# Patient Record
Sex: MALE | Race: WHITE | Employment: OTHER | URBAN - NONMETROPOLITAN AREA
[De-identification: names, ages, dates, MRNs, and addresses within clinical notes are randomized per-mention and may not be internally consistent; named-entity substitution may affect disease eponyms.]

---

## 2017-03-13 ENCOUNTER — TELEPHONE (OUTPATIENT)
Dept: FAMILY MEDICINE CLINIC | Age: 77
End: 2017-03-13

## 2017-03-20 ENCOUNTER — OFFICE VISIT (OUTPATIENT)
Dept: FAMILY MEDICINE CLINIC | Age: 77
End: 2017-03-20

## 2017-03-20 VITALS
WEIGHT: 198 LBS | HEART RATE: 80 BPM | DIASTOLIC BLOOD PRESSURE: 72 MMHG | HEIGHT: 73 IN | SYSTOLIC BLOOD PRESSURE: 126 MMHG | BODY MASS INDEX: 26.24 KG/M2

## 2017-03-20 DIAGNOSIS — I50.9 CONGESTIVE HEART FAILURE, UNSPECIFIED CONGESTIVE HEART FAILURE CHRONICITY, UNSPECIFIED CONGESTIVE HEART FAILURE TYPE: Primary | ICD-10-CM

## 2017-03-20 DIAGNOSIS — I25.10 ASHD (ARTERIOSCLEROTIC HEART DISEASE): ICD-10-CM

## 2017-03-20 DIAGNOSIS — K90.41 GLUTEN ENTEROPATHY: ICD-10-CM

## 2017-03-20 PROCEDURE — 99213 OFFICE O/P EST LOW 20 MIN: CPT | Performed by: FAMILY MEDICINE

## 2017-03-20 PROCEDURE — 4040F PNEUMOC VAC/ADMIN/RCVD: CPT | Performed by: FAMILY MEDICINE

## 2017-03-20 PROCEDURE — G8598 ASA/ANTIPLAT THER USED: HCPCS | Performed by: FAMILY MEDICINE

## 2017-03-20 PROCEDURE — 1123F ACP DISCUSS/DSCN MKR DOCD: CPT | Performed by: FAMILY MEDICINE

## 2017-03-20 PROCEDURE — G8427 DOCREV CUR MEDS BY ELIG CLIN: HCPCS | Performed by: FAMILY MEDICINE

## 2017-03-20 PROCEDURE — G8420 CALC BMI NORM PARAMETERS: HCPCS | Performed by: FAMILY MEDICINE

## 2017-03-20 PROCEDURE — 1036F TOBACCO NON-USER: CPT | Performed by: FAMILY MEDICINE

## 2017-03-20 PROCEDURE — G8484 FLU IMMUNIZE NO ADMIN: HCPCS | Performed by: FAMILY MEDICINE

## 2017-03-20 RX ORDER — ASPIRIN 81 MG/1
81 TABLET ORAL DAILY PRN
COMMUNITY
End: 2019-09-16

## 2017-03-20 RX ORDER — TERAZOSIN 5 MG/1
5 CAPSULE ORAL NIGHTLY
Qty: 90 CAPSULE | Refills: 1 | Status: SHIPPED | OUTPATIENT
Start: 2017-03-20 | End: 2017-09-18 | Stop reason: SDUPTHER

## 2017-03-20 RX ORDER — HYDROCHLOROTHIAZIDE 25 MG/1
25 TABLET ORAL DAILY
Qty: 90 TABLET | Refills: 1 | Status: SHIPPED | OUTPATIENT
Start: 2017-03-20 | End: 2017-09-18 | Stop reason: SDUPTHER

## 2017-03-20 RX ORDER — FLUTICASONE PROPIONATE 50 MCG
SPRAY, SUSPENSION (ML) NASAL
Qty: 3 BOTTLE | Refills: 1 | Status: SHIPPED | OUTPATIENT
Start: 2017-03-20 | End: 2017-09-18

## 2017-03-20 RX ORDER — BACLOFEN 10 MG/1
10 TABLET ORAL 2 TIMES DAILY
Qty: 180 TABLET | Refills: 1 | Status: SHIPPED | OUTPATIENT
Start: 2017-03-20 | End: 2017-09-18 | Stop reason: SDUPTHER

## 2017-03-20 RX ORDER — NIZATIDINE 150 MG/1
150 CAPSULE ORAL 2 TIMES DAILY
Qty: 180 CAPSULE | Refills: 1 | Status: SHIPPED | OUTPATIENT
Start: 2017-03-20 | End: 2017-09-18 | Stop reason: SDUPTHER

## 2017-04-26 ENCOUNTER — OFFICE VISIT (OUTPATIENT)
Dept: FAMILY MEDICINE CLINIC | Age: 77
End: 2017-04-26

## 2017-04-26 VITALS
WEIGHT: 194.8 LBS | SYSTOLIC BLOOD PRESSURE: 128 MMHG | DIASTOLIC BLOOD PRESSURE: 62 MMHG | HEART RATE: 76 BPM | BODY MASS INDEX: 25.82 KG/M2 | HEIGHT: 73 IN

## 2017-04-26 DIAGNOSIS — R52 PAIN: Primary | ICD-10-CM

## 2017-04-26 PROCEDURE — 1036F TOBACCO NON-USER: CPT | Performed by: FAMILY MEDICINE

## 2017-04-26 PROCEDURE — G8427 DOCREV CUR MEDS BY ELIG CLIN: HCPCS | Performed by: FAMILY MEDICINE

## 2017-04-26 PROCEDURE — 99212 OFFICE O/P EST SF 10 MIN: CPT | Performed by: FAMILY MEDICINE

## 2017-04-26 PROCEDURE — 4040F PNEUMOC VAC/ADMIN/RCVD: CPT | Performed by: FAMILY MEDICINE

## 2017-04-26 PROCEDURE — G8420 CALC BMI NORM PARAMETERS: HCPCS | Performed by: FAMILY MEDICINE

## 2017-04-26 PROCEDURE — G8598 ASA/ANTIPLAT THER USED: HCPCS | Performed by: FAMILY MEDICINE

## 2017-04-26 PROCEDURE — 1123F ACP DISCUSS/DSCN MKR DOCD: CPT | Performed by: FAMILY MEDICINE

## 2017-04-26 RX ORDER — TRAMADOL HYDROCHLORIDE 50 MG/1
50 TABLET ORAL 4 TIMES DAILY PRN
Qty: 20 TABLET | Refills: 0 | Status: SHIPPED | OUTPATIENT
Start: 2017-04-26 | End: 2017-06-05

## 2017-05-01 DIAGNOSIS — I25.10 ASHD (ARTERIOSCLEROTIC HEART DISEASE): ICD-10-CM

## 2017-05-01 RX ORDER — ROSUVASTATIN CALCIUM 10 MG/1
10 TABLET, COATED ORAL WEEKLY
Qty: 1 TABLET | Refills: 0 | Status: SHIPPED | OUTPATIENT
Start: 2017-05-01 | End: 2017-09-18 | Stop reason: CLARIF

## 2017-05-19 ENCOUNTER — OFFICE VISIT (OUTPATIENT)
Dept: FAMILY MEDICINE CLINIC | Age: 77
End: 2017-05-19

## 2017-05-19 VITALS
TEMPERATURE: 97.8 F | HEIGHT: 73 IN | SYSTOLIC BLOOD PRESSURE: 116 MMHG | BODY MASS INDEX: 25.39 KG/M2 | DIASTOLIC BLOOD PRESSURE: 78 MMHG | WEIGHT: 191.6 LBS | HEART RATE: 80 BPM

## 2017-05-19 DIAGNOSIS — J01.00 SUBACUTE MAXILLARY SINUSITIS: Primary | ICD-10-CM

## 2017-05-19 PROCEDURE — G8420 CALC BMI NORM PARAMETERS: HCPCS | Performed by: FAMILY MEDICINE

## 2017-05-19 PROCEDURE — 1123F ACP DISCUSS/DSCN MKR DOCD: CPT | Performed by: FAMILY MEDICINE

## 2017-05-19 PROCEDURE — 4040F PNEUMOC VAC/ADMIN/RCVD: CPT | Performed by: FAMILY MEDICINE

## 2017-05-19 PROCEDURE — 99212 OFFICE O/P EST SF 10 MIN: CPT | Performed by: FAMILY MEDICINE

## 2017-05-19 PROCEDURE — G8427 DOCREV CUR MEDS BY ELIG CLIN: HCPCS | Performed by: FAMILY MEDICINE

## 2017-05-19 PROCEDURE — 1036F TOBACCO NON-USER: CPT | Performed by: FAMILY MEDICINE

## 2017-05-19 PROCEDURE — G8598 ASA/ANTIPLAT THER USED: HCPCS | Performed by: FAMILY MEDICINE

## 2017-05-19 RX ORDER — DOXYCYCLINE HYCLATE 100 MG/1
100 CAPSULE ORAL 2 TIMES DAILY
Qty: 20 CAPSULE | Refills: 0 | Status: SHIPPED | OUTPATIENT
Start: 2017-05-19 | End: 2017-07-31 | Stop reason: SDUPTHER

## 2017-06-09 PROBLEM — I44.2 COMPLETE HEART BLOCK (HCC): Status: ACTIVE | Noted: 2017-06-09

## 2017-06-09 PROBLEM — Z45.010 PACEMAKER BATTERY DEPLETION: Status: ACTIVE | Noted: 2017-06-09

## 2017-06-26 DIAGNOSIS — I25.10 ASHD (ARTERIOSCLEROTIC HEART DISEASE): ICD-10-CM

## 2017-06-26 RX ORDER — ROSUVASTATIN CALCIUM 5 MG/1
5 TABLET, COATED ORAL NIGHTLY
Qty: 90 TABLET | Refills: 0 | Status: SHIPPED | OUTPATIENT
Start: 2017-06-26 | End: 2017-09-18 | Stop reason: SDUPTHER

## 2017-07-25 ENCOUNTER — OFFICE VISIT (OUTPATIENT)
Dept: FAMILY MEDICINE CLINIC | Age: 77
End: 2017-07-25
Payer: COMMERCIAL

## 2017-07-25 VITALS
SYSTOLIC BLOOD PRESSURE: 138 MMHG | DIASTOLIC BLOOD PRESSURE: 80 MMHG | HEART RATE: 60 BPM | HEIGHT: 73 IN | BODY MASS INDEX: 25.69 KG/M2 | WEIGHT: 193.8 LBS | TEMPERATURE: 98.3 F

## 2017-07-25 DIAGNOSIS — J02.8 ACUTE PHARYNGITIS DUE TO OTHER SPECIFIED ORGANISMS: Primary | ICD-10-CM

## 2017-07-25 PROCEDURE — 1036F TOBACCO NON-USER: CPT | Performed by: FAMILY MEDICINE

## 2017-07-25 PROCEDURE — G8598 ASA/ANTIPLAT THER USED: HCPCS | Performed by: FAMILY MEDICINE

## 2017-07-25 PROCEDURE — G8427 DOCREV CUR MEDS BY ELIG CLIN: HCPCS | Performed by: FAMILY MEDICINE

## 2017-07-25 PROCEDURE — 1123F ACP DISCUSS/DSCN MKR DOCD: CPT | Performed by: FAMILY MEDICINE

## 2017-07-25 PROCEDURE — G8419 CALC BMI OUT NRM PARAM NOF/U: HCPCS | Performed by: FAMILY MEDICINE

## 2017-07-25 PROCEDURE — 4040F PNEUMOC VAC/ADMIN/RCVD: CPT | Performed by: FAMILY MEDICINE

## 2017-07-25 PROCEDURE — 99213 OFFICE O/P EST LOW 20 MIN: CPT | Performed by: FAMILY MEDICINE

## 2017-07-25 RX ORDER — AMOXICILLIN 500 MG/1
1 TABLET, FILM COATED ORAL 2 TIMES DAILY
Qty: 20 TABLET | Refills: 0 | Status: SHIPPED | OUTPATIENT
Start: 2017-07-25 | End: 2017-07-31 | Stop reason: ALTCHOICE

## 2017-07-25 ASSESSMENT — ENCOUNTER SYMPTOMS
SORE THROAT: 1
COUGH: 0
NAUSEA: 0

## 2017-07-31 ENCOUNTER — OFFICE VISIT (OUTPATIENT)
Dept: FAMILY MEDICINE CLINIC | Age: 77
End: 2017-07-31
Payer: COMMERCIAL

## 2017-07-31 VITALS
TEMPERATURE: 98.2 F | DIASTOLIC BLOOD PRESSURE: 70 MMHG | BODY MASS INDEX: 25.73 KG/M2 | HEART RATE: 74 BPM | SYSTOLIC BLOOD PRESSURE: 128 MMHG | WEIGHT: 195 LBS

## 2017-07-31 DIAGNOSIS — J01.10 ACUTE NON-RECURRENT FRONTAL SINUSITIS: Primary | ICD-10-CM

## 2017-07-31 PROCEDURE — 1123F ACP DISCUSS/DSCN MKR DOCD: CPT | Performed by: FAMILY MEDICINE

## 2017-07-31 PROCEDURE — G8419 CALC BMI OUT NRM PARAM NOF/U: HCPCS | Performed by: FAMILY MEDICINE

## 2017-07-31 PROCEDURE — G8427 DOCREV CUR MEDS BY ELIG CLIN: HCPCS | Performed by: FAMILY MEDICINE

## 2017-07-31 PROCEDURE — 1036F TOBACCO NON-USER: CPT | Performed by: FAMILY MEDICINE

## 2017-07-31 PROCEDURE — 99213 OFFICE O/P EST LOW 20 MIN: CPT | Performed by: FAMILY MEDICINE

## 2017-07-31 PROCEDURE — G8598 ASA/ANTIPLAT THER USED: HCPCS | Performed by: FAMILY MEDICINE

## 2017-07-31 PROCEDURE — 4040F PNEUMOC VAC/ADMIN/RCVD: CPT | Performed by: FAMILY MEDICINE

## 2017-07-31 RX ORDER — DOXYCYCLINE HYCLATE 100 MG/1
100 CAPSULE ORAL 2 TIMES DAILY
Qty: 20 CAPSULE | Refills: 0 | Status: SHIPPED | OUTPATIENT
Start: 2017-07-31 | End: 2017-08-10

## 2017-07-31 ASSESSMENT — ENCOUNTER SYMPTOMS
SINUS PRESSURE: 1
HOARSE VOICE: 0
COUGH: 0
SORE THROAT: 0
SHORTNESS OF BREATH: 0

## 2017-08-24 ENCOUNTER — OFFICE VISIT (OUTPATIENT)
Dept: FAMILY MEDICINE CLINIC | Age: 77
End: 2017-08-24
Payer: COMMERCIAL

## 2017-08-24 VITALS
HEIGHT: 73 IN | BODY MASS INDEX: 25.58 KG/M2 | WEIGHT: 193 LBS | DIASTOLIC BLOOD PRESSURE: 80 MMHG | SYSTOLIC BLOOD PRESSURE: 132 MMHG | HEART RATE: 74 BPM

## 2017-08-24 DIAGNOSIS — M25.511 ACUTE PAIN OF BOTH SHOULDERS: Primary | ICD-10-CM

## 2017-08-24 DIAGNOSIS — M77.8 TENDONITIS OF SHOULDER, RIGHT: ICD-10-CM

## 2017-08-24 DIAGNOSIS — M25.512 ACUTE PAIN OF BOTH SHOULDERS: Primary | ICD-10-CM

## 2017-08-24 PROCEDURE — 1123F ACP DISCUSS/DSCN MKR DOCD: CPT | Performed by: FAMILY MEDICINE

## 2017-08-24 PROCEDURE — G8419 CALC BMI OUT NRM PARAM NOF/U: HCPCS | Performed by: FAMILY MEDICINE

## 2017-08-24 PROCEDURE — G8598 ASA/ANTIPLAT THER USED: HCPCS | Performed by: FAMILY MEDICINE

## 2017-08-24 PROCEDURE — 1036F TOBACCO NON-USER: CPT | Performed by: FAMILY MEDICINE

## 2017-08-24 PROCEDURE — G8427 DOCREV CUR MEDS BY ELIG CLIN: HCPCS | Performed by: FAMILY MEDICINE

## 2017-08-24 PROCEDURE — 4040F PNEUMOC VAC/ADMIN/RCVD: CPT | Performed by: FAMILY MEDICINE

## 2017-08-24 PROCEDURE — 99213 OFFICE O/P EST LOW 20 MIN: CPT | Performed by: FAMILY MEDICINE

## 2017-09-11 ENCOUNTER — TELEPHONE (OUTPATIENT)
Dept: FAMILY MEDICINE CLINIC | Age: 77
End: 2017-09-11

## 2017-09-11 DIAGNOSIS — I25.10 ASHD (ARTERIOSCLEROTIC HEART DISEASE): Primary | ICD-10-CM

## 2017-09-18 ENCOUNTER — OFFICE VISIT (OUTPATIENT)
Dept: FAMILY MEDICINE CLINIC | Age: 77
End: 2017-09-18
Payer: COMMERCIAL

## 2017-09-18 VITALS
HEIGHT: 73 IN | DIASTOLIC BLOOD PRESSURE: 82 MMHG | WEIGHT: 192.6 LBS | BODY MASS INDEX: 25.53 KG/M2 | SYSTOLIC BLOOD PRESSURE: 130 MMHG | HEART RATE: 62 BPM

## 2017-09-18 DIAGNOSIS — I10 ESSENTIAL HYPERTENSION: ICD-10-CM

## 2017-09-18 DIAGNOSIS — N40.0 BENIGN NON-NODULAR PROSTATIC HYPERPLASIA WITHOUT LOWER URINARY TRACT SYMPTOMS: ICD-10-CM

## 2017-09-18 DIAGNOSIS — I25.10 ASHD (ARTERIOSCLEROTIC HEART DISEASE): Primary | ICD-10-CM

## 2017-09-18 DIAGNOSIS — M62.838 MUSCLE SPASM: ICD-10-CM

## 2017-09-18 PROCEDURE — 4040F PNEUMOC VAC/ADMIN/RCVD: CPT | Performed by: FAMILY MEDICINE

## 2017-09-18 PROCEDURE — G8417 CALC BMI ABV UP PARAM F/U: HCPCS | Performed by: FAMILY MEDICINE

## 2017-09-18 PROCEDURE — 1123F ACP DISCUSS/DSCN MKR DOCD: CPT | Performed by: FAMILY MEDICINE

## 2017-09-18 PROCEDURE — 1036F TOBACCO NON-USER: CPT | Performed by: FAMILY MEDICINE

## 2017-09-18 PROCEDURE — G8598 ASA/ANTIPLAT THER USED: HCPCS | Performed by: FAMILY MEDICINE

## 2017-09-18 PROCEDURE — G8427 DOCREV CUR MEDS BY ELIG CLIN: HCPCS | Performed by: FAMILY MEDICINE

## 2017-09-18 PROCEDURE — 99214 OFFICE O/P EST MOD 30 MIN: CPT | Performed by: FAMILY MEDICINE

## 2017-09-18 RX ORDER — TERAZOSIN 5 MG/1
5 CAPSULE ORAL NIGHTLY
Qty: 90 CAPSULE | Refills: 1 | Status: SHIPPED | OUTPATIENT
Start: 2017-09-18 | End: 2018-03-28 | Stop reason: SDUPTHER

## 2017-09-18 RX ORDER — NIZATIDINE 150 MG/1
150 CAPSULE ORAL 2 TIMES DAILY
Qty: 180 CAPSULE | Refills: 1 | Status: SHIPPED | OUTPATIENT
Start: 2017-09-18 | End: 2018-03-28 | Stop reason: SDUPTHER

## 2017-09-18 RX ORDER — HYDROCHLOROTHIAZIDE 25 MG/1
25 TABLET ORAL DAILY
Qty: 90 TABLET | Refills: 1 | Status: SHIPPED | OUTPATIENT
Start: 2017-09-18 | End: 2018-03-28 | Stop reason: SDUPTHER

## 2017-09-18 RX ORDER — ROSUVASTATIN CALCIUM 5 MG/1
5 TABLET, COATED ORAL NIGHTLY
Qty: 90 TABLET | Refills: 1 | Status: SHIPPED | OUTPATIENT
Start: 2017-09-18 | End: 2018-03-28 | Stop reason: SDUPTHER

## 2017-09-18 RX ORDER — BACLOFEN 10 MG/1
10 TABLET ORAL 2 TIMES DAILY PRN
Qty: 180 TABLET | Refills: 0 | Status: SHIPPED | OUTPATIENT
Start: 2017-09-18 | End: 2018-03-28 | Stop reason: SDUPTHER

## 2017-09-18 ASSESSMENT — ENCOUNTER SYMPTOMS
WHEEZING: 0
SHORTNESS OF BREATH: 0

## 2017-09-19 ENCOUNTER — TELEPHONE (OUTPATIENT)
Dept: FAMILY MEDICINE CLINIC | Age: 77
End: 2017-09-19

## 2017-09-19 ENCOUNTER — NURSE ONLY (OUTPATIENT)
Dept: FAMILY MEDICINE CLINIC | Age: 77
End: 2017-09-19
Payer: COMMERCIAL

## 2017-09-19 DIAGNOSIS — I25.10 ASHD (ARTERIOSCLEROTIC HEART DISEASE): ICD-10-CM

## 2017-09-19 LAB
CHOLESTEROL, TOTAL: 134 MG/DL (ref 100–199)
HDLC SERPL-MCNC: 53 MG/DL
LDL CHOLESTEROL CALCULATED: 63 MG/DL
TRIGL SERPL-MCNC: 88 MG/DL (ref 0–199)

## 2017-09-19 PROCEDURE — 36415 COLL VENOUS BLD VENIPUNCTURE: CPT | Performed by: FAMILY MEDICINE

## 2018-01-29 ENCOUNTER — OFFICE VISIT (OUTPATIENT)
Dept: FAMILY MEDICINE CLINIC | Age: 78
End: 2018-01-29
Payer: MEDICARE

## 2018-01-29 VITALS
BODY MASS INDEX: 26.06 KG/M2 | SYSTOLIC BLOOD PRESSURE: 132 MMHG | HEIGHT: 73 IN | HEART RATE: 70 BPM | DIASTOLIC BLOOD PRESSURE: 80 MMHG | WEIGHT: 196.6 LBS

## 2018-01-29 DIAGNOSIS — M51.36 LUMBAR DEGENERATIVE DISC DISEASE: Primary | ICD-10-CM

## 2018-01-29 DIAGNOSIS — G89.29 CHRONIC RIGHT-SIDED LOW BACK PAIN WITHOUT SCIATICA: ICD-10-CM

## 2018-01-29 DIAGNOSIS — M54.50 CHRONIC RIGHT-SIDED LOW BACK PAIN WITHOUT SCIATICA: ICD-10-CM

## 2018-01-29 DIAGNOSIS — I25.10 ASHD (ARTERIOSCLEROTIC HEART DISEASE): ICD-10-CM

## 2018-01-29 PROCEDURE — 99213 OFFICE O/P EST LOW 20 MIN: CPT | Performed by: FAMILY MEDICINE

## 2018-01-29 ASSESSMENT — PATIENT HEALTH QUESTIONNAIRE - PHQ9
1. LITTLE INTEREST OR PLEASURE IN DOING THINGS: 0
SUM OF ALL RESPONSES TO PHQ QUESTIONS 1-9: 0
2. FEELING DOWN, DEPRESSED OR HOPELESS: 0
SUM OF ALL RESPONSES TO PHQ9 QUESTIONS 1 & 2: 0

## 2018-01-29 NOTE — PATIENT INSTRUCTIONS
Patient Education        Low Back Pain: Exercises  Your Care Instructions  Here are some examples of typical rehabilitation exercises for your condition. Start each exercise slowly. Ease off the exercise if you start to have pain. Your doctor or physical therapist will tell you when you can start these exercises and which ones will work best for you. How to do the exercises  Press-up    1. Lie on your stomach, supporting your body with your forearms. 2. Press your elbows down into the floor to raise your upper back. As you do this, relax your stomach muscles and allow your back to arch without using your back muscles. As your press up, do not let your hips or pelvis come off the floor. 3. Hold for 15 to 30 seconds, then relax. 4. Repeat 2 to 4 times. Alternate arm and leg (bird dog) exercise    Do this exercise slowly. Try to keep your body straight at all times, and do not let one hip drop lower than the other. 1. Start on the floor, on your hands and knees. 2. Tighten your belly muscles. 3. Raise one leg off the floor, and hold it straight out behind you. Be careful not to let your hip drop down, because that will twist your trunk. 4. Hold for about 6 seconds, then lower your leg and switch to the other leg. 5. Repeat 8 to 12 times on each leg. 6. Over time, work up to holding for 10 to 30 seconds each time. 7. If you feel stable and secure with your leg raised, try raising the opposite arm straight out in front of you at the same time. Knee-to-chest exercise    1. Lie on your back with your knees bent and your feet flat on the floor. 2. Bring one knee to your chest, keeping the other foot flat on the floor (or keeping the other leg straight, whichever feels better on your lower back). 3. Keep your lower back pressed to the floor. Hold for at least 15 to 30 seconds. 4. Relax, and lower the knee to the starting position. 5. Repeat with the other leg. Repeat 2 to 4 times with each leg.   6. To get touching the floor and the other heel touching the wall. 4. Repeat with your other leg. 5. Do 2 to 4 times for each leg. Hip flexor stretch    1. Kneel on the floor with one knee bent and one leg behind you. Place your forward knee over your foot. Keep your other knee touching the floor. 2. Slowly push your hips forward until you feel a stretch in the upper thigh of your rear leg. 3. Hold the stretch for at least 15 to 30 seconds. Repeat with your other leg. 4. Do 2 to 4 times on each side. Wall sit    1. Stand with your back 10 to 12 inches away from a wall. 2. Lean into the wall until your back is flat against it. 3. Slowly slide down until your knees are slightly bent, pressing your lower back into the wall. 4. Hold for about 6 seconds, then slide back up the wall. 5. Repeat 8 to 12 times. Follow-up care is a key part of your treatment and safety. Be sure to make and go to all appointments, and call your doctor if you are having problems. It's also a good idea to know your test results and keep a list of the medicines you take. Where can you learn more? Go to https://[employer]. org and sign in to your [employer] account. Enter C676 in the [employer]Â® box to learn more about \"Low Back Pain: Exercises. \"     If you do not have an account, please click on the \"Sign Up Now\" link. Current as of: March 21, 2017  Content Version: 11.5  © 9971-4315 Healthwise, Incorporated. Care instructions adapted under license by [facility] ([facility]). If you have questions about a medical condition or this instruction, always ask your healthcare professional. [place] [address] any warranty or liability for your use of this information.

## 2018-02-05 ENCOUNTER — HOSPITAL ENCOUNTER (OUTPATIENT)
Dept: PHYSICAL THERAPY | Age: 78
Setting detail: THERAPIES SERIES
Discharge: HOME | End: 2018-02-05
Payer: MEDICARE

## 2018-02-05 PROCEDURE — 97110 THERAPEUTIC EXERCISES: CPT

## 2018-02-05 PROCEDURE — 97161 PT EVAL LOW COMPLEX 20 MIN: CPT

## 2018-02-05 PROCEDURE — G8978 MOBILITY CURRENT STATUS: HCPCS

## 2018-02-05 PROCEDURE — G8979 MOBILITY GOAL STATUS: HCPCS

## 2018-02-05 ASSESSMENT — PAIN DESCRIPTION - LOCATION: LOCATION: BACK

## 2018-02-05 ASSESSMENT — PAIN SCALES - GENERAL: PAINLEVEL_OUTOF10: 9

## 2018-02-05 NOTE — PROGRESS NOTES
I certify that I have examined the patient below and determined that Physical Medicine and Rehabilitation service is necessary; that the secondary diagnosis for the provision of rehabilitation services is consistent with identified needs; that service will be furnished on an outpatient basis while the patient is in my care; that I approve the above plan of care for up to 90 days or as specifically noted above and will review it within that time frame or more often if the patients condition requires. Attestation, signature or co-signature of physician indicates approval of certification requirements.    ________________________ ____________ __________  Physician Signature   Date   Time       [address]     Time In: 1430  Time Out: 1520  Minutes: 50  Timed Code Treatment Minutes: 25 Minutes     Date: 2018  Patient Name: Santiago De La Rosa,  Gender:  male        CSN: [id number]   : 1940  (65 y.o.)  Referral Date : 18     Referring Practitioner: Dr. Gio Beasley      Diagnosis: lumbar DDD, chronic right-sided low back pain without sciaitica  Treatment Diagnosis: LBP, difficulty walking  Additional Pertinent Hx: See Medical History Questionnaire. Reviewed meds and allergies. + Pacemaker 9 years ago. General:  PT Visit Information  Onset Date: 18  PT Insurance Information: Anthem Medicare- pays at 100% after $40.00 co-pay per visit. Ionto not covered. Eval only approved, all visits need precert with [employer]  Total # of Visits Approved: 1  Total # of Visits to Date: 1  Plan of Care/Certification Expiration Date: 18  Progress Note Due Date: 18  Progress Note Counter: 1/10                Position Activity Restriction  Other position/activity restrictions: Pacemaker x 9 years.        See Medical History Questionnaire for information related to allergies and structures and functional, activity limitation and/or participation restrictions. See restrictions and objective section above for details. Clinical Presentation: Low - Stable and Uncomplicated: LBP    Decision Making: Low Complexity due to LBP. Decision making was based on patient assessment and decision making process in determining plan of care and establishing reasonable expectations for measurable functional outcomes. Evaluation Complexity: Based on the findings of patient history, examination, clinical presentation, and decision making during this evaluation, the evaluation of Juanetta Bernheim  is of low complexity. Goals:  Patient goals : to get my back pain to lessen    Short term goals  Time Frame for Short term goals: deferred to LTG's    Long term goals  Time Frame for Long term goals : 5 weeks  Long term goal 1: increase AROM lumbar flexion to 75%, extension and lateral flexion to 50% normal to allow patient to get OOB in morning with decreased LBP to 2/10  Long term goal 2: increase R LE strength to 3+/5, abdominal strength to fair with 15 reps DLSP to allow patient to report able get in/out of car and bed with decreased LBP to 2/10  Long term goal 3: decreased LBP to 2/10 to allow patient to report able to sleep x 4 hours without awakening due to LBP  Long term goal 4: I with HEP as prescribed to allow patient to bend over to put socks and shoes on with decreased LBP to 2/10    PT G-Codes  Functional Assessment Tool Used: AM-PAC BASIC MOBILITY  Score: 31  Functional Limitation: Mobility: Walking and moving around  Mobility: Walking and Moving Around Current Status (): At least 60 percent but less than 80 percent impaired, limited or restricted  Mobility: Walking and Moving Around Goal Status (): At least 40 percent but less than 60 percent impaired, limited or restricted    [facility]  [facility] # [id number]

## 2018-02-15 ENCOUNTER — HOSPITAL ENCOUNTER (OUTPATIENT)
Dept: PHYSICAL THERAPY | Age: 78
Setting detail: THERAPIES SERIES
Discharge: HOME | End: 2018-02-15
Payer: MEDICARE

## 2018-02-15 PROCEDURE — 97110 THERAPEUTIC EXERCISES: CPT

## 2018-02-15 NOTE — PROGRESS NOTES
to pacemaker)  Current Treatment Recommendations: Strengthening, ROM, Home Exercise Program, Modalities, Pain Management    Goals:  Patient goals : to get my back pain to lessen    Short term goals  Time Frame for Short term goals: deferred to LTG's    Long term goals  Time Frame for Long term goals : 5 weeks  Long term goal 1: increase AROM lumbar flexion to 75%, extension and lateral flexion to 50% normal to allow patient to get OOB in morning with decreased LBP to 2/10  Long term goal 2: increase R LE strength to 3+/5, abdominal strength to fair with 15 reps DLSP to allow patient to report able get in/out of car and bed with decreased LBP to 2/10  Long term goal 3: decreased LBP to 2/10 to allow patient to report able to sleep x 4 hours without awakening due to LBP  Long term goal 4: I with HEP as prescribed to allow patient to bend over to put socks and shoes on with decreased LBP to 2/10         Roselyn Padilla   PTA

## 2018-02-20 ENCOUNTER — HOSPITAL ENCOUNTER (OUTPATIENT)
Dept: PHYSICAL THERAPY | Age: 78
Setting detail: THERAPIES SERIES
Discharge: HOME | End: 2018-02-20
Payer: MEDICARE

## 2018-02-20 PROCEDURE — 97110 THERAPEUTIC EXERCISES: CPT

## 2018-02-20 NOTE — PROGRESS NOTES
[address]     Time In: 1120  Time Out: 1150  Minutes: 30  Timed Code Treatment Minutes: 30 Minutes     Date: 2018  Patient Name: Yamilka Carrillo,  Gender:  male        CSN: [id number]   : 1940  (65 y.o.)  Referral Date : 18    Referring Practitioner: Dr. Barrett Garcia      Diagnosis: lumbar DDD, chronic right-sided low back pain without sciaitica  Treatment Diagnosis: LBP, difficulty walking   Additional Pertinent Hx: See Medical History Questionnaire. Reviewed meds and allergies. + Pacemaker 9 years ago. General:  PT Visit Information  Onset Date: 18  PT Insurance Information: Anthem Medicare- pays at 100% after $40.00 co-pay per visit. Ionto not covered. Eval plus 6 visit, 7 total, 18-3/22/18  Med[employer]  Total # of Visits Approved: 7  Total # of Visits to Date: 3  Plan of Care/Certification Expiration Date: 18  Progress Note Due Date: 18  Progress Note Counter: 2/10               Subjective:  Chart Reviewed: Yes  Patient assessed for rehabilitation services?: Yes  Response To Previous Treatment: Not applicable  Family / Caregiver Present: No  Comments: 1/10 for PN. Cert due .  f/u with Dr. Nelwyn Goltz 3/12/18.      Subjective: reports decreased pain at night time pain decreased to 6/10     Pain:  Patient Currently in Pain: Yes         Objective                                                                                                                          Exercises  Exercise 1: nustep seat 2 holes showing, UE 11, work 3  5 min  Exercise 2: abdominal bracing 15x 5 seconds  Exercise 3: abdominal bracing with quad set 10 x 5 seconds R then L  Exercise 4: abdominal bracing with SLR  x5 B,    bridges 5 x5 seconds-- limited but no c/os pain  Exercise 5: LTR B 5 x 5 sec  Exercise 6: abdominal bracing with marches x 10 x B  Exercise 7: abdominal bracing

## 2018-02-22 ENCOUNTER — HOSPITAL ENCOUNTER (OUTPATIENT)
Dept: PHYSICAL THERAPY | Age: 78
Setting detail: THERAPIES SERIES
Discharge: HOME | End: 2018-02-22
Payer: MEDICARE

## 2018-02-22 PROCEDURE — 97110 THERAPEUTIC EXERCISES: CPT

## 2018-02-22 NOTE — PROGRESS NOTES
x10  x 5 seconds  Exercise 9: total gym level 8 B squat with abdominal bracing 15 x         Activity Tolerance:  Activity Tolerance: Patient Tolerated treatment well    Assessment: Body structures, Functions, Activity limitations: Decreased ROM, Decreased strength  Assessment: added SLR without pain, progressing well with strengthening  Prognosis: Excellent  REQUIRES PT FOLLOW UP: Yes    Patient Education:  Patient Education: reviewed HEP, updated reps                      Plan:  Times per week: 2 x per week  Plan weeks: 5 weeks  Specific instructions for Next Treatment: DLSP , core strengthening, LE strengthening, progress to gentle stretching as pain lessens, progress to NuStep, massage prn for pain control (No IFC or US due to pacemaker)  Current Treatment Recommendations: Strengthening, ROM, Home Exercise Program, Modalities, Pain Management    Goals:  Patient goals : to get my back pain to lessen    Short term goals  Time Frame for Short term goals: deferred to LTG's    Long term goals  Time Frame for Long term goals : 5 weeks  Long term goal 1: increase AROM lumbar flexion to 75%, extension and lateral flexion to 50% normal to allow patient to get OOB in morning with decreased LBP to 2/10  Long term goal 2: increase R LE strength to 3+/5, abdominal strength to fair with 15 reps DLSP to allow patient to report able get in/out of car and bed with decreased LBP to 2/10  Long term goal 3: decreased LBP to 2/10 to allow patient to report able to sleep x 4 hours without awakening due to LBP  Long term goal 4: I with HEP as prescribed to allow patient to bend over to put socks and shoes on with decreased LBP to 2/10         Willie Ge  Theone Reagan # [id number]

## 2018-02-26 ENCOUNTER — HOSPITAL ENCOUNTER (OUTPATIENT)
Dept: PHYSICAL THERAPY | Age: 78
Setting detail: THERAPIES SERIES
Discharge: HOME | End: 2018-02-26
Payer: MEDICARE

## 2018-02-26 PROCEDURE — 97110 THERAPEUTIC EXERCISES: CPT

## 2018-02-26 ASSESSMENT — PAIN DESCRIPTION - LOCATION: LOCATION: BACK

## 2018-02-26 ASSESSMENT — PAIN SCALES - GENERAL: PAINLEVEL_OUTOF10: 3

## 2018-02-26 NOTE — PROGRESS NOTES
sec  Exercise 6: abdominal bracing with marches x 10 x B  Exercise 7: abdominal bracing with SAQ 5 x 5 seconds  Exercise 8: abdominal bracing with LAQ x10  x 5 seconds  Exercise 9: standing heel raises 10x  Exercise 10: total gym level 8 B squat with abdominal bracing 20 x         Activity Tolerance:  Activity Tolerance: Patient Tolerated treatment well    Assessment: Body structures, Functions, Activity limitations: Decreased ROM, Decreased strength  Assessment: added standing heel raises without increased pain  Prognosis: Excellent  REQUIRES PT FOLLOW UP: Yes    Patient Education:  Patient Education: reviewed HEP, updated reps                      Plan:  Times per week: 2 x per week  Plan weeks: 5 weeks  Specific instructions for Next Treatment: DLSP , core strengthening, LE strengthening, progress to gentle stretching as pain lessens, progress to NuStep, massage prn for pain control (No IFC or US due to pacemaker)  Current Treatment Recommendations: Strengthening, ROM, Home Exercise Program, Modalities, Pain Management    Goals:  Patient goals : to get my back pain to lessen    Short term goals  Time Frame for Short term goals: deferred to LTG's    Long term goals  Time Frame for Long term goals : 5 weeks  Long term goal 1: increase AROM lumbar flexion to 75%, extension and lateral flexion to 50% normal to allow patient to get OOB in morning with decreased LBP to 2/10  Long term goal 2: increase R LE strength to 3+/5, abdominal strength to fair with 15 reps DLSP to allow patient to report able get in/out of car and bed with decreased LBP to 2/10  Long term goal 3: decreased LBP to 2/10 to allow patient to report able to sleep x 4 hours without awakening due to LBP  Long term goal 4: I with HEP as prescribed to allow patient to bend over to put socks and shoes on with decreased LBP to 2/10         Richardean Adolfo.  Kamron Galeas # [id number]

## 2018-02-28 ENCOUNTER — HOSPITAL ENCOUNTER (OUTPATIENT)
Dept: PHYSICAL THERAPY | Age: 78
Setting detail: THERAPIES SERIES
Discharge: HOME | End: 2018-02-28
Payer: MEDICARE

## 2018-02-28 PROCEDURE — 97110 THERAPEUTIC EXERCISES: CPT

## 2018-02-28 ASSESSMENT — PAIN DESCRIPTION - LOCATION: LOCATION: BACK

## 2018-02-28 ASSESSMENT — PAIN SCALES - GENERAL: PAINLEVEL_OUTOF10: 2

## 2018-02-28 NOTE — PROGRESS NOTES
[address] [employer] PHYSICAL THERAPY  DAILY NOTE  [address] [facility].     Time In: 3006  Time Out: 3804  Minutes: 30  Timed Code Treatment Minutes: 30 Minutes     Date: 2018  Patient Name: Selma Urbina,  Gender:  male        CSN: [id number]   : 1940  (65 y.o.)  Referral Date : 18    Referring Practitioner: Dr. Bernadette Gonzalez      Diagnosis: lumbar DDD, chronic right-sided low back pain without sciaitica  Treatment Diagnosis: LBP, difficulty walking   Additional Pertinent Hx: See Medical History Questionnaire. Reviewed meds and allergies. + Pacemaker 9 years ago. General:  PT Visit Information  Onset Date: 18  PT Insurance Information: Anthem Medicare- pays at 100% after $40.00 co-pay per visit. Ionto not covered. Eval plus 6 visit, 7 total, 18-3/22/18  [employer]  Total # of Visits Approved: 7  Total # of Visits to Date: 5  Plan of Care/Certification Expiration Date: 18  Progress Note Counter: 5/10               Subjective:  Family / Caregiver Present: No  Comments: 4/10 for PN. Cert due .  f/u with Dr. You Hernandez 3/12/18. Subjective: Pt reporting having very little pain today at 1-2/10 \"if that\". Pt reporting he took a few pain pills this morning but has not needed to take anything since.       Pain:  Patient Currently in Pain: Yes  Pain Assessment: 0-10  Pain Level: 2  Pain Location: Back      Objective   Exercises  Exercise 1: NuStep seat 2 holes showing, UE 11, work 3  5 min  Exercise 2: abdominal bracing 15x 5 seconds  Exercise 3: abdominal bracing with quad set 20 x 5 seconds R then L  Exercise 4: abdominal bracing with SLR  x5 B,    bridges 5 x5 seconds-- limited but no c/os pain  Exercise 5: LTR B 10 x 5 sec  Exercise 6: abdominal bracing with marches x 15 x B  Exercise 7: abdominal bracing with SAQ 10 x 5 seconds  Exercise 8: abdominal bracing with LAQ x10  x 5 seconds  Exercise 9: standing heel raises 10x  Exercise 10: total gym level 8 B squat with abdominal bracing 20 x         Activity Tolerance:  Activity Tolerance: Patient Tolerated treatment well    Assessment:  Assessment: progressed reps of exercises with good tolerance. Pt denies increase in pain at end of session. Prognosis: Excellent       Patient Education:  Patient Education: Increase reps of HEP. Monitor response to progressions.                       Plan:  Times per week: 2 x per week  Plan weeks: 5 weeks  Specific instructions for Next Treatment: DLSP , core strengthening, LE strengthening, progress to gentle stretching as pain lessens, progress to NuStep, massage prn for pain control (No IFC or US due to pacemaker)  Current Treatment Recommendations: Strengthening, ROM, Home Exercise Program, Modalities, Pain Management    Goals:  Patient goals : to get my back pain to lessen    Short term goals  Time Frame for Short term goals: deferred to LTG's    Long term goals  Time Frame for Long term goals : 5 weeks  Long term goal 1: increase AROM lumbar flexion to 75%, extension and lateral flexion to 50% normal to allow patient to get OOB in morning with decreased LBP to 2/10  Long term goal 2: increase R LE strength to 3+/5, abdominal strength to fair with 15 reps DLSP to allow patient to report able get in/out of car and bed with decreased LBP to 2/10  Long term goal 3: decreased LBP to 2/10 to allow patient to report able to sleep x 4 hours without awakening due to LBP  Long term goal 4: I with HEP as prescribed to allow patient to bend over to put socks and shoes on with decreased LBP to 2/10         Santino Mercado, [id number]

## 2018-03-05 ENCOUNTER — HOSPITAL ENCOUNTER (OUTPATIENT)
Dept: PHYSICAL THERAPY | Age: 78
Setting detail: THERAPIES SERIES
Discharge: HOME | End: 2018-03-05
Payer: MEDICARE

## 2018-03-05 PROCEDURE — 97110 THERAPEUTIC EXERCISES: CPT

## 2018-03-05 NOTE — PROGRESS NOTES
[address]     Time In: 8325  Time Out: [address]  Minutes: 29  Timed Code Treatment Minutes: 29 Minutes     Date: 3/5/2018  Patient Name: Elisabet Patrick,  Gender:  male        CSN: [id number]   : 1940  (65 y.o.)  Referral Date : 18    Referring Practitioner: Dr. Marlena Vergara      Diagnosis: lumbar DDD, chronic right-sided low back pain without sciaitica  Treatment Diagnosis: LBP, difficulty walking   Additional Pertinent Hx: See Medical History Questionnaire. Reviewed meds and allergies. + Pacemaker 9 years ago. General:  PT Visit Information  Onset Date: 18  PT Insurance Information: Anthem Medicare- pays at 100% after $40.00 co-pay per visit. Ionto not covered. Eval plus 6 visit, 7 total, 18-3/22/18  Medicare [employer]  Total # of Visits Approved: 7  Total # of Visits to Date: 6  Plan of Care/Certification Expiration Date: 18  Progress Note Counter: 6/10 for PN. Subjective:  Response To Previous Treatment: Not applicable  Family / Caregiver Present: No  Comments: 4/10 for PN. Cert due .  f/u with Dr. Lore Whittington 3/12/18. Subjective: Pt denies having any pain currently and reporting that he has not been getting up in the night due to back pain. Has not taken anything for pain.       Pain:  Patient Currently in Pain: No         Objective   Exercises  Exercise 1: NuStep seat 2 holes showing, UE 11, work 3  5 min  Exercise 2: abdominal bracing 20 x 5 seconds  Exercise 3: abdominal bracing with quad set 20 x 5 seconds R then L  Exercise 4: abdominal bracing with SLR  x10 B,    bridges 5 x5 seconds-- limited but no c/os pain  Exercise 5: LTR B 10 x 5 sec  Exercise 6: abdominal bracing with marches x 15 x B  Exercise 7: abdominal bracing with SAQ 15 x 5 seconds  Exercise 8: abdominal bracing with LAQ x10  x 5 seconds  Exercise 9: standing heel raises 15x  Exercise 10: total gym level 8 B squat with abdominal bracing 25 x         Activity Tolerance:  Activity Tolerance: Patient Tolerated treatment well    Assessment:  Assessment: Continued to make progression with no complains. Pt reporting having no pain at end of session.    Prognosis: Excellent       Patient Education:  Patient Education: Continue to increase reps of HEP                       Plan:  Times per week: 2 x per week  Plan weeks: 5 weeks  Specific instructions for Next Treatment: DLSP , core strengthening, LE strengthening, progress to gentle stretching as pain lessens, progress to NuStep, massage prn for pain control (No IFC or US due to pacemaker)  Current Treatment Recommendations: Strengthening, ROM, Home Exercise Program, Modalities, Pain Management    Goals:  Patient goals : to get my back pain to lessen    Short term goals  Time Frame for Short term goals: deferred to LTG's    Long term goals  Time Frame for Long term goals : 5 weeks  Long term goal 1: increase AROM lumbar flexion to 75%, extension and lateral flexion to 50% normal to allow patient to get OOB in morning with decreased LBP to 2/10  Long term goal 2: increase R LE strength to 3+/5, abdominal strength to fair with 15 reps DLSP to allow patient to report able get in/out of car and bed with decreased LBP to 2/10  Long term goal 3: decreased LBP to 2/10 to allow patient to report able to sleep x 4 hours without awakening due to LBP  Long term goal 4: I with HEP as prescribed to allow patient to bend over to put socks and shoes on with decreased LBP to 2/10         [place] Oats, [id number]

## 2018-03-12 ENCOUNTER — OFFICE VISIT (OUTPATIENT)
Dept: FAMILY MEDICINE CLINIC | Age: 78
End: 2018-03-12
Payer: MEDICARE

## 2018-03-12 VITALS
WEIGHT: 196.8 LBS | HEIGHT: 73 IN | BODY MASS INDEX: 26.08 KG/M2 | HEART RATE: 62 BPM | SYSTOLIC BLOOD PRESSURE: 128 MMHG | DIASTOLIC BLOOD PRESSURE: 76 MMHG

## 2018-03-12 DIAGNOSIS — M51.36 LUMBAR DEGENERATIVE DISC DISEASE: ICD-10-CM

## 2018-03-12 DIAGNOSIS — M54.50 CHRONIC RIGHT-SIDED LOW BACK PAIN WITHOUT SCIATICA: Primary | ICD-10-CM

## 2018-03-12 DIAGNOSIS — G89.29 CHRONIC RIGHT-SIDED LOW BACK PAIN WITHOUT SCIATICA: Primary | ICD-10-CM

## 2018-03-12 DIAGNOSIS — I25.10 ASHD (ARTERIOSCLEROTIC HEART DISEASE): ICD-10-CM

## 2018-03-12 PROCEDURE — 99212 OFFICE O/P EST SF 10 MIN: CPT | Performed by: FAMILY MEDICINE

## 2018-03-12 RX ORDER — CHLORAL HYDRATE 500 MG
1200 CAPSULE ORAL 3 TIMES DAILY
COMMUNITY
End: 2020-04-02

## 2018-03-12 ASSESSMENT — ENCOUNTER SYMPTOMS: BACK PAIN: 1

## 2018-03-12 NOTE — PROGRESS NOTES
daily 90 tablet 1    nizatidine (AXID) 150 MG capsule Take 1 capsule by mouth 2 times daily 180 capsule 1    terazosin (HYTRIN) 5 MG capsule Take 1 capsule by mouth nightly 90 capsule 1    baclofen (LIORESAL) 10 MG tablet Take 1 tablet by mouth 2 times daily as needed (muscle spasm) 180 tablet 0    aspirin EC 81 MG EC tablet Take 81 mg by mouth daily as needed       mesalamine (ASACOL HD) 800 MG TBEC TBEC tablet Take 800 mg by mouth daily       captopril (CAPOTEN) 25 MG tablet Take 25 mg by mouth 2 times daily      carvedilol (COREG) 25 MG tablet Take 25 mg by mouth 2 times daily (with meals)      clopidogrel (PLAVIX) 75 MG tablet Take 1 tablet by mouth daily. (Patient taking differently: Take 75 mg by mouth nightly ) 90 tablet 1    digoxin (LANOXIN) 125 MCG tablet Take 1 tablet by mouth daily. 90 tablet 1    gemfibrozil (LOPID) 600 MG tablet Take 1 tablet by mouth 2 times daily (before meals). 180 tablet 1    Coenzyme Q10 (COQ10) 200 MG CAPS Take 200 mg/day by mouth 2 times daily  180 capsule 1    Saw Palmetto, Serenoa repens, 450 MG CAPS Take 425 mg/day by mouth daily. 90 capsule 1     No current facility-administered medications for this visit.       Allergies   Allergen Reactions    Latex Rash    Gluten Meal     Vasotec [Enalaprilat] Other (See Comments)     cough    Crestor [Rosuvastatin Calcium] Other (See Comments)     Myalgias at high dose    Neosporin [Neomycin-Polymyxin-Gramicidin] Rash    Sulfa Antibiotics Rash     Health Maintenance   Topic Date Due    Shingles Vaccine (1 of 2 - 2 Dose Series) 10/06/1989    Potassium monitoring  06/09/2018    Creatinine monitoring  06/09/2018    Lipid screen  09/19/2022    DTaP/Tdap/Td vaccine (2 - Td) 06/09/2027    Flu vaccine  Completed    Pneumococcal low/med risk  Completed       Objective:  /76 (Site: Left Arm, Position: Sitting, Cuff Size: Large Adult)   Pulse 62   Ht 6' 1\" (1.854 m)   Wt 196 lb 12.8 oz (89.3 kg)   BMI 25.96 kg/m² Physical Exam   Constitutional: He is oriented to person, place, and time. He appears well-developed and well-nourished. Neurological: He is alert and oriented to person, place, and time. Psychiatric: He has a normal mood and affect. His behavior is normal.   Vitals reviewed. Low back exam:  No ecchymosis or swelling.  minimal tenderness to palpation of right lumbar paraspinal muscles. Trunk AROM is decreased for flexion, extension, lateral flexion bilaterally. Negative straight leg raise. Strength:  Right Lower Extremity  Left Lower Extremity  Hip flexion:   5/5    5/5  Knee extension:  5/5    5/5  Ankle dorsiflexion:  5/5 5/5  Great toe extension:  5/5 5/5  Ankle plantarflexion:  5/5 5/5  Knee flexion:   5/5 5/5    Impression/Plan:  1. Chronic right-sided low back pain without sciatica  Chronic right-sided low back pain likely secondary to degenerative disc disease and possible spinal stenosis. Symptoms are much improved with physical therapy. Continue home exercise program.  Tylenol as needed. follow-up as needed    2. Lumbar degenerative disc disease    3. ASHD (arteriosclerotic heart disease)  - Lipid Panel; Future      They voiced understanding. All questions answered. They agreed with treatment plan. Reviewed health maintenance. Return if symptoms worsen or fail to improve.   6 month follow-up visit in a couple weeks      Electronically signed by Verona Melgar MD on 3/12/2018 at 10:11 AM

## 2018-03-13 ENCOUNTER — HOSPITAL ENCOUNTER (OUTPATIENT)
Dept: PHYSICAL THERAPY | Age: 78
Setting detail: THERAPIES SERIES
Discharge: HOME | End: 2018-03-13
Payer: MEDICARE

## 2018-03-13 PROCEDURE — 97110 THERAPEUTIC EXERCISES: CPT

## 2018-03-13 PROCEDURE — G8980 MOBILITY D/C STATUS: HCPCS

## 2018-03-13 PROCEDURE — G8979 MOBILITY GOAL STATUS: HCPCS

## 2018-03-13 NOTE — PROGRESS NOTES
5 sec  Exercise 6: abdominal bracing with marches x 15 x B  Exercise 7: abdominal bracing with SAQ 15 x 5 seconds  Exercise 8: abdominal bracing with LAQ x10  x 5 seconds  Exercise 9: standing heel raises 15x  Exercise 10: standing hip abduction 15 x R and L, mini-squat 15 x  Exercise 11: total gym level 8 B squat with abdominal bracing 25 x         Activity Tolerance:  Activity Tolerance: Patient limited by endurance    Assessment: Body structures, Functions, Activity limitations: Decreased ROM, Decreased strength  Assessment: Will discharge to thorough HEP for abdominal strengthening and LE strengthening supine, seated and standing. All goals met. Prognosis: Excellent  REQUIRES PT FOLLOW UP: No    Patient Education:  Patient Education: updated HEP  Barriers to Learning: none                      Plan:  Times per week: 2 x per week  Plan weeks: 5 weeks  Specific instructions for Next Treatment: DLSP , core strengthening, LE strengthening, progress to gentle stretching as pain lessens, progress to NuStep, massage prn for pain control (No IFC or US due to pacemaker)  Current Treatment Recommendations: Strengthening, ROM, Home Exercise Program, Modalities, Pain Management  Plan Comment: discharge to HEP    Goals:  Patient goals : to get my back pain to lessen    Short term goals  Time Frame for Short term goals: deferred to LTG's    Long term goals  Time Frame for Long term goals : 5 weeks  Long term goal 1: increase AROM lumbar flexion to 75%, extension and lateral flexion to 50% normal to allow patient to get OOB in morning with decreased LBP to 2/10. MET- LUMBAR FLEXION 75%, EXTENSION 50% OF NORMAL, NO PAIN WITH OOB IN MORNING  Long term goal 2: increase R LE strength to 3+/5, abdominal strength to fair with 15 reps DLSP to allow patient to report able get in/out of car and bed with decreased LBP to 2/10.   MET ABDOMINAL STRENGTH FAIR, LE STRENGTH 3+/5 HIP, KNEE 4-/5  Long term goal 3: decreased LBP to 2/10 to

## 2018-03-23 ENCOUNTER — NURSE ONLY (OUTPATIENT)
Dept: FAMILY MEDICINE CLINIC | Age: 78
End: 2018-03-23
Payer: MEDICARE

## 2018-03-23 DIAGNOSIS — I25.10 ASHD (ARTERIOSCLEROTIC HEART DISEASE): ICD-10-CM

## 2018-03-23 LAB
ALBUMIN SERPL-MCNC: 4.9 G/DL (ref 3.5–5.1)
ALP BLD-CCNC: 66 U/L (ref 38–126)
ALT SERPL-CCNC: 7 U/L (ref 11–66)
ANION GAP SERPL CALCULATED.3IONS-SCNC: 16 MEQ/L (ref 8–16)
AST SERPL-CCNC: 17 U/L (ref 5–40)
BILIRUB SERPL-MCNC: 0.7 MG/DL (ref 0.3–1.2)
BUN BLDV-MCNC: 17 MG/DL (ref 7–22)
CALCIUM SERPL-MCNC: 10.3 MG/DL (ref 8.5–10.5)
CHLORIDE BLD-SCNC: 93 MEQ/L (ref 98–111)
CHOLESTEROL, TOTAL: 133 MG/DL (ref 100–199)
CO2: 26 MEQ/L (ref 23–33)
CREAT SERPL-MCNC: 0.6 MG/DL (ref 0.4–1.2)
GFR SERPL CREATININE-BSD FRML MDRD: > 90 ML/MIN/1.73M2
GLUCOSE BLD-MCNC: 103 MG/DL (ref 70–108)
HCT VFR BLD CALC: 43.6 % (ref 42–52)
HDLC SERPL-MCNC: 55 MG/DL
HEMOGLOBIN: 15.4 GM/DL (ref 14–18)
LDL CHOLESTEROL CALCULATED: 62 MG/DL
MCH RBC QN AUTO: 31.9 PG (ref 27–31)
MCHC RBC AUTO-ENTMCNC: 35.2 GM/DL (ref 33–37)
MCV RBC AUTO: 90.7 FL (ref 80–94)
PDW BLD-RTO: 14 % (ref 11.5–14.5)
PLATELET # BLD: 293 THOU/MM3 (ref 130–400)
PMV BLD AUTO: 8.6 FL (ref 7.4–10.4)
POTASSIUM SERPL-SCNC: 4 MEQ/L (ref 3.5–5.2)
RBC # BLD: 4.81 MILL/MM3 (ref 4.7–6.1)
SODIUM BLD-SCNC: 135 MEQ/L (ref 135–145)
TOTAL PROTEIN: 8.3 G/DL (ref 6.1–8)
TRIGL SERPL-MCNC: 79 MG/DL (ref 0–199)
WBC # BLD: 7 THOU/MM3 (ref 4.8–10.8)

## 2018-03-23 PROCEDURE — 36415 COLL VENOUS BLD VENIPUNCTURE: CPT | Performed by: FAMILY MEDICINE

## 2018-03-23 NOTE — PROGRESS NOTES
Venipuncture obtained from right wrist.  Patient tolerated the procedure without complications or complaints.

## 2018-03-28 ENCOUNTER — OFFICE VISIT (OUTPATIENT)
Dept: FAMILY MEDICINE CLINIC | Age: 78
End: 2018-03-28
Payer: MEDICARE

## 2018-03-28 VITALS
HEART RATE: 58 BPM | HEIGHT: 73 IN | BODY MASS INDEX: 26 KG/M2 | WEIGHT: 196.2 LBS | SYSTOLIC BLOOD PRESSURE: 128 MMHG | DIASTOLIC BLOOD PRESSURE: 72 MMHG

## 2018-03-28 DIAGNOSIS — I25.10 ASHD (ARTERIOSCLEROTIC HEART DISEASE): Primary | ICD-10-CM

## 2018-03-28 DIAGNOSIS — K29.30 CHRONIC SUPERFICIAL GASTRITIS WITHOUT BLEEDING: ICD-10-CM

## 2018-03-28 DIAGNOSIS — M62.838 MUSCLE SPASM: ICD-10-CM

## 2018-03-28 DIAGNOSIS — N40.0 BENIGN NON-NODULAR PROSTATIC HYPERPLASIA WITHOUT LOWER URINARY TRACT SYMPTOMS: ICD-10-CM

## 2018-03-28 DIAGNOSIS — I10 ESSENTIAL HYPERTENSION: ICD-10-CM

## 2018-03-28 PROCEDURE — 99214 OFFICE O/P EST MOD 30 MIN: CPT | Performed by: FAMILY MEDICINE

## 2018-03-28 RX ORDER — ROSUVASTATIN CALCIUM 5 MG/1
5 TABLET, COATED ORAL NIGHTLY
Qty: 90 TABLET | Refills: 1 | Status: SHIPPED | OUTPATIENT
Start: 2018-03-28 | End: 2018-09-18 | Stop reason: SDUPTHER

## 2018-03-28 RX ORDER — NIZATIDINE 150 MG/1
150 CAPSULE ORAL 2 TIMES DAILY
Qty: 180 CAPSULE | Refills: 1 | Status: SHIPPED | OUTPATIENT
Start: 2018-03-28 | End: 2018-09-18 | Stop reason: SDUPTHER

## 2018-03-28 RX ORDER — TERAZOSIN 5 MG/1
5 CAPSULE ORAL NIGHTLY
Qty: 90 CAPSULE | Refills: 1 | Status: SHIPPED | OUTPATIENT
Start: 2018-03-28 | End: 2018-09-18 | Stop reason: SDUPTHER

## 2018-03-28 RX ORDER — HYDROCHLOROTHIAZIDE 25 MG/1
25 TABLET ORAL DAILY
Qty: 90 TABLET | Refills: 1 | Status: SHIPPED | OUTPATIENT
Start: 2018-03-28 | End: 2018-09-18 | Stop reason: SDUPTHER

## 2018-03-28 RX ORDER — BACLOFEN 10 MG/1
10 TABLET ORAL 2 TIMES DAILY PRN
Qty: 180 TABLET | Refills: 0 | Status: SHIPPED | OUTPATIENT
Start: 2018-03-28 | End: 2018-08-22 | Stop reason: SDUPTHER

## 2018-03-28 ASSESSMENT — ENCOUNTER SYMPTOMS
NAUSEA: 0
ANAL BLEEDING: 0
WHEEZING: 0
SHORTNESS OF BREATH: 0

## 2018-03-28 NOTE — PROGRESS NOTES
 Omega-3 Fatty Acids (FISH OIL) 1000 MG CAPS Take 1,200 mg by mouth 3 times daily      rosuvastatin (CRESTOR) 5 MG tablet Take 1 tablet by mouth nightly 90 tablet 1    hydrochlorothiazide (HYDRODIURIL) 25 MG tablet Take 1 tablet by mouth daily 90 tablet 1    nizatidine (AXID) 150 MG capsule Take 1 capsule by mouth 2 times daily 180 capsule 1    terazosin (HYTRIN) 5 MG capsule Take 1 capsule by mouth nightly 90 capsule 1    baclofen (LIORESAL) 10 MG tablet Take 1 tablet by mouth 2 times daily as needed (muscle spasm) 180 tablet 0    aspirin EC 81 MG EC tablet Take 81 mg by mouth daily as needed       mesalamine (ASACOL HD) 800 MG TBEC TBEC tablet Take 800 mg by mouth daily       captopril (CAPOTEN) 25 MG tablet Take 25 mg by mouth 2 times daily      carvedilol (COREG) 25 MG tablet Take 25 mg by mouth 2 times daily (with meals)      clopidogrel (PLAVIX) 75 MG tablet Take 1 tablet by mouth daily. (Patient taking differently: Take 75 mg by mouth nightly ) 90 tablet 1    digoxin (LANOXIN) 125 MCG tablet Take 1 tablet by mouth daily. 90 tablet 1    gemfibrozil (LOPID) 600 MG tablet Take 1 tablet by mouth 2 times daily (before meals). 180 tablet 1    Coenzyme Q10 (COQ10) 200 MG CAPS Take 200 mg/day by mouth 2 times daily  180 capsule 1    Saw Palmetto, Serenoa repens, 450 MG CAPS Take 425 mg/day by mouth daily. 90 capsule 1     No current facility-administered medications for this visit.       Allergies   Allergen Reactions    Latex Rash    Gluten Meal     Vasotec [Enalaprilat] Other (See Comments)     cough    Crestor [Rosuvastatin Calcium] Other (See Comments)     Myalgias at high dose    Neosporin [Neomycin-Polymyxin-Gramicidin] Rash    Sulfa Antibiotics Rash     Health Maintenance   Topic Date Due    Shingles Vaccine (1 of 2 - 2 Dose Series) 10/06/1989    Potassium monitoring  03/23/2019    Creatinine monitoring  03/23/2019    Lipid screen  03/23/2023    DTaP/Tdap/Td vaccine (2 - Td) Continue Axid    They voiced understanding. All questions answered. They agreed with treatment plan. Educational materials given - see patient instructions. Discussed use, benefit, and side effects of prescribed medications. Reviewed health maintenance. Return in about 6 months (around 9/28/2018) for HTN, cholesterol, etc.    Carmelo Mack received counseling on the following healthy behaviors: nutrition and exercise  Reviewed prior labs and health maintenance  Continue current medications, diet and exercise. Discussed use, benefit, and side effects of prescribed medications. Barriers to medication compliance addressed. Patient given educational materials - see patient instructions  Was a self-tracking handout given in paper form or via [employer]? No:     Requested Prescriptions     Signed Prescriptions Disp Refills    hydrochlorothiazide (HYDRODIURIL) 25 MG tablet 90 tablet 1     Sig: Take 1 tablet by mouth daily    nizatidine (AXID) 150 MG capsule 180 capsule 1     Sig: Take 1 capsule by mouth 2 times daily    terazosin (HYTRIN) 5 MG capsule 90 capsule 1     Sig: Take 1 capsule by mouth nightly    baclofen (LIORESAL) 10 MG tablet 180 tablet 0     Sig: Take 1 tablet by mouth 2 times daily as needed (muscle spasm)    rosuvastatin (CRESTOR) 5 MG tablet 90 tablet 1     Sig: Take 1 tablet by mouth nightly       All patient questions answered. Patient voiced understanding. Quality Measures    Body mass index is 25.89 kg/m². Normal. Weight control planned discussed Healthy diet and regular exercise. BP: 128/72. Blood pressure is normal. Treatment plan consists of No treatment change needed. Fall Risk 1/29/2018 9/19/2016 7/24/2015   2 or more falls in past year? no no no   Fall with injury in past year? no no no     The patient does not have a history of falls. I did not - not indicated , complete a risk assessment for falls.  A plan of care for falls No Treatment plan indicated    Lab Results Component Value Date    LDLCALC 62 03/23/2018    (goal LDL reduction with dx if diabetes is 50% LDL reduction)    PHQ Scores 1/29/2018 9/19/2016 7/24/2015   PHQ2 Score 0 0 0   PHQ9 Score 0 0 0     Interpretation of Total Score Depression Severity: 1-4 = Minimal depression, 5-9 = Mild depression, 10-14 = Moderate depression, 15-19 = Moderately severe depression, 20-27 = Severe depression        Electronically signed by Chriss Esparza MD on 3/28/2018 at 10:41 AM

## 2018-04-17 ENCOUNTER — HOSPITAL ENCOUNTER (EMERGENCY)
Age: 78
Discharge: HOME | End: 2018-04-17
Attending: EMERGENCY MEDICINE
Payer: MEDICARE

## 2018-04-17 ENCOUNTER — APPOINTMENT (OUTPATIENT)
Dept: CT IMAGING | Age: 78
End: 2018-04-17
Payer: MEDICARE

## 2018-04-17 VITALS
OXYGEN SATURATION: 98 % | SYSTOLIC BLOOD PRESSURE: 164 MMHG | TEMPERATURE: 97.5 F | BODY MASS INDEX: 25.73 KG/M2 | RESPIRATION RATE: 20 BRPM | HEIGHT: 72 IN | DIASTOLIC BLOOD PRESSURE: 77 MMHG | HEART RATE: 64 BPM | WEIGHT: 190 LBS

## 2018-04-17 DIAGNOSIS — N40.0 PROSTATIC HYPERTROPHY: ICD-10-CM

## 2018-04-17 DIAGNOSIS — N02.9 IDIOPATHIC HEMATURIA, UNSPECIFIED WHETHER GLOMERULAR MORPHOLOGIC CHANGES PRESENT: Primary | ICD-10-CM

## 2018-04-17 LAB
ALBUMIN SERPL-MCNC: 4.1 G/DL (ref 3.5–5.1)
ALP BLD-CCNC: 59 U/L (ref 38–126)
ALT SERPL-CCNC: 6 U/L (ref 11–66)
AMPHETAMINE+METHAMPHETAMINE URINE SCREEN: NEGATIVE
ANION GAP SERPL CALCULATED.3IONS-SCNC: 13 MEQ/L (ref 8–16)
APTT: 34.6 SECONDS (ref 22–38)
AST SERPL-CCNC: 17 U/L (ref 5–40)
BACTERIA: ABNORMAL /HPF
BARBITURATE QUANTITATIVE URINE: NEGATIVE
BASOPHILS # BLD: 1.1 %
BASOPHILS ABSOLUTE: 0.1 THOU/MM3 (ref 0–0.1)
BENZODIAZEPINE QUANTITATIVE URINE: NEGATIVE
BILIRUB SERPL-MCNC: 0.5 MG/DL (ref 0.3–1.2)
BILIRUBIN DIRECT: < 0.2 MG/DL (ref 0–0.3)
BILIRUBIN URINE: NEGATIVE
BLOOD, URINE: ABNORMAL
BUN BLDV-MCNC: 13 MG/DL (ref 7–22)
CALCIUM SERPL-MCNC: 9.1 MG/DL (ref 8.5–10.5)
CANNABINOID QUANTITATIVE URINE: NEGATIVE
CASTS 2: ABNORMAL /LPF
CASTS UA: ABNORMAL /LPF
CHARACTER, URINE: CLEAR
CHLORIDE BLD-SCNC: 95 MEQ/L (ref 98–111)
CO2: 23 MEQ/L (ref 23–33)
COCAINE METABOLITE QUANTITATIVE URINE: NEGATIVE
COLOR: YELLOW
CREAT SERPL-MCNC: 0.6 MG/DL (ref 0.4–1.2)
CRYSTALS, UA: ABNORMAL
DIGOXIN LEVEL: 1 NG/ML (ref 0.5–2)
EKG ATRIAL RATE: 67 BPM
EKG P AXIS: 99 DEGREES
EKG P-R INTERVAL: 144 MS
EKG Q-T INTERVAL: 498 MS
EKG QRS DURATION: 218 MS
EKG QTC CALCULATION (BAZETT): 526 MS
EKG R AXIS: -82 DEGREES
EKG T AXIS: 94 DEGREES
EKG VENTRICULAR RATE: 67 BPM
EOSINOPHIL # BLD: 4.9 %
EOSINOPHILS ABSOLUTE: 0.3 THOU/MM3 (ref 0–0.4)
EPITHELIAL CELLS, UA: ABNORMAL /HPF
ETHYL ALCOHOL, SERUM: < 0.01 %
GFR SERPL CREATININE-BSD FRML MDRD: > 90 ML/MIN/1.73M2
GLUCOSE BLD-MCNC: 103 MG/DL (ref 70–108)
GLUCOSE URINE: NEGATIVE MG/DL
HCT VFR BLD CALC: 39.3 % (ref 42–52)
HEMOGLOBIN: 13.2 GM/DL (ref 14–18)
INR BLD: 1.1 (ref 0.85–1.13)
KETONES, URINE: NEGATIVE
LEUKOCYTE ESTERASE, URINE: NEGATIVE
LIPASE: 28.2 U/L (ref 5.6–51.3)
LYMPHOCYTES # BLD: 21.2 %
LYMPHOCYTES ABSOLUTE: 1.4 THOU/MM3 (ref 1–4.8)
MAGNESIUM: 2.1 MG/DL (ref 1.6–2.4)
MCH RBC QN AUTO: 30.7 PG (ref 27–31)
MCHC RBC AUTO-ENTMCNC: 33.7 GM/DL (ref 33–37)
MCV RBC AUTO: 91.1 FL (ref 80–94)
MISCELLANEOUS 2: ABNORMAL
MONOCYTES # BLD: 12.3 %
MONOCYTES ABSOLUTE: 0.8 THOU/MM3 (ref 0.4–1.3)
NITRITE, URINE: NEGATIVE
NUCLEATED RED BLOOD CELLS: 0 /100 WBC
OPIATES, URINE: NEGATIVE
OSMOLALITY CALCULATION: 263 MOSMOL/KG (ref 275–300)
OXYCODONE: NEGATIVE
PDW BLD-RTO: 14.3 % (ref 11.5–14.5)
PH UA: 6.5
PHENCYCLIDINE QUANTITATIVE URINE: NEGATIVE
PLATELET # BLD: 308 THOU/MM3 (ref 130–400)
PMV BLD AUTO: 7.3 FL (ref 7.4–10.4)
POTASSIUM SERPL-SCNC: 3.8 MEQ/L (ref 3.5–5.2)
PROSTATE SPECIFIC ANTIGEN: 1.95 NG/ML (ref 0–1)
PROTEIN UA: NEGATIVE
RBC # BLD: 4.31 MILL/MM3 (ref 4.7–6.1)
RBC URINE: > 100 /HPF
RENAL EPITHELIAL, UA: ABNORMAL
SEG NEUTROPHILS: 60.5 %
SEGMENTED NEUTROPHILS ABSOLUTE COUNT: 4 THOU/MM3 (ref 1.8–7.7)
SODIUM BLD-SCNC: 131 MEQ/L (ref 135–145)
SPECIFIC GRAVITY, URINE: 1.01 (ref 1–1.03)
TOTAL PROTEIN: 7.3 G/DL (ref 6.1–8)
TROPONIN T: < 0.01 NG/ML
UROBILINOGEN, URINE: 0.2 EU/DL
WBC # BLD: 6.6 THOU/MM3 (ref 4.8–10.8)
WBC UA: ABNORMAL /HPF
YEAST: ABNORMAL

## 2018-04-17 PROCEDURE — 74177 CT ABD & PELVIS W/CONTRAST: CPT

## 2018-04-17 PROCEDURE — 83690 ASSAY OF LIPASE: CPT

## 2018-04-17 PROCEDURE — G0103 PSA SCREENING: HCPCS

## 2018-04-17 PROCEDURE — 2580000003 HC RX 258: Performed by: EMERGENCY MEDICINE

## 2018-04-17 PROCEDURE — 85610 PROTHROMBIN TIME: CPT

## 2018-04-17 PROCEDURE — 84484 ASSAY OF TROPONIN QUANT: CPT

## 2018-04-17 PROCEDURE — 6360000004 HC RX CONTRAST MEDICATION: Performed by: EMERGENCY MEDICINE

## 2018-04-17 PROCEDURE — 36415 COLL VENOUS BLD VENIPUNCTURE: CPT

## 2018-04-17 PROCEDURE — 81001 URINALYSIS AUTO W/SCOPE: CPT

## 2018-04-17 PROCEDURE — 82248 BILIRUBIN DIRECT: CPT

## 2018-04-17 PROCEDURE — 85025 COMPLETE CBC W/AUTO DIFF WBC: CPT

## 2018-04-17 PROCEDURE — 83735 ASSAY OF MAGNESIUM: CPT

## 2018-04-17 PROCEDURE — G0480 DRUG TEST DEF 1-7 CLASSES: HCPCS

## 2018-04-17 PROCEDURE — 80307 DRUG TEST PRSMV CHEM ANLYZR: CPT

## 2018-04-17 PROCEDURE — 85730 THROMBOPLASTIN TIME PARTIAL: CPT

## 2018-04-17 PROCEDURE — 99284 EMERGENCY DEPT VISIT MOD MDM: CPT

## 2018-04-17 PROCEDURE — 93005 ELECTROCARDIOGRAM TRACING: CPT | Performed by: EMERGENCY MEDICINE

## 2018-04-17 PROCEDURE — 80053 COMPREHEN METABOLIC PANEL: CPT

## 2018-04-17 PROCEDURE — 80162 ASSAY OF DIGOXIN TOTAL: CPT

## 2018-04-17 RX ORDER — DOXYCYCLINE HYCLATE 100 MG
100 TABLET ORAL 2 TIMES DAILY
Qty: 14 TABLET | Refills: 0 | Status: SHIPPED | OUTPATIENT
Start: 2018-04-17 | End: 2018-04-24

## 2018-04-17 RX ORDER — 0.9 % SODIUM CHLORIDE 0.9 %
1000 INTRAVENOUS SOLUTION INTRAVENOUS ONCE
Status: COMPLETED | OUTPATIENT
Start: 2018-04-17 | End: 2018-04-17

## 2018-04-17 RX ADMIN — SODIUM CHLORIDE 1000 ML: 9 INJECTION, SOLUTION INTRAVENOUS at 06:02

## 2018-04-17 RX ADMIN — IOPAMIDOL 80 ML: 755 INJECTION, SOLUTION INTRAVENOUS at 06:01

## 2018-04-17 ASSESSMENT — ENCOUNTER SYMPTOMS
EYE ITCHING: 0
TROUBLE SWALLOWING: 0
BLOOD IN STOOL: 0
ABDOMINAL DISTENTION: 0
SHORTNESS OF BREATH: 0
EYE REDNESS: 0
BACK PAIN: 0
CONSTIPATION: 0
RHINORRHEA: 0
PHOTOPHOBIA: 0
CHOKING: 0
EYE DISCHARGE: 0
EYE PAIN: 0
NAUSEA: 0
VOMITING: 0
COUGH: 0
ABDOMINAL PAIN: 0
CHEST TIGHTNESS: 0
WHEEZING: 0
SORE THROAT: 0
SINUS PRESSURE: 0
VOICE CHANGE: 0
DIARRHEA: 0

## 2018-04-18 ENCOUNTER — OFFICE VISIT (OUTPATIENT)
Dept: FAMILY MEDICINE CLINIC | Age: 78
End: 2018-04-18
Payer: MEDICARE

## 2018-04-18 VITALS
BODY MASS INDEX: 26.08 KG/M2 | SYSTOLIC BLOOD PRESSURE: 120 MMHG | DIASTOLIC BLOOD PRESSURE: 70 MMHG | WEIGHT: 196.8 LBS | HEIGHT: 73 IN | HEART RATE: 76 BPM

## 2018-04-18 DIAGNOSIS — K57.30 DIVERTICULOSIS OF LARGE INTESTINE WITHOUT HEMORRHAGE: ICD-10-CM

## 2018-04-18 DIAGNOSIS — R31.0 GROSS HEMATURIA: Primary | ICD-10-CM

## 2018-04-18 DIAGNOSIS — K80.20 CALCULUS OF GALLBLADDER WITHOUT CHOLECYSTITIS WITHOUT OBSTRUCTION: Chronic | ICD-10-CM

## 2018-04-18 DIAGNOSIS — N20.0 KIDNEY STONE ON RIGHT SIDE: ICD-10-CM

## 2018-04-18 DIAGNOSIS — K40.90 RIGHT INGUINAL HERNIA: ICD-10-CM

## 2018-04-18 DIAGNOSIS — E87.1 HYPONATREMIA: ICD-10-CM

## 2018-04-18 DIAGNOSIS — N40.0 BENIGN NON-NODULAR PROSTATIC HYPERPLASIA WITHOUT LOWER URINARY TRACT SYMPTOMS: ICD-10-CM

## 2018-04-18 PROCEDURE — 99213 OFFICE O/P EST LOW 20 MIN: CPT | Performed by: FAMILY MEDICINE

## 2018-04-18 ASSESSMENT — ENCOUNTER SYMPTOMS: ABDOMINAL PAIN: 0

## 2018-04-23 ENCOUNTER — OFFICE VISIT (OUTPATIENT)
Dept: UROLOGY | Age: 78
End: 2018-04-23
Payer: MEDICARE

## 2018-04-23 VITALS
WEIGHT: 190.6 LBS | BODY MASS INDEX: 25.26 KG/M2 | SYSTOLIC BLOOD PRESSURE: 130 MMHG | DIASTOLIC BLOOD PRESSURE: 72 MMHG | HEIGHT: 73 IN

## 2018-04-23 DIAGNOSIS — R33.9 INCOMPLETE BLADDER EMPTYING: ICD-10-CM

## 2018-04-23 DIAGNOSIS — R31.9 HEMATURIA, UNSPECIFIED TYPE: Primary | ICD-10-CM

## 2018-04-23 DIAGNOSIS — N40.0 ENLARGED PROSTATE: ICD-10-CM

## 2018-04-23 DIAGNOSIS — N41.9 PROSTATITIS, UNSPECIFIED PROSTATITIS TYPE: ICD-10-CM

## 2018-04-23 LAB
BILIRUBIN URINE: NEGATIVE
BLOOD URINE, POC: NEGATIVE
CHARACTER, URINE: CLEAR
COLOR, URINE: YELLOW
GLUCOSE URINE: NEGATIVE MG/DL
KETONES, URINE: NEGATIVE
LEUKOCYTE CLUMPS, URINE: NEGATIVE
NITRITE, URINE: NEGATIVE
PH, URINE: 7
POST VOID RESIDUAL (PVR): 113 ML
PROTEIN, URINE: NEGATIVE MG/DL
SPECIFIC GRAVITY, URINE: 1.01 (ref 1–1.03)
UROBILINOGEN, URINE: 0.2 EU/DL

## 2018-04-23 PROCEDURE — 51798 US URINE CAPACITY MEASURE: CPT | Performed by: UROLOGY

## 2018-04-23 PROCEDURE — 99204 OFFICE O/P NEW MOD 45 MIN: CPT | Performed by: UROLOGY

## 2018-04-23 PROCEDURE — 81003 URINALYSIS AUTO W/O SCOPE: CPT | Performed by: UROLOGY

## 2018-04-23 PROCEDURE — 52000 CYSTOURETHROSCOPY: CPT | Performed by: UROLOGY

## 2018-04-23 ASSESSMENT — LIFESTYLE VARIABLES: TOBACCO_USE: 1

## 2018-04-23 ASSESSMENT — ENCOUNTER SYMPTOMS
COLOR CHANGE: 0
EYE PAIN: 0
NAUSEA: 0
EYE REDNESS: 0
SHORTNESS OF BREATH: 0
CHEST TIGHTNESS: 0
ABDOMINAL PAIN: 0
BACK PAIN: 0
FACIAL SWELLING: 0

## 2018-05-08 ENCOUNTER — OFFICE VISIT (OUTPATIENT)
Dept: FAMILY MEDICINE CLINIC | Age: 78
End: 2018-05-08
Payer: MEDICARE

## 2018-05-08 VITALS
DIASTOLIC BLOOD PRESSURE: 76 MMHG | BODY MASS INDEX: 25.82 KG/M2 | SYSTOLIC BLOOD PRESSURE: 128 MMHG | TEMPERATURE: 97.7 F | HEART RATE: 66 BPM | HEIGHT: 73 IN | WEIGHT: 194.8 LBS

## 2018-05-08 DIAGNOSIS — B96.89 ACUTE BACTERIAL SINUSITIS: Primary | ICD-10-CM

## 2018-05-08 DIAGNOSIS — J01.90 ACUTE BACTERIAL SINUSITIS: Primary | ICD-10-CM

## 2018-05-08 PROCEDURE — 99213 OFFICE O/P EST LOW 20 MIN: CPT | Performed by: FAMILY MEDICINE

## 2018-05-08 RX ORDER — DOXYCYCLINE HYCLATE 100 MG
100 TABLET ORAL 2 TIMES DAILY
Qty: 20 TABLET | Refills: 0 | Status: SHIPPED | OUTPATIENT
Start: 2018-05-08 | End: 2018-05-14

## 2018-05-14 ENCOUNTER — OFFICE VISIT (OUTPATIENT)
Dept: FAMILY MEDICINE CLINIC | Age: 78
End: 2018-05-14
Payer: MEDICARE

## 2018-05-14 VITALS
DIASTOLIC BLOOD PRESSURE: 74 MMHG | HEART RATE: 74 BPM | HEIGHT: 73 IN | WEIGHT: 195.4 LBS | SYSTOLIC BLOOD PRESSURE: 136 MMHG | BODY MASS INDEX: 25.9 KG/M2

## 2018-05-14 DIAGNOSIS — J01.90 ACUTE BACTERIAL SINUSITIS: Primary | ICD-10-CM

## 2018-05-14 DIAGNOSIS — J30.1 ACUTE SEASONAL ALLERGIC RHINITIS DUE TO POLLEN: ICD-10-CM

## 2018-05-14 DIAGNOSIS — B96.89 ACUTE BACTERIAL SINUSITIS: Primary | ICD-10-CM

## 2018-05-14 PROCEDURE — 99213 OFFICE O/P EST LOW 20 MIN: CPT | Performed by: FAMILY MEDICINE

## 2018-05-14 RX ORDER — LORATADINE 10 MG/1
10 TABLET ORAL DAILY
Qty: 30 TABLET | Refills: 0 | Status: SHIPPED | OUTPATIENT
Start: 2018-05-14 | End: 2018-05-31 | Stop reason: ALTCHOICE

## 2018-05-14 RX ORDER — AMOXICILLIN AND CLAVULANATE POTASSIUM 875; 125 MG/1; MG/1
1 TABLET, FILM COATED ORAL 2 TIMES DAILY
Qty: 20 TABLET | Refills: 0 | Status: SHIPPED | OUTPATIENT
Start: 2018-05-14 | End: 2018-05-24

## 2018-05-14 ASSESSMENT — ENCOUNTER SYMPTOMS
SORE THROAT: 0
WHEEZING: 0
COUGH: 1
RHINORRHEA: 0
SHORTNESS OF BREATH: 0
SINUS PRESSURE: 1

## 2018-05-31 ENCOUNTER — HOSPITAL ENCOUNTER (EMERGENCY)
Age: 78
Discharge: HOME | End: 2018-05-31
Payer: MEDICARE

## 2018-05-31 VITALS
RESPIRATION RATE: 18 BRPM | BODY MASS INDEX: 25.18 KG/M2 | SYSTOLIC BLOOD PRESSURE: 166 MMHG | HEIGHT: 73 IN | DIASTOLIC BLOOD PRESSURE: 78 MMHG | OXYGEN SATURATION: 95 % | HEART RATE: 85 BPM | WEIGHT: 190 LBS | TEMPERATURE: 98.4 F

## 2018-05-31 DIAGNOSIS — R04.0 EPISTAXIS: Primary | ICD-10-CM

## 2018-05-31 PROCEDURE — 99213 OFFICE O/P EST LOW 20 MIN: CPT | Performed by: NURSE PRACTITIONER

## 2018-05-31 PROCEDURE — 99212 OFFICE O/P EST SF 10 MIN: CPT

## 2018-05-31 ASSESSMENT — ENCOUNTER SYMPTOMS
VOMITING: 0
COUGH: 0
DIARRHEA: 0
SHORTNESS OF BREATH: 0
NAUSEA: 0

## 2018-07-03 ENCOUNTER — NURSE ONLY (OUTPATIENT)
Dept: FAMILY MEDICINE CLINIC | Age: 78
End: 2018-07-03
Payer: MEDICARE

## 2018-07-03 DIAGNOSIS — Z01.89 ROUTINE LAB DRAW: ICD-10-CM

## 2018-07-03 LAB
ALBUMIN SERPL-MCNC: 4.4 G/DL (ref 3.5–5.1)
ALP BLD-CCNC: 73 U/L (ref 38–126)
ALT SERPL-CCNC: 8 U/L (ref 11–66)
ANION GAP SERPL CALCULATED.3IONS-SCNC: 17 MEQ/L (ref 8–16)
AST SERPL-CCNC: 17 U/L (ref 5–40)
BILIRUB SERPL-MCNC: 0.6 MG/DL (ref 0.3–1.2)
BILIRUBIN DIRECT: < 0.2 MG/DL (ref 0–0.3)
BUN BLDV-MCNC: 13 MG/DL (ref 7–22)
CALCIUM SERPL-MCNC: 10.1 MG/DL (ref 8.5–10.5)
CHLORIDE BLD-SCNC: 95 MEQ/L (ref 98–111)
CHOLESTEROL, TOTAL: 120 MG/DL (ref 100–199)
CO2: 23 MEQ/L (ref 23–33)
CREAT SERPL-MCNC: 0.6 MG/DL (ref 0.4–1.2)
ERYTHROCYTE [DISTWIDTH] IN BLOOD BY AUTOMATED COUNT: 13.9 % (ref 11.5–14.5)
ERYTHROCYTE [DISTWIDTH] IN BLOOD BY AUTOMATED COUNT: 45.7 FL (ref 35–45)
GFR SERPL CREATININE-BSD FRML MDRD: > 90 ML/MIN/1.73M2
GLUCOSE BLD-MCNC: 97 MG/DL (ref 70–108)
HCT VFR BLD CALC: 40.7 % (ref 42–52)
HDLC SERPL-MCNC: 50 MG/DL
HEMOGLOBIN: 13.8 GM/DL (ref 14–18)
LDL CHOLESTEROL CALCULATED: 54 MG/DL
MCH RBC QN AUTO: 30.5 PG (ref 26–33)
MCHC RBC AUTO-ENTMCNC: 33.9 GM/DL (ref 32.2–35.5)
MCV RBC AUTO: 90 FL (ref 80–94)
PLATELET # BLD: 296 THOU/MM3 (ref 130–400)
PMV BLD AUTO: 9.7 FL (ref 9.4–12.4)
POTASSIUM SERPL-SCNC: 4.4 MEQ/L (ref 3.5–5.2)
RBC # BLD: 4.52 MILL/MM3 (ref 4.7–6.1)
SODIUM BLD-SCNC: 135 MEQ/L (ref 135–145)
TOTAL PROTEIN: 8 G/DL (ref 6.1–8)
TRIGL SERPL-MCNC: 81 MG/DL (ref 0–199)
WBC # BLD: 6.9 THOU/MM3 (ref 4.8–10.8)

## 2018-07-03 PROCEDURE — 36415 COLL VENOUS BLD VENIPUNCTURE: CPT | Performed by: FAMILY MEDICINE

## 2018-08-22 DIAGNOSIS — M62.838 MUSCLE SPASM: ICD-10-CM

## 2018-08-22 RX ORDER — BACLOFEN 10 MG/1
10 TABLET ORAL 2 TIMES DAILY PRN
Qty: 180 TABLET | Refills: 0 | Status: SHIPPED | OUTPATIENT
Start: 2018-08-22 | End: 2018-11-05 | Stop reason: SDUPTHER

## 2018-08-22 NOTE — TELEPHONE ENCOUNTER
Patient called requesting refill for his Baclofen to be sent to [facility]. Last received 180 with no refill on 3/28/18. Last visit on 7/31/18 with next appointment scheduled on 9/19/18. Set to send.

## 2018-08-27 ENCOUNTER — OFFICE VISIT (OUTPATIENT)
Dept: FAMILY MEDICINE CLINIC | Age: 78
End: 2018-08-27
Payer: MEDICARE

## 2018-08-27 VITALS
HEIGHT: 73 IN | BODY MASS INDEX: 26.35 KG/M2 | DIASTOLIC BLOOD PRESSURE: 80 MMHG | SYSTOLIC BLOOD PRESSURE: 132 MMHG | WEIGHT: 198.8 LBS | HEART RATE: 76 BPM

## 2018-08-27 DIAGNOSIS — L73.9 FOLLICULITIS: Primary | ICD-10-CM

## 2018-08-27 PROCEDURE — 99213 OFFICE O/P EST LOW 20 MIN: CPT | Performed by: FAMILY MEDICINE

## 2018-08-27 RX ORDER — CEPHALEXIN 500 MG/1
500 CAPSULE ORAL 3 TIMES DAILY
Qty: 30 CAPSULE | Refills: 0 | Status: SHIPPED | OUTPATIENT
Start: 2018-08-27 | End: 2018-09-18 | Stop reason: CLARIF

## 2018-08-27 NOTE — PROGRESS NOTES
words are mis-transcribed.)    Return if symptoms worsen or fail to improve.        Electronically signed by Deejay Vila MD on 8/27/2018 at 1:16 PM

## 2018-09-10 ENCOUNTER — TELEPHONE (OUTPATIENT)
Dept: FAMILY MEDICINE CLINIC | Age: 78
End: 2018-09-10

## 2018-09-18 ENCOUNTER — OFFICE VISIT (OUTPATIENT)
Dept: FAMILY MEDICINE CLINIC | Age: 78
End: 2018-09-18
Payer: MEDICARE

## 2018-09-18 VITALS
HEART RATE: 70 BPM | DIASTOLIC BLOOD PRESSURE: 80 MMHG | TEMPERATURE: 97.9 F | HEIGHT: 73 IN | WEIGHT: 196.2 LBS | BODY MASS INDEX: 26 KG/M2 | SYSTOLIC BLOOD PRESSURE: 126 MMHG

## 2018-09-18 DIAGNOSIS — I10 ESSENTIAL HYPERTENSION: ICD-10-CM

## 2018-09-18 DIAGNOSIS — I25.10 ASHD (ARTERIOSCLEROTIC HEART DISEASE): ICD-10-CM

## 2018-09-18 DIAGNOSIS — L50.9 URTICARIA: Primary | ICD-10-CM

## 2018-09-18 DIAGNOSIS — N40.0 BENIGN NON-NODULAR PROSTATIC HYPERPLASIA WITHOUT LOWER URINARY TRACT SYMPTOMS: ICD-10-CM

## 2018-09-18 DIAGNOSIS — K29.30 CHRONIC SUPERFICIAL GASTRITIS WITHOUT BLEEDING: ICD-10-CM

## 2018-09-18 PROCEDURE — 99214 OFFICE O/P EST MOD 30 MIN: CPT | Performed by: FAMILY MEDICINE

## 2018-09-18 RX ORDER — NIZATIDINE 150 MG/1
150 CAPSULE ORAL 2 TIMES DAILY
Qty: 180 CAPSULE | Refills: 1 | Status: SHIPPED | OUTPATIENT
Start: 2018-09-18 | End: 2018-11-05 | Stop reason: SDUPTHER

## 2018-09-18 RX ORDER — TERAZOSIN 5 MG/1
5 CAPSULE ORAL NIGHTLY
Qty: 90 CAPSULE | Refills: 1 | Status: SHIPPED | OUTPATIENT
Start: 2018-09-18 | End: 2018-11-05 | Stop reason: SDUPTHER

## 2018-09-18 RX ORDER — HYDROCHLOROTHIAZIDE 25 MG/1
25 TABLET ORAL DAILY
Qty: 90 TABLET | Refills: 1 | Status: SHIPPED | OUTPATIENT
Start: 2018-09-18 | End: 2018-11-05 | Stop reason: SDUPTHER

## 2018-09-18 RX ORDER — PREDNISONE 20 MG/1
40 TABLET ORAL DAILY
Qty: 10 TABLET | Refills: 0 | Status: SHIPPED | OUTPATIENT
Start: 2018-09-18 | End: 2018-09-24 | Stop reason: SDUPTHER

## 2018-09-18 RX ORDER — ROSUVASTATIN CALCIUM 5 MG/1
5 TABLET, COATED ORAL NIGHTLY
Qty: 90 TABLET | Refills: 1 | Status: SHIPPED | OUTPATIENT
Start: 2018-09-18 | End: 2018-11-05 | Stop reason: SDUPTHER

## 2018-09-18 ASSESSMENT — ENCOUNTER SYMPTOMS
ANAL BLEEDING: 0
NAUSEA: 0
WHEEZING: 0
SHORTNESS OF BREATH: 0

## 2018-09-18 NOTE — PROGRESS NOTES
[facility]ESSION[facility]  [facility]  [address] [id number]  Dept: [phone]  Dept Fax: [phone]  Loc: [phone]  PROGRESS NOTE      Visit Date: 9/18/2018    Snow Whitman is a 65 y.o. male who presents today for:  Chief Complaint   Patient presents with    Rash     Arms, chest/shoulders, ankles. Itchy/burning but not painful       Subjective:  Rash   Pertinent negatives include no shortness of breath. Rash on right chest/neck and left forearm and right ankle. Started 2-3 days ago on chest.  It itches. Was In [facility] about 1.5 weeks ago. 6 month f/u  (was scheduled for tomorrow and we will address today)    Hypercholesterolemia:  On crestor and lopid. No myalgias. He is also on fish oil. HTN:  On hctz, hytrin, coreg, and captopril. Gastritis. Been on axid for years. No abd pain or nausea. He rides a stationary bike. Review of Systems   Respiratory: Negative for shortness of breath and wheezing. Cardiovascular: Negative for chest pain and leg swelling. Gastrointestinal: Negative for anal bleeding and nausea. Skin: Positive for rash. Neurological: Negative for dizziness and light-headedness.      Past Medical History:   Diagnosis Date    Arthritis     Atrial fibrillation ([address]     CAD (coronary artery disease)     CHF (congestive heart failure) (HCC)     Hyperlipidemia     Hypertension       Past Surgical History:   Procedure Laterality Date   Reji Neal Left 0928'X   Sammie Gale Left 2015    Dr. Zulma Cuellar      x2 Dr. Jd Guan  2016    Dr. Khari Banerjee Right 1960's    inguinal    PACEMAKER INSERTION  2008    Dr. Russell León  2016    Dr. Lashawn Howe History   Problem Relation Age of Onset    Kidney Disease Mother     Cancer Father Flu vaccine (1) 09/01/2018    Potassium monitoring  07/03/2019    Creatinine monitoring  07/03/2019    DTaP/Tdap/Td vaccine (2 - Td) 06/09/2027    Pneumococcal low/med risk  Completed       Objective:  /80 (Site: Right Upper Arm, Position: Sitting, Cuff Size: Large Adult)   Pulse 70   Temp 97.9 °F (36.6 °C) (Oral)   Ht 6' 1\" (1.854 m)   Wt 196 lb 3.2 oz (89 kg)   BMI 25.89 kg/m²   Physical Exam   Constitutional: He is oriented to person, place, and time. He appears well-developed and well-nourished. No distress. Cardiovascular: Normal rate and regular rhythm. No murmur heard. Pulmonary/Chest: Effort normal and breath sounds normal. No respiratory distress. He has no wheezes. Musculoskeletal: He exhibits no edema. Neurological: He is alert and oriented to person, place, and time. Psychiatric: He has a normal mood and affect. His behavior is normal.   Vitals reviewed. Derm:  Urticarial rash on right upper chest/base of neck that is raised erythema without scaling and about 3 cm x 10 cm. Small urticarial circular 1-2 cm lesion on left forearm and right ankle area. Impression/Plan:  1. Urticaria  New problem. Treat with oral prednisone. - predniSONE (DELTASONE) 20 MG tablet; Take 2 tablets by mouth daily for 5 days  Dispense: 10 tablet; Refill: 0    2. ASHD (arteriosclerotic heart disease)  Chronic problem. Continue maximal medical therapy. Continue meds  - rosuvastatin (CRESTOR) 5 MG tablet; Take 1 tablet by mouth nightly  Dispense: 90 tablet; Refill: 1  - terazosin (HYTRIN) 5 MG capsule; Take 1 capsule by mouth nightly  Dispense: 90 capsule; Refill: 1  - hydrochlorothiazide (HYDRODIURIL) 25 MG tablet; Take 1 tablet by mouth daily  Dispense: 90 tablet; Refill: 1    3. Benign non-nodular prostatic hyperplasia without lower urinary tract symptoms  Chronic condition. Controlled. Refill meds. - terazosin (HYTRIN) 5 MG capsule;  Take 1 capsule by mouth nightly  Dispense: 90 capsule; Refill: 1    4. Essential hypertension  Chronic condition. Controlled. Refill meds. - terazosin (HYTRIN) 5 MG capsule; Take 1 capsule by mouth nightly  Dispense: 90 capsule; Refill: 1    5. 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 Controlled. Refill meds. - nizatidine (AXID) 150 MG capsule; Take 1 capsule by mouth 2 times daily  Dispense: 180 capsule; Refill: 1      They voiced understanding. All questions answered. They agreed with treatment plan. Educational materials given - see patient instructions. Discussed use, benefit, and side effects of prescribed medications. Reviewed health maintenance. Return in about 6 months (around 3/18/2019) for HTN, cholesterol. All patient questions answered. Patient voiced understanding.        Electronically signed by Beni Garnica MD on 9/18/2018 at 2:58 PM

## 2018-09-24 ENCOUNTER — TELEPHONE (OUTPATIENT)
Dept: FAMILY MEDICINE CLINIC | Age: 78
End: 2018-09-24

## 2018-09-24 DIAGNOSIS — L50.9 URTICARIA: ICD-10-CM

## 2018-09-24 RX ORDER — PREDNISONE 20 MG/1
40 TABLET ORAL DAILY
Qty: 10 TABLET | Refills: 0 | Status: SHIPPED | OUTPATIENT
Start: 2018-09-24 | End: 2018-09-29

## 2018-10-01 ENCOUNTER — CARE COORDINATION (OUTPATIENT)
Dept: CARE COORDINATION | Age: 78
End: 2018-10-01

## 2018-10-09 ENCOUNTER — NURSE ONLY (OUTPATIENT)
Dept: FAMILY MEDICINE CLINIC | Age: 78
End: 2018-10-09
Payer: MEDICARE

## 2018-10-09 DIAGNOSIS — Z23 NEED FOR PROPHYLACTIC VACCINATION AND INOCULATION AGAINST INFLUENZA: Primary | ICD-10-CM

## 2018-10-09 PROCEDURE — G0008 ADMIN INFLUENZA VIRUS VAC: HCPCS | Performed by: FAMILY MEDICINE

## 2018-10-09 PROCEDURE — 90688 IIV4 VACCINE SPLT 0.5 ML IM: CPT | Performed by: FAMILY MEDICINE

## 2018-10-09 NOTE — PROGRESS NOTES
Immunization(s) given during visit:    Immunizations     Name Date Dose Route    Influenza, Taniyesenia Lakhani, 3 Years and older, IM (Fluzone 3 yrs and older or Afluria 5 yrs and older) 10/9/2018 0.5 mL Intramuscular    Site: Deltoid- Left    Lot: [id number]    NDC: 94124-880-01

## 2018-10-23 ENCOUNTER — OFFICE VISIT (OUTPATIENT)
Dept: UROLOGY | Age: 78
End: 2018-10-23
Payer: MEDICARE

## 2018-10-23 VITALS
BODY MASS INDEX: 27.09 KG/M2 | DIASTOLIC BLOOD PRESSURE: 76 MMHG | HEIGHT: 72 IN | SYSTOLIC BLOOD PRESSURE: 138 MMHG | WEIGHT: 200 LBS

## 2018-10-23 DIAGNOSIS — R33.9 INCOMPLETE BLADDER EMPTYING: ICD-10-CM

## 2018-10-23 DIAGNOSIS — R31.9 HEMATURIA, UNSPECIFIED TYPE: Primary | ICD-10-CM

## 2018-10-23 LAB
BILIRUBIN URINE: NEGATIVE
BLOOD URINE, POC: NEGATIVE
CHARACTER, URINE: CLEAR
COLOR, URINE: YELLOW
GLUCOSE URINE: NEGATIVE MG/DL
KETONES, URINE: NEGATIVE
LEUKOCYTE CLUMPS, URINE: NEGATIVE
NITRITE, URINE: NEGATIVE
PH, URINE: 7
POST VOID RESIDUAL (PVR): 266 ML
PROTEIN, URINE: NEGATIVE MG/DL
SPECIFIC GRAVITY, URINE: 1.01 (ref 1–1.03)
UROBILINOGEN, URINE: 0.2 EU/DL

## 2018-10-23 PROCEDURE — 51798 US URINE CAPACITY MEASURE: CPT | Performed by: NURSE PRACTITIONER

## 2018-10-23 PROCEDURE — 81003 URINALYSIS AUTO W/O SCOPE: CPT | Performed by: NURSE PRACTITIONER

## 2018-10-23 PROCEDURE — 99213 OFFICE O/P EST LOW 20 MIN: CPT | Performed by: NURSE PRACTITIONER

## 2018-10-23 RX ORDER — FINASTERIDE 5 MG/1
5 TABLET, FILM COATED ORAL DAILY
Qty: 90 TABLET | Refills: 3 | Status: SHIPPED | OUTPATIENT
Start: 2018-10-23 | End: 2019-10-11 | Stop reason: SDUPTHER

## 2018-10-23 NOTE — PROGRESS NOTES
daily. 90 tablet 1    gemfibrozil (LOPID) 600 MG tablet Take 1 tablet by mouth 2 times daily (before meals). 180 tablet 1    Coenzyme Q10 (COQ10) 200 MG CAPS Take 200 mg/day by mouth 2 times daily  180 capsule 1    Saw Palmetto, Serenoa repens, 450 MG CAPS Take 425 mg/day by mouth daily. 90 capsule 1     No current facility-administered medications for this visit. Past Medical History  Nae Khan  has a past medical history of Arthritis; Atrial fibrillation ([facility] [address] CAD (coronary artery disease); CHF (congestive heart failure) ([facility] [address] Hyperlipidemia; and Hypertension. Past Surgical History  The patient  has a past surgical history that includes Pacemaker insertion (2008); Bunionectomy (Left, 1990's); Pilonidal cyst drainage (1970); hernia repair (Right, 1960's); Cardiac catheterization; Cataract removal with implant (Left, 2015); Colonoscopy; Colonoscopy (2016); and Upper gastrointestinal endoscopy (2016). Family History  This patient's family history includes Cancer in his father; Kidney Disease in his mother. Social History  Nae Khan  reports that he quit smoking about 53 years ago. He has never used smokeless tobacco. He reports that he does not drink alcohol or use drugs. Subjective:     Review of Systems  No problems with ears, nose or throat. No problems with eyes. No chest pain, shortness of breath, abdominal pain, extremity pain or weakness, and no neurological deficits. No rashes.  symptoms per HPI. The remainder of the review of symptoms is negative. Objective:     PE:   Vitals:    10/23/18 1326   BP: 138/76   Weight: 200 lb (90.7 kg)   Height: 6' (1.829 m)       Constitutional: Alert and oriented times 3, no acute distress and cooperative to examination with appropriate mood and affect. HENT:   Head:        Normocephalic and atraumatic. Mouth/Throat:         Mucous membranes are normal.   Eyes:         EOM are normal. No scleral icterus. PERRLA.    Neck:        Supple, symmetrical, trachea midline  Pulmonary/Chest:      Chest symmetric with normal A/P diameter, Normal respiratory rate and rhthym. No use of accessory muscles. Abdominal:         Soft. No tenderness. Bowel sounds present. Musculoskeletal:         Normal range of motion. No edema or tenderness of lower extremities. Extremities: No cyanosis, clubbing, or edema present. Neurological:        Alert and oriented. No cranial nerve deficit. Cleotha Gills Psychiatric:        Normal mood and affect. Labs   Urine dip demonstrates   Results for POC orders placed in visit on 10/23/18   POCT Urinalysis No Micro (Auto)   Result Value Ref Range    Glucose, Ur Negative NEGATIVE mg/dl    Bilirubin Urine Negative     Ketones, Urine Negative NEGATIVE    Specific Gravity, Urine 1.015 1.002 - 1.03    Blood, UA POC Negative NEGATIVE    pH, Urine 7.00 5.0 - 9.0    Protein, Urine Negative NEGATIVE mg/dl    Urobilinogen, Urine 0.20 0.0 - 1.0 eu/dl    Nitrite, Urine Negative NEGATIVE    Leukocyte Clumps, Urine Negative NEGATIVE    Color, Urine Yellow YELLOW-STR    Character, Urine Clear [id number].[id number]   poct post void residual   Result Value Ref Range    post void residual 266 ml       Patients recent PSA values are as follows  Lab Results   Component Value Date    PSA 1.95 (H) 04/17/2018        Recent BUN/Creatinine:  Lab Results   Component Value Date    BUN 13 07/03/2018    CREATININE 0.6 07/03/2018       Radiology  No recent imaging        Assessment & Plan:     BPH w/ Incomplete bladder emptying  Hx of Hematuria    Gaston f/u today for BPH. He is doing well, reports frequency, nocturia x 1. PVR is elevated at 266 ml. He remains on Hytrin, will start Finasteride 5 mg daily.  F/u in 6 months with PVR      Return in about 6 months (around 4/23/2019) for BPH, PVR on arrival .    SID Flores  Urology

## 2018-10-24 ENCOUNTER — CARE COORDINATION (OUTPATIENT)
Dept: CARE COORDINATION | Age: 78
End: 2018-10-24

## 2018-10-29 ENCOUNTER — CARE COORDINATION (OUTPATIENT)
Dept: CARE COORDINATION | Age: 78
End: 2018-10-29

## 2018-10-29 NOTE — CARE COORDINATION
Attempted to call patient. Left message on voicemail to call office. Carmita Sher, ENRIQUETAN  [address]

## 2018-10-30 ENCOUNTER — CARE COORDINATION (OUTPATIENT)
Dept: CARE COORDINATION | Age: 78
End: 2018-10-30

## 2018-11-05 DIAGNOSIS — K29.30 CHRONIC SUPERFICIAL GASTRITIS WITHOUT BLEEDING: ICD-10-CM

## 2018-11-05 DIAGNOSIS — N40.0 BENIGN NON-NODULAR PROSTATIC HYPERPLASIA WITHOUT LOWER URINARY TRACT SYMPTOMS: ICD-10-CM

## 2018-11-05 DIAGNOSIS — M62.838 MUSCLE SPASM: ICD-10-CM

## 2018-11-05 DIAGNOSIS — I10 ESSENTIAL HYPERTENSION: ICD-10-CM

## 2018-11-05 DIAGNOSIS — I25.10 ASHD (ARTERIOSCLEROTIC HEART DISEASE): ICD-10-CM

## 2018-11-05 RX ORDER — BACLOFEN 10 MG/1
10 TABLET ORAL 2 TIMES DAILY PRN
Qty: 180 TABLET | Refills: 1 | Status: SHIPPED | OUTPATIENT
Start: 2018-11-05 | End: 2019-09-16 | Stop reason: SDUPTHER

## 2018-11-05 RX ORDER — NIZATIDINE 150 MG/1
150 CAPSULE ORAL 2 TIMES DAILY
Qty: 180 CAPSULE | Refills: 1 | Status: SHIPPED | OUTPATIENT
Start: 2018-11-05 | End: 2019-03-18 | Stop reason: SDUPTHER

## 2018-11-05 RX ORDER — HYDROCHLOROTHIAZIDE 25 MG/1
25 TABLET ORAL DAILY
Qty: 90 TABLET | Refills: 1 | Status: SHIPPED | OUTPATIENT
Start: 2018-11-05 | End: 2019-09-16 | Stop reason: SDUPTHER

## 2018-11-05 RX ORDER — TERAZOSIN 5 MG/1
5 CAPSULE ORAL NIGHTLY
Qty: 90 CAPSULE | Refills: 1 | Status: SHIPPED | OUTPATIENT
Start: 2018-11-05 | End: 2019-03-18 | Stop reason: SDUPTHER

## 2018-11-05 RX ORDER — ROSUVASTATIN CALCIUM 5 MG/1
5 TABLET, COATED ORAL NIGHTLY
Qty: 90 TABLET | Refills: 1 | Status: SHIPPED | OUTPATIENT
Start: 2018-11-05 | End: 2019-04-22 | Stop reason: SDUPTHER

## 2019-03-12 ENCOUNTER — TELEPHONE (OUTPATIENT)
Dept: FAMILY MEDICINE CLINIC | Age: 79
End: 2019-03-12

## 2019-03-12 DIAGNOSIS — I25.10 ASHD (ARTERIOSCLEROTIC HEART DISEASE): Primary | ICD-10-CM

## 2019-03-13 ENCOUNTER — NURSE ONLY (OUTPATIENT)
Dept: FAMILY MEDICINE CLINIC | Age: 79
End: 2019-03-13
Payer: MEDICARE

## 2019-03-13 DIAGNOSIS — I25.10 ASHD (ARTERIOSCLEROTIC HEART DISEASE): ICD-10-CM

## 2019-03-13 LAB
ALBUMIN SERPL-MCNC: 4.7 G/DL (ref 3.5–5.1)
ALP BLD-CCNC: 64 U/L (ref 38–126)
ALT SERPL-CCNC: 8 U/L (ref 11–66)
ANION GAP SERPL CALCULATED.3IONS-SCNC: 14 MEQ/L (ref 8–16)
AST SERPL-CCNC: 20 U/L (ref 5–40)
BILIRUB SERPL-MCNC: 0.5 MG/DL (ref 0.3–1.2)
BUN BLDV-MCNC: 14 MG/DL (ref 7–22)
CALCIUM SERPL-MCNC: 9.6 MG/DL (ref 8.5–10.5)
CHLORIDE BLD-SCNC: 99 MEQ/L (ref 98–111)
CHOLESTEROL, TOTAL: 135 MG/DL (ref 100–199)
CO2: 23 MEQ/L (ref 23–33)
CREAT SERPL-MCNC: 0.5 MG/DL (ref 0.4–1.2)
ERYTHROCYTE [DISTWIDTH] IN BLOOD BY AUTOMATED COUNT: 13.9 % (ref 11.5–14.5)
ERYTHROCYTE [DISTWIDTH] IN BLOOD BY AUTOMATED COUNT: 46.6 FL (ref 35–45)
GFR SERPL CREATININE-BSD FRML MDRD: > 90 ML/MIN/1.73M2
GLUCOSE BLD-MCNC: 103 MG/DL (ref 70–108)
HCT VFR BLD CALC: 39.7 % (ref 42–52)
HDLC SERPL-MCNC: 46 MG/DL
HEMOGLOBIN: 13.4 GM/DL (ref 14–18)
LDL CHOLESTEROL CALCULATED: 71 MG/DL
MCH RBC QN AUTO: 31.2 PG (ref 26–33)
MCHC RBC AUTO-ENTMCNC: 33.8 GM/DL (ref 32.2–35.5)
MCV RBC AUTO: 92.3 FL (ref 80–94)
PLATELET # BLD: 240 THOU/MM3 (ref 130–400)
PMV BLD AUTO: 10.5 FL (ref 9.4–12.4)
POTASSIUM SERPL-SCNC: 4.1 MEQ/L (ref 3.5–5.2)
RBC # BLD: 4.3 MILL/MM3 (ref 4.7–6.1)
SODIUM BLD-SCNC: 136 MEQ/L (ref 135–145)
TOTAL PROTEIN: 7.9 G/DL (ref 6.1–8)
TRIGL SERPL-MCNC: 89 MG/DL (ref 0–199)
WBC # BLD: 7.7 THOU/MM3 (ref 4.8–10.8)

## 2019-03-13 PROCEDURE — 36415 COLL VENOUS BLD VENIPUNCTURE: CPT | Performed by: FAMILY MEDICINE

## 2019-03-18 ENCOUNTER — OFFICE VISIT (OUTPATIENT)
Dept: FAMILY MEDICINE CLINIC | Age: 79
End: 2019-03-18
Payer: MEDICARE

## 2019-03-18 VITALS
BODY MASS INDEX: 26.63 KG/M2 | SYSTOLIC BLOOD PRESSURE: 122 MMHG | HEART RATE: 68 BPM | WEIGHT: 196.6 LBS | DIASTOLIC BLOOD PRESSURE: 70 MMHG | HEIGHT: 72 IN

## 2019-03-18 DIAGNOSIS — I25.10 ASHD (ARTERIOSCLEROTIC HEART DISEASE): ICD-10-CM

## 2019-03-18 DIAGNOSIS — N40.0 BENIGN NON-NODULAR PROSTATIC HYPERPLASIA WITHOUT LOWER URINARY TRACT SYMPTOMS: ICD-10-CM

## 2019-03-18 DIAGNOSIS — I10 ESSENTIAL HYPERTENSION: Primary | ICD-10-CM

## 2019-03-18 DIAGNOSIS — K29.30 CHRONIC SUPERFICIAL GASTRITIS WITHOUT BLEEDING: ICD-10-CM

## 2019-03-18 PROCEDURE — 99214 OFFICE O/P EST MOD 30 MIN: CPT | Performed by: FAMILY MEDICINE

## 2019-03-18 RX ORDER — NIZATIDINE 150 MG/1
150 CAPSULE ORAL 2 TIMES DAILY
Qty: 180 CAPSULE | Refills: 1 | Status: SHIPPED | OUTPATIENT
Start: 2019-03-18 | End: 2019-09-16 | Stop reason: SDUPTHER

## 2019-03-18 RX ORDER — TERAZOSIN 5 MG/1
5 CAPSULE ORAL NIGHTLY
Qty: 90 CAPSULE | Refills: 1 | Status: SHIPPED | OUTPATIENT
Start: 2019-03-18 | End: 2019-09-16 | Stop reason: SDUPTHER

## 2019-03-18 ASSESSMENT — PATIENT HEALTH QUESTIONNAIRE - PHQ9
1. LITTLE INTEREST OR PLEASURE IN DOING THINGS: 0
SUM OF ALL RESPONSES TO PHQ QUESTIONS 1-9: 0
2. FEELING DOWN, DEPRESSED OR HOPELESS: 0
SUM OF ALL RESPONSES TO PHQ QUESTIONS 1-9: 0
SUM OF ALL RESPONSES TO PHQ9 QUESTIONS 1 & 2: 0

## 2019-03-18 ASSESSMENT — ENCOUNTER SYMPTOMS
ANAL BLEEDING: 0
SHORTNESS OF BREATH: 0
NAUSEA: 0
WHEEZING: 0

## 2019-04-02 ENCOUNTER — OFFICE VISIT (OUTPATIENT)
Dept: UROLOGY | Age: 79
End: 2019-04-02
Payer: MEDICARE

## 2019-04-02 VITALS
HEIGHT: 73 IN | BODY MASS INDEX: 26.06 KG/M2 | SYSTOLIC BLOOD PRESSURE: 118 MMHG | WEIGHT: 196.6 LBS | DIASTOLIC BLOOD PRESSURE: 68 MMHG

## 2019-04-02 DIAGNOSIS — N40.1 BPH WITH OBSTRUCTION/LOWER URINARY TRACT SYMPTOMS: Primary | ICD-10-CM

## 2019-04-02 DIAGNOSIS — N40.0 ENLARGED PROSTATE: ICD-10-CM

## 2019-04-02 DIAGNOSIS — R33.9 INCOMPLETE BLADDER EMPTYING: ICD-10-CM

## 2019-04-02 DIAGNOSIS — N13.8 BPH WITH OBSTRUCTION/LOWER URINARY TRACT SYMPTOMS: Primary | ICD-10-CM

## 2019-04-02 LAB
BILIRUBIN, POC: NORMAL
BLOOD URINE, POC: NORMAL
CLARITY, POC: NORMAL
COLOR, POC: NORMAL
GLUCOSE URINE, POC: NORMAL
KETONES, POC: NORMAL
LEUKOCYTE EST, POC: NORMAL
NITRITE, POC: NORMAL
PH, POC: NORMAL
POST VOID RESIDUAL (PVR): 131 ML
PROTEIN, POC: NORMAL
SPECIFIC GRAVITY, POC: NORMAL
UROBILINOGEN, POC: NORMAL

## 2019-04-02 PROCEDURE — 51798 US URINE CAPACITY MEASURE: CPT | Performed by: NURSE PRACTITIONER

## 2019-04-02 PROCEDURE — 81002 URINALYSIS NONAUTO W/O SCOPE: CPT | Performed by: NURSE PRACTITIONER

## 2019-04-02 PROCEDURE — 99213 OFFICE O/P EST LOW 20 MIN: CPT | Performed by: NURSE PRACTITIONER

## 2019-04-02 NOTE — PROGRESS NOTES
[facility] PROFESSIONAL SERVS  [facility] UROLOGY  [address]  Dept: [phone]  Loc: [phone]  Visit Date: 4/2/2019      HPI:     Sunita Case f/u today for BPH. He is doing well. He has noticed improvement with Finasteride. Has stronger stream, less frequency through the day and nocturia is improved. Remains on Hytrin 5 mg. PVR is 131 ml  Urinalysis is negative   Cysto on 04/23/18 revealed markedly obstructing prostate with bladder trabeculations. Urine cytology was negative for high grade urothelial carcinoma. Trend of PSA:  1.95 04/17/18  He comes in today by himself. Hx is obtained from the patient and medical record. Current Outpatient Medications   Medication Sig Dispense Refill    terazosin (HYTRIN) 5 MG capsule Take 1 capsule by mouth nightly 90 capsule 1    nizatidine (AXID) 150 MG capsule Take 1 capsule by mouth 2 times daily 180 capsule 1    rosuvastatin (CRESTOR) 5 MG tablet Take 1 tablet by mouth nightly 90 tablet 1    hydrochlorothiazide (HYDRODIURIL) 25 MG tablet Take 1 tablet by mouth daily 90 tablet 1    baclofen (LIORESAL) 10 MG tablet Take 1 tablet by mouth 2 times daily as needed (muscle spasm) 180 tablet 1    finasteride (PROSCAR) 5 MG tablet Take 1 tablet by mouth daily 90 tablet 3    Omega-3 Fatty Acids (FISH OIL) 1000 MG CAPS Take 1,200 mg by mouth 3 times daily      aspirin EC 81 MG EC tablet Take 81 mg by mouth daily as needed       mesalamine (ASACOL HD) 800 MG TBEC TBEC tablet Take 800 mg by mouth daily       captopril (CAPOTEN) 25 MG tablet Take 25 mg by mouth 2 times daily      carvedilol (COREG) 25 MG tablet Take 25 mg by mouth 2 times daily (with meals)      clopidogrel (PLAVIX) 75 MG tablet Take 1 tablet by mouth daily. (Patient taking differently: Take 75 mg by mouth nightly ) 90 tablet 1    digoxin (LANOXIN) 125 MCG tablet Take 1 tablet by mouth daily.  90 tablet 1    gemfibrozil (LOPID) 600 MG tablet Take 1 tablet by mouth 2 times daily (before meals). 180 tablet 1    Coenzyme Q10 (COQ10) 200 MG CAPS Take 200 mg/day by mouth 2 times daily  180 capsule 1    Saw Palmetto, Serenoa repens, 450 MG CAPS Take 425 mg/day by mouth daily. 90 capsule 1     No current facility-administered medications for this visit. Past Medical History  Ariela Plover  has a past medical history of Arthritis, Atrial fibrillation ([facility] [address] CAD (coronary artery disease), CHF (congestive heart failure) ([facility] [address] Hyperlipidemia, and Hypertension. Past Surgical History  The patient  has a past surgical history that includes Pacemaker insertion (2008); Bunionectomy (Left, 1990's); Pilonidal cyst drainage (1970); hernia repair (Right, 1960's); Cardiac catheterization; Cataract removal with implant (Left, 2015); Colonoscopy; Colonoscopy (2016); and Upper gastrointestinal endoscopy (2016). Family History  This patient's family history includes Cancer in his father; Kidney Disease in his mother. Social History  Ariela Cedeñored  reports that he quit smoking about 54 years ago. He has never used smokeless tobacco. He reports that he does not drink alcohol or use drugs. Subjective:     Review of Systems  No problems with ears, nose or throat. No problems with eyes. No chest pain, shortness of breath, abdominal pain, extremity pain or weakness, and no neurological deficits. No rashes.  symptoms per HPI. The remainder of the review of symptoms is negative. Objective:     PE:   Vitals:    04/02/19 1518   BP: 118/68   Weight: 196 lb 9.6 oz (89.2 kg)   Height: 6' 1\" (1.854 m)       Constitutional: Alert and oriented times 3, no acute distress and cooperative to examination with appropriate mood and affect. HENT:   Head:        Normocephalic and atraumatic. Mouth/Throat:         Mucous membranes are normal.   Eyes:         EOM are normal. No scleral icterus. PERRLA.    Neck:        Supple, symmetrical, trachea midline  Pulmonary/Chest:      Chest symmetric with normal A/P diameter, Normal respiratory rate and rhthym. No use of accessory muscles. Abdominal:         Soft. No tenderness. Bowel sounds present. Musculoskeletal:         Normal range of motion. No edema or tenderness of lower extremities. Extremities: No cyanosis, clubbing, or edema present. Neurological:        Alert and oriented. No cranial nerve deficit. Eusebia Mccann Psychiatric:        Normal mood and affect. Labs   Urine dip demonstrates   Results for POC orders placed in visit on 04/02/19   poct post void residual   Result Value Ref Range    post void residual 131 ml   POCT Urinalysis no Micro   Result Value Ref Range    Color, UA      Clarity, UA      Glucose, UA POC      Bilirubin, UA      Ketones, UA      Spec Grav, UA      Blood, UA POC neg     pH, UA      Protein, UA POC      Urobilinogen, UA      Leukocytes, UA neg     Nitrite, UA neg        Patients recent PSA values are as follows  Lab Results   Component Value Date    PSA 1.95 (H) 04/17/2018        Recent BUN/Creatinine:  Lab Results   Component Value Date    BUN 14 03/13/2019    CREATININE 0.5 03/13/2019       Radiology  No recent imaging        Assessment & Plan:     BPH w/ LUTS  Hx of Hematuria    Gaston f/u today for BPH. He is doing well, LUTS have improved with addition of Finasteride which is good. He is tolerating well with no side effects. PVR has improved to 131 ml today. He will continue on Hytrin and Finasteride. If symptoms worsen in future, would recommend TURP. Return in about 1 year (around 4/2/2020) for BPH.     SID Montalvo  Urology

## 2019-04-22 DIAGNOSIS — I25.10 ASHD (ARTERIOSCLEROTIC HEART DISEASE): ICD-10-CM

## 2019-04-22 RX ORDER — ROSUVASTATIN CALCIUM 5 MG/1
5 TABLET, COATED ORAL NIGHTLY
Qty: 90 TABLET | Refills: 1 | Status: SHIPPED | OUTPATIENT
Start: 2019-04-22 | End: 2019-09-16 | Stop reason: SDUPTHER

## 2019-04-22 NOTE — TELEPHONE ENCOUNTER
Chandler Jiménez called requesting a refill on the following medications:  Requested Prescriptions     Pending Prescriptions Disp Refills    rosuvastatin (CRESTOR) 5 MG tablet 90 tablet 1     Sig: Take 1 tablet by mouth nightly       Date of last visit: 3/18/2019  Date of next visit (if applicable):9/16/2019  Date of last refill: #90 x 1 on 11/5/2018  Pharm[facility]      Thanks,  Deidra Shone, RN

## 2019-05-07 ENCOUNTER — OFFICE VISIT (OUTPATIENT)
Dept: FAMILY MEDICINE CLINIC | Age: 79
End: 2019-05-07
Payer: MEDICARE

## 2019-05-07 ENCOUNTER — HOSPITAL ENCOUNTER (OUTPATIENT)
Dept: GENERAL RADIOLOGY | Age: 79
Discharge: HOME | End: 2019-05-07
Payer: MEDICARE

## 2019-05-07 ENCOUNTER — HOSPITAL ENCOUNTER (OUTPATIENT)
Age: 79
Discharge: HOME | End: 2019-05-07
Payer: MEDICARE

## 2019-05-07 VITALS
WEIGHT: 199.2 LBS | HEART RATE: 67 BPM | HEIGHT: 73 IN | BODY MASS INDEX: 26.4 KG/M2 | DIASTOLIC BLOOD PRESSURE: 70 MMHG | TEMPERATURE: 98.2 F | SYSTOLIC BLOOD PRESSURE: 124 MMHG

## 2019-05-07 DIAGNOSIS — M25.561 ACUTE PAIN OF RIGHT KNEE: Primary | ICD-10-CM

## 2019-05-07 DIAGNOSIS — M25.461 EFFUSION OF RIGHT KNEE: ICD-10-CM

## 2019-05-07 DIAGNOSIS — M25.561 ACUTE PAIN OF RIGHT KNEE: ICD-10-CM

## 2019-05-07 PROCEDURE — 99213 OFFICE O/P EST LOW 20 MIN: CPT | Performed by: FAMILY MEDICINE

## 2019-05-07 PROCEDURE — 73564 X-RAY EXAM KNEE 4 OR MORE: CPT

## 2019-05-07 RX ORDER — PREDNISONE 20 MG/1
20 TABLET ORAL 2 TIMES DAILY
Qty: 10 TABLET | Refills: 0 | Status: SHIPPED | OUTPATIENT
Start: 2019-05-07 | End: 2019-05-12

## 2019-05-07 NOTE — PROGRESS NOTES
[facility] PROFESSIONAL SERVS  [facility]  [address] [address]  Dept: [phone]  Dept Fax: [phone]  Loc: [phone]  PROGRESS NOTE      Visit Date: 5/7/2019    Hardeep Meng is a 77 y.o. male who presents today for:  Chief Complaint   Patient presents with    Edema     rt knee, since Friday, hurts to bend       Subjective:  HPI    Right knee pain:  Started with pressure and pain 4 days ago. No injury. He tried ice. Same symptoms over past 4 days. Not much pain at rest.   No hx of gout. Hx of bursitis about 20 years ago. Review of Systems   Musculoskeletal: Positive for arthralgias and gait problem. Skin: Negative for wound.        Past Medical History:   Diagnosis Date    Arthritis     Atrial fibrillation ([address]     CAD (coronary artery disease)     CHF (congestive heart failure) (HCC)     Hyperlipidemia     Hypertension       Current Outpatient Medications   Medication Sig Dispense Refill    rosuvastatin (CRESTOR) 5 MG tablet Take 1 tablet by mouth nightly 90 tablet 1    terazosin (HYTRIN) 5 MG capsule Take 1 capsule by mouth nightly 90 capsule 1    nizatidine (AXID) 150 MG capsule Take 1 capsule by mouth 2 times daily 180 capsule 1    hydrochlorothiazide (HYDRODIURIL) 25 MG tablet Take 1 tablet by mouth daily 90 tablet 1    baclofen (LIORESAL) 10 MG tablet Take 1 tablet by mouth 2 times daily as needed (muscle spasm) 180 tablet 1    finasteride (PROSCAR) 5 MG tablet Take 1 tablet by mouth daily 90 tablet 3    Omega-3 Fatty Acids (FISH OIL) 1000 MG CAPS Take 1,200 mg by mouth 3 times daily      aspirin EC 81 MG EC tablet Take 81 mg by mouth daily as needed       mesalamine (ASACOL HD) 800 MG TBEC TBEC tablet Take 800 mg by mouth daily       captopril (CAPOTEN) 25 MG tablet Take 25 mg by mouth 2 times daily      carvedilol (COREG) 25 MG tablet Take 25 mg by mouth 2 times daily (with meals)      clopidogrel (PLAVIX) 75 MG tablet Take 1 tablet by mouth daily. (Patient taking differently: Take 75 mg by mouth nightly ) 90 tablet 1    digoxin (LANOXIN) 125 MCG tablet Take 1 tablet by mouth daily. 90 tablet 1    gemfibrozil (LOPID) 600 MG tablet Take 1 tablet by mouth 2 times daily (before meals). 180 tablet 1    Coenzyme Q10 (COQ10) 200 MG CAPS Take 200 mg/day by mouth 2 times daily  180 capsule 1    Saw Palmetto, Serenoa repens, 450 MG CAPS Take 425 mg/day by mouth daily. 90 capsule 1     No current facility-administered medications for this visit. Allergies   Allergen Reactions    Latex Rash    Gluten Meal     Vasotec [Enalaprilat] Other (See Comments)     cough    Neosporin [Neomycin-Polymyxin-Gramicidin] Rash    Sulfa Antibiotics Rash       Objective:     /70 (Site: Left Upper Arm, Position: Sitting, Cuff Size: Medium Adult)   Pulse 67   Temp 98.2 °F (36.8 °C) (Oral)   Ht 6' 1\" (1.854 m)   Wt 199 lb 3.2 oz (90.4 kg)   BMI 26.28 kg/m²   Physical Exam   Constitutional: He is oriented to person, place, and time. He appears well-developed and well-nourished. Neurological: He is alert and oriented to person, place, and time. Psychiatric: He has a normal mood and affect. His behavior is normal.   Vitals reviewed. right knee exam: no ecchymosis. Minimal erythema of anterior knee. diffuse mild tenderness. moderate joint effusion. ROM 0-90 degrees in flexion and extension. 4+/5 strength for flexion and extension. Negative anterior drawer. Negative posterior drawer. Negative Keisha. Negative valgus and varus. Calf is soft. Impression/Plan:  1. Acute pain of right knee  New problem of acute pain of right knee with effusion. Discussed differential diagnosis with patient including inflammatory arthritis, gout or pseudogout, arthritis flare, bony injury, etc.  Unlikely that this is infectious etiology. I think the most likely diagnosis is inflammatory arthritis.   We discussed aspiration of the knee to further evaluate for cause which patient declined. We'll obtain an x-ray to look for arthritis. Treat empirically with prednisone. If not improving in 2 days, I would like to see him back in the office to reevaluate and consider aspiration.  -walking as tolerated  - XR KNEE RIGHT (MIN 4 VIEWS); Future  - predniSONE (DELTASONE) 20 MG tablet; Take 1 tablet by mouth 2 times daily for 5 days  Dispense: 10 tablet; Refill: 0    2. Effusion of right knee  - XR KNEE RIGHT (MIN 4 VIEWS); Future  - predniSONE (DELTASONE) 20 MG tablet; Take 1 tablet by mouth 2 times daily for 5 days  Dispense: 10 tablet; Refill: 0      They voiced understanding. All questions answered. They agreed with treatment plan. See patient instructions for any educational materials that may have been given. Discussed use, benefit, and side effects of prescribed medications. (Please note that portions of this note may have been completed with a voice recognition program.  Efforts were made to edit the dictation but occasionally words are mis-transcribed.)    Return if symptoms worsen or fail to improve.        Electronically signed by Reva Romo MD on 5/7/2019 at 3:49 PM

## 2019-05-08 PROBLEM — M17.11 LOCALIZED OSTEOARTHRITIS OF RIGHT KNEE: Status: ACTIVE | Noted: 2019-05-08

## 2019-05-08 PROBLEM — Z87.39 PERSONAL HISTORY OF CALCIUM PYROPHOSPHATE DEPOSITION DISEASE (CPPD): Status: ACTIVE | Noted: 2019-05-08

## 2019-05-13 ENCOUNTER — TELEPHONE (OUTPATIENT)
Dept: FAMILY MEDICINE CLINIC | Age: 79
End: 2019-05-13

## 2019-05-13 NOTE — TELEPHONE ENCOUNTER
Rafal Barcenas phoned- states that he finished the Prednisone which helped his knee but he is still having swelling in his Right Knee. Painful to stand for long periods as well. Wanting to know if he should be taking another round of Prednisone or what the next step should be?  Patient uses [facility]

## 2019-05-15 ENCOUNTER — PROCEDURE VISIT (OUTPATIENT)
Dept: FAMILY MEDICINE CLINIC | Age: 79
End: 2019-05-15
Payer: MEDICARE

## 2019-05-15 VITALS
DIASTOLIC BLOOD PRESSURE: 74 MMHG | BODY MASS INDEX: 26.56 KG/M2 | HEART RATE: 80 BPM | HEIGHT: 73 IN | SYSTOLIC BLOOD PRESSURE: 130 MMHG | WEIGHT: 200.4 LBS

## 2019-05-15 DIAGNOSIS — M25.561 ACUTE PAIN OF RIGHT KNEE: Primary | ICD-10-CM

## 2019-05-15 DIAGNOSIS — Z87.39 H/O CALCIUM PYROPHOSPHATE DEPOSITION DISEASE (CPPD): ICD-10-CM

## 2019-05-15 PROCEDURE — 20610 DRAIN/INJ JOINT/BURSA W/O US: CPT | Performed by: FAMILY MEDICINE

## 2019-05-15 RX ORDER — TRIAMCINOLONE ACETONIDE 40 MG/ML
80 INJECTION, SUSPENSION INTRA-ARTICULAR; INTRAMUSCULAR ONCE
Status: COMPLETED | OUTPATIENT
Start: 2019-05-15 | End: 2019-05-15

## 2019-05-15 RX ORDER — LIDOCAINE HYDROCHLORIDE 10 MG/ML
4 INJECTION, SOLUTION EPIDURAL; INFILTRATION; INTRACAUDAL; PERINEURAL ONCE
Status: COMPLETED | OUTPATIENT
Start: 2019-05-15 | End: 2019-05-15

## 2019-05-15 RX ORDER — BUPIVACAINE HYDROCHLORIDE 5 MG/ML
4 INJECTION, SOLUTION PERINEURAL ONCE
Status: COMPLETED | OUTPATIENT
Start: 2019-05-15 | End: 2019-05-15

## 2019-05-15 RX ADMIN — LIDOCAINE HYDROCHLORIDE 4 ML: 10 INJECTION, SOLUTION EPIDURAL; INFILTRATION; INTRACAUDAL; PERINEURAL at 09:03

## 2019-05-15 RX ADMIN — TRIAMCINOLONE ACETONIDE 80 MG: 40 INJECTION, SUSPENSION INTRA-ARTICULAR; INTRAMUSCULAR at 09:04

## 2019-05-15 RX ADMIN — BUPIVACAINE HYDROCHLORIDE 20 MG: 5 INJECTION, SOLUTION PERINEURAL at 09:02

## 2019-05-15 NOTE — PROGRESS NOTES
[address]ICINE [address] [facility] [place] [address] [phone]      Procedure Note Right Knee Injection    Diagnosis:  Acute right knee pain due to CPPD (pseudogout). I reviewed the x-ray images with the patient. Discussed his pseudogout diagnosis. He has no effusion on exam so aspiration was not performed. He continues with right knee pain despite oral prednisone. Discussed risks and benefits of steroid injection, including but not limited to infection, skin discoloration, pain, and bleeding. With the patient's verbal informed consent, his right knee was prepped  in standard sterile fashion with Betadine and Alcohol. Ethyl chloride was used to anesthetize the skin. A mixture of 4 cc of 0.5% Bupivacaine, 4 cc of 1% Lidocaine,  and 2 cc of Kenalog 40 mg was injected into the right anterior lateral joint space. The patient tolerated this well without difficulty. A band-aid was applied and the patient was advised to ice the knee for 15-20 minutes to relieve any injection site related pain. Follow Up: As Directed.     Araceli Hoang MD

## 2019-07-05 ENCOUNTER — HOSPITAL ENCOUNTER (OUTPATIENT)
Age: 79
Discharge: HOME | End: 2019-07-05
Payer: MEDICARE

## 2019-07-05 LAB
ALBUMIN SERPL-MCNC: 4.6 G/DL (ref 3.5–5.1)
ALP BLD-CCNC: 61 U/L (ref 38–126)
ALT SERPL-CCNC: 7 U/L (ref 11–66)
ANION GAP SERPL CALCULATED.3IONS-SCNC: 17 MEQ/L (ref 8–16)
AST SERPL-CCNC: 19 U/L (ref 5–40)
BILIRUB SERPL-MCNC: 0.7 MG/DL (ref 0.3–1.2)
BILIRUBIN DIRECT: < 0.2 MG/DL (ref 0–0.3)
BUN BLDV-MCNC: 17 MG/DL (ref 7–22)
CALCIUM SERPL-MCNC: 9.9 MG/DL (ref 8.5–10.5)
CHLORIDE BLD-SCNC: 100 MEQ/L (ref 98–111)
CHOLESTEROL, TOTAL: 118 MG/DL (ref 100–199)
CO2: 22 MEQ/L (ref 23–33)
CREAT SERPL-MCNC: 0.6 MG/DL (ref 0.4–1.2)
GFR SERPL CREATININE-BSD FRML MDRD: > 90 ML/MIN/1.73M2
GLUCOSE BLD-MCNC: 108 MG/DL (ref 70–108)
HDLC SERPL-MCNC: 44 MG/DL
LDL CHOLESTEROL CALCULATED: 58 MG/DL
POTASSIUM SERPL-SCNC: 3.9 MEQ/L (ref 3.5–5.2)
SODIUM BLD-SCNC: 139 MEQ/L (ref 135–145)
TOTAL PROTEIN: 7.8 G/DL (ref 6.1–8)
TRIGL SERPL-MCNC: 79 MG/DL (ref 0–199)

## 2019-07-05 PROCEDURE — 80061 LIPID PANEL: CPT

## 2019-07-05 PROCEDURE — 80053 COMPREHEN METABOLIC PANEL: CPT

## 2019-07-05 PROCEDURE — 36415 COLL VENOUS BLD VENIPUNCTURE: CPT

## 2019-07-05 PROCEDURE — 82248 BILIRUBIN DIRECT: CPT

## 2019-07-25 ENCOUNTER — HOSPITAL ENCOUNTER (OUTPATIENT)
Age: 79
Discharge: HOME | End: 2019-07-25
Payer: MEDICARE

## 2019-07-25 LAB — C DIFF TOXIN/ANTIGEN: NEGATIVE

## 2019-07-25 PROCEDURE — 87252 VIRUS INOCULATION TISSUE: CPT

## 2019-07-25 PROCEDURE — 87449 NOS EACH ORGANISM AG IA: CPT

## 2019-08-02 LAB — VIRAL CULTURE: NORMAL

## 2019-09-16 ENCOUNTER — OFFICE VISIT (OUTPATIENT)
Dept: FAMILY MEDICINE CLINIC | Age: 79
End: 2019-09-16
Payer: MEDICARE

## 2019-09-16 VITALS
DIASTOLIC BLOOD PRESSURE: 76 MMHG | SYSTOLIC BLOOD PRESSURE: 120 MMHG | WEIGHT: 196.2 LBS | HEART RATE: 70 BPM | BODY MASS INDEX: 26 KG/M2 | HEIGHT: 73 IN

## 2019-09-16 DIAGNOSIS — I10 ESSENTIAL HYPERTENSION: ICD-10-CM

## 2019-09-16 DIAGNOSIS — I25.10 ASHD (ARTERIOSCLEROTIC HEART DISEASE): ICD-10-CM

## 2019-09-16 DIAGNOSIS — N40.0 BENIGN NON-NODULAR PROSTATIC HYPERPLASIA WITHOUT LOWER URINARY TRACT SYMPTOMS: ICD-10-CM

## 2019-09-16 DIAGNOSIS — K29.30 CHRONIC SUPERFICIAL GASTRITIS WITHOUT BLEEDING: ICD-10-CM

## 2019-09-16 DIAGNOSIS — Z00.00 ROUTINE GENERAL MEDICAL EXAMINATION AT A HEALTH CARE FACILITY: Primary | ICD-10-CM

## 2019-09-16 DIAGNOSIS — M62.838 MUSCLE SPASM: ICD-10-CM

## 2019-09-16 PROCEDURE — G0438 PPPS, INITIAL VISIT: HCPCS | Performed by: FAMILY MEDICINE

## 2019-09-16 RX ORDER — ROSUVASTATIN CALCIUM 5 MG/1
5 TABLET, COATED ORAL NIGHTLY
Qty: 90 TABLET | Refills: 1 | Status: SHIPPED | OUTPATIENT
Start: 2019-09-16 | End: 2020-03-16 | Stop reason: SDUPTHER

## 2019-09-16 RX ORDER — BACLOFEN 10 MG/1
10 TABLET ORAL 2 TIMES DAILY PRN
Qty: 180 TABLET | Refills: 1 | Status: SHIPPED | OUTPATIENT
Start: 2019-09-16 | End: 2020-03-16 | Stop reason: SDUPTHER

## 2019-09-16 RX ORDER — FINASTERIDE 5 MG/1
5 TABLET, FILM COATED ORAL DAILY
Qty: 90 TABLET | Refills: 3 | Status: CANCELLED | OUTPATIENT
Start: 2019-09-16 | End: 2020-09-15

## 2019-09-16 RX ORDER — HYDROCHLOROTHIAZIDE 25 MG/1
25 TABLET ORAL DAILY
Qty: 90 TABLET | Refills: 1 | Status: SHIPPED | OUTPATIENT
Start: 2019-09-16 | End: 2020-03-16 | Stop reason: SDUPTHER

## 2019-09-16 RX ORDER — TERAZOSIN 5 MG/1
5 CAPSULE ORAL NIGHTLY
Qty: 90 CAPSULE | Refills: 1 | Status: SHIPPED | OUTPATIENT
Start: 2019-09-16 | End: 2020-03-16 | Stop reason: SDUPTHER

## 2019-09-16 RX ORDER — NIZATIDINE 150 MG/1
150 CAPSULE ORAL 2 TIMES DAILY
Qty: 180 CAPSULE | Refills: 1 | Status: SHIPPED | OUTPATIENT
Start: 2019-09-16 | End: 2020-03-16 | Stop reason: SDUPTHER

## 2019-09-16 ASSESSMENT — PATIENT HEALTH QUESTIONNAIRE - PHQ9
SUM OF ALL RESPONSES TO PHQ QUESTIONS 1-9: 0
SUM OF ALL RESPONSES TO PHQ QUESTIONS 1-9: 0

## 2019-09-16 ASSESSMENT — LIFESTYLE VARIABLES: HOW OFTEN DO YOU HAVE A DRINK CONTAINING ALCOHOL: 0

## 2019-09-16 NOTE — PROGRESS NOTES
Medicare Annual Wellness Visit  Name: Meghan Beckford Date: 2019   MRN: [id number] Sex: Male   Age: 77 y.o. Ethnicity: Non-/Non    : 1940 Race: Emi Cheung is here for 6 Month Follow-Up; Hypertension; and Medicare AWV    Screenings for behavioral, psychosocial and functional/safety risks, and cognitive dysfunction are all negative except as indicated below. These results, as well as other patient data [facility] [address] [facility], are documented in Flowsheets linked to this Encounter. Allergies   Allergen Reactions    Latex Rash    Gluten Meal     Vasotec [Enalaprilat] Other (See Comments)     cough    Neosporin [Neomycin-Polymyxin-Gramicidin] Rash    Sulfa Antibiotics Rash     Prior to Visit Medications    Medication Sig Taking? Authorizing Provider   rosuvastatin (CRESTOR) 5 MG tablet Take 1 tablet by mouth nightly Yes Yudy Porter MD   terazosin (HYTRIN) 5 MG capsule Take 1 capsule by mouth nightly Yes Yudy Porter MD   nizatidine (AXID) 150 MG capsule Take 1 capsule by mouth 2 times daily Yes Yudy Porter MD   hydrochlorothiazide (HYDRODIURIL) 25 MG tablet Take 1 tablet by mouth daily Yes Yudy Porter MD   baclofen (LIORESAL) 10 MG tablet Take 1 tablet by mouth 2 times daily as needed (muscle spasm) Yes Yudy Porter MD   finasteride (PROSCAR) 5 MG tablet Take 1 tablet by mouth daily Yes Sharon Holden, APRN - CNP   Omega-3 Fatty Acids (FISH OIL) 1000 MG CAPS Take 1,200 mg by mouth 3 times daily Yes Historical Provider, MD   mesalamine (ASACOL HD) 800 MG TBEC TBEC tablet Take 800 mg by mouth daily  Yes Historical Provider, MD   captopril (CAPOTEN) 25 MG tablet Take 25 mg by mouth 2 times daily Yes Historical Provider, MD   carvedilol (COREG) 25 MG tablet Take 25 mg by mouth 2 times daily (with meals) Yes Historical Provider, MD   clopidogrel (PLAVIX) 75 MG tablet Take 1 tablet by mouth daily.   Patient taking differently: Take 75 place, and time. He appears well-developed and well-nourished. No distress. Cardiovascular: Normal rate and regular rhythm. No murmur heard. Pulmonary/Chest: Effort normal and breath sounds normal. No respiratory distress. He has no wheezes. Musculoskeletal: He exhibits no edema. Neurological: He is alert and oriented to person, place, and time. Psychiatric: He has a normal mood and affect. His behavior is normal.   Vitals reviewed. Patient's complete Health Risk Assessment and screening values have been reviewed and are found in Flowsheets. The following problems were reviewed today and where indicated follow up appointments were made and/or referrals ordered.     Positive Risk Factor Screenings with Interventions:     Hearing/Vision:  No exam data present  Hearing/Vision  Do you or your family notice any trouble with your hearing?: No  Do you have difficulty driving, watching TV, or doing any of your daily activities because of your eyesight?: No  Have you had an eye exam within the past year?: (!) No  Hearing/Vision Interventions:  · Vision concerns:  patient encouraged to make appointment with his/her eye specialist    Safety:  Safety  Do you have working smoke detectors?: Yes  Have all throw rugs been removed or fastened?: Yes  Do you have non-slip mats or surfaces in all bathtubs/showers?: Yes  Do all of your stairways have a railing or banister?: (!) No(do not have stairs)  Are your doorways, halls and stairs free of clutter?: Yes  Do you always fasten your seatbelt when you are in a car?: Yes  Safety Interventions:  · Patient declines any further evaluation/treatment for this issue    Personalized Preventive Plan   Current Health Maintenance Status  Immunization History   Administered Date(s) Administered    Influenza Vaccine, unspecified formulation 10/14/2016    Influenza Virus Vaccine 09/18/2017    Influenza, Quadv, IM, (6 mo and older Fluzone, Flulaval, Fluarix and 3 yrs and older

## 2019-09-16 NOTE — PATIENT INSTRUCTIONS
example, this would mean 60 grams of fat or less per day. Saturated fat and trans fat in your diet raises your blood cholesterol the most, much more than dietary cholesterol does. For this reason, on a heart-healthy diet, less than 7% of your calories should come from saturated fat and ideally 0% from trans fat. On an 1800-calorie diet, this translates into less than 14 grams of saturated fat per day, leaving 46 grams of fat to come from mono- and polyunsaturated fats.    Food Choices on a Heart Healthy Diet   Food Category   Foods Recommended   Foods to Avoid   Grains   Breads and rolls without salted tops Most dry and cooked cereals Unsalted crackers and breadsticks Low-sodium or homemade breadcrumbs or stuffing All rice and pastas   Breads, rolls, and crackers with salted tops High-fat baked goods (eg, muffins, donuts, pastries) Quick breads, self-rising flour, and biscuit mixes Regular bread crumbs Instant hot cereals Commercially prepared rice, pasta, or stuffing mixes   Vegetables   Most fresh, frozen, and low-sodium canned vegetables Low-sodium and salt-free vegetable juices Canned vegetables if unsalted or rinsed   Regular canned vegetables and juices, including sauerkraut and pickled vegetables Frozen vegetables with sauces Commercially prepared potato and vegetable mixes   Fruits   Most fresh, frozen, and canned fruits All fruit juices   Fruits processed with salt or sodium   Milk   Nonfat or low-fat (1%) milk Nonfat or low-fat yogurt Cottage cheese, low-fat ricotta, cheeses labeled as low-fat and low-sodium   Whole milk Reduced-fat (2%) milk Malted and chocolate milk Full fat yogurt Most cheeses (unless low-fat and low salt) Buttermilk (no more than 1 cup per week)   Meats and Beans   Lean cuts of fresh or frozen beef, veal, lamb, or pork (look for the word loin) Fresh or frozen poultry without the skin Fresh or frozen fish and some shellfish Egg whites and egg substitutes (Limit whole eggs to three per and cholesterol amounts. For products low in sodium, look for sodium free, very low sodium, low sodium, no added salt, and unsalted   Skip the salt when cooking or at the table; if food needs more flavor, get creative and try out different herbs and spices. Garlic and onion also add substantial flavor to foods. Trim any visible fat off meat and poultry before cooking, and drain the fat off after frye. Use cooking methods that require little or no added fat, such as grilling, boiling, baking, poaching, broiling, roasting, steaming, stir-frying, and sauting. Avoid fast food and convenience food. They tend to be high in saturated and trans fat and have a lot of added salt. Talk to a registered dietitian for individualized diet advice.       Last Reviewed: March 2011 Rafael Cobian MS, MPH, RD   Updated: 3/29/2011   ·

## 2019-10-03 ENCOUNTER — OFFICE VISIT (OUTPATIENT)
Dept: FAMILY MEDICINE CLINIC | Age: 79
End: 2019-10-03
Payer: MEDICARE

## 2019-10-03 VITALS
DIASTOLIC BLOOD PRESSURE: 80 MMHG | BODY MASS INDEX: 26.06 KG/M2 | WEIGHT: 196.6 LBS | SYSTOLIC BLOOD PRESSURE: 130 MMHG | HEIGHT: 73 IN | HEART RATE: 80 BPM

## 2019-10-03 DIAGNOSIS — G89.29 CHRONIC PAIN OF RIGHT KNEE: Primary | ICD-10-CM

## 2019-10-03 DIAGNOSIS — Z87.39 H/O CALCIUM PYROPHOSPHATE DEPOSITION DISEASE (CPPD): ICD-10-CM

## 2019-10-03 DIAGNOSIS — M25.561 CHRONIC PAIN OF RIGHT KNEE: Primary | ICD-10-CM

## 2019-10-03 PROCEDURE — 20610 DRAIN/INJ JOINT/BURSA W/O US: CPT | Performed by: FAMILY MEDICINE

## 2019-10-03 PROCEDURE — 99213 OFFICE O/P EST LOW 20 MIN: CPT | Performed by: FAMILY MEDICINE

## 2019-10-03 RX ORDER — TRIAMCINOLONE ACETONIDE 40 MG/ML
80 INJECTION, SUSPENSION INTRA-ARTICULAR; INTRAMUSCULAR ONCE
Status: COMPLETED | OUTPATIENT
Start: 2019-10-03 | End: 2019-10-03

## 2019-10-03 RX ORDER — LIDOCAINE HYDROCHLORIDE 10 MG/ML
4 INJECTION, SOLUTION INFILTRATION; PERINEURAL ONCE
Status: COMPLETED | OUTPATIENT
Start: 2019-10-03 | End: 2019-10-03

## 2019-10-03 RX ORDER — BUPIVACAINE HYDROCHLORIDE 5 MG/ML
4 INJECTION, SOLUTION PERINEURAL ONCE
Status: COMPLETED | OUTPATIENT
Start: 2019-10-03 | End: 2019-10-03

## 2019-10-03 RX ORDER — LIDOCAINE HYDROCHLORIDE 10 MG/ML
4 INJECTION, SOLUTION EPIDURAL; INFILTRATION; INTRACAUDAL; PERINEURAL ONCE
Status: DISCONTINUED | OUTPATIENT
Start: 2019-10-03 | End: 2019-10-03

## 2019-10-03 RX ADMIN — LIDOCAINE HYDROCHLORIDE 4 ML: 10 INJECTION, SOLUTION INFILTRATION; PERINEURAL at 11:12

## 2019-10-03 RX ADMIN — BUPIVACAINE HYDROCHLORIDE 20 MG: 5 INJECTION, SOLUTION PERINEURAL at 10:47

## 2019-10-03 RX ADMIN — TRIAMCINOLONE ACETONIDE 80 MG: 40 INJECTION, SUSPENSION INTRA-ARTICULAR; INTRAMUSCULAR at 10:47

## 2019-10-11 RX ORDER — FINASTERIDE 5 MG/1
5 TABLET, FILM COATED ORAL DAILY
Qty: 90 TABLET | Refills: 3 | Status: SHIPPED | OUTPATIENT
Start: 2019-10-11 | End: 2020-04-02 | Stop reason: SDUPTHER

## 2019-10-31 ENCOUNTER — NURSE ONLY (OUTPATIENT)
Dept: FAMILY MEDICINE CLINIC | Age: 79
End: 2019-10-31
Payer: MEDICARE

## 2019-10-31 DIAGNOSIS — Z23 NEED FOR PROPHYLACTIC VACCINATION AND INOCULATION AGAINST INFLUENZA: Primary | ICD-10-CM

## 2019-10-31 PROCEDURE — G0008 ADMIN INFLUENZA VIRUS VAC: HCPCS | Performed by: FAMILY MEDICINE

## 2019-10-31 PROCEDURE — 90653 IIV ADJUVANT VACCINE IM: CPT | Performed by: FAMILY MEDICINE

## 2020-01-25 ENCOUNTER — HOSPITAL ENCOUNTER (EMERGENCY)
Age: 80
Discharge: HOME | End: 2020-01-25
Payer: MEDICARE

## 2020-01-25 VITALS
RESPIRATION RATE: 20 BRPM | TEMPERATURE: 98.9 F | WEIGHT: 185 LBS | SYSTOLIC BLOOD PRESSURE: 164 MMHG | HEIGHT: 73 IN | DIASTOLIC BLOOD PRESSURE: 70 MMHG | BODY MASS INDEX: 24.52 KG/M2 | HEART RATE: 76 BPM | OXYGEN SATURATION: 96 %

## 2020-01-25 PROCEDURE — 99213 OFFICE O/P EST LOW 20 MIN: CPT | Performed by: NURSE PRACTITIONER

## 2020-01-25 PROCEDURE — 99212 OFFICE O/P EST SF 10 MIN: CPT

## 2020-01-25 RX ORDER — AMOXICILLIN AND CLAVULANATE POTASSIUM 875; 125 MG/1; MG/1
1 TABLET, FILM COATED ORAL 2 TIMES DAILY
Qty: 20 TABLET | Refills: 0 | Status: SHIPPED | OUTPATIENT
Start: 2020-01-25 | End: 2020-02-04

## 2020-01-25 ASSESSMENT — ENCOUNTER SYMPTOMS
WHEEZING: 0
VOMITING: 0
SWOLLEN GLANDS: 0
NAUSEA: 0
SINUS PAIN: 1
COUGH: 1
RHINORRHEA: 0
SHORTNESS OF BREATH: 0
CHEST TIGHTNESS: 0
STRIDOR: 0
SINUS PRESSURE: 1
SORE THROAT: 0
SNORING: 0

## 2020-01-25 NOTE — ED PROVIDER NOTES
Negative for ear pain, rhinorrhea, sneezing and sore throat. Respiratory: Positive for cough. Negative for snoring, chest tightness, shortness of breath, wheezing and stridor. Cardiovascular: Negative for chest pain. Gastrointestinal: Negative for nausea and vomiting. Skin: Negative for rash. Neurological: Negative for dizziness, vertigo, weakness, light-headedness, numbness and headaches. PAST MEDICAL HISTORY         Diagnosis Date    Arthritis     Atrial fibrillation ([address]     CAD (coronary artery disease)     CHF [facility]congestive [facility] failure) ([facility]     Hyperlipidemia     Hypertension        SURGICALHISTORY     Patient  has a past surgical history that includes Pacemaker insertion (2008); Bunionectomy (Left, 1990's); Pilonidal cyst drainage (1970); hernia repair (Right, 1960's); Cardiac catheterization; Cataract removal with implant (Left, 2015); Colonoscopy; Colonoscopy (2016); and Upper gastrointestinal endoscopy (2016).     CURRENT MEDICATIONS       Discharge Medication List as of 1/25/2020  3:16 PM      CONTINUE these medications which have NOT CHANGED    Details   finasteride (PROSCAR) 5 MG tablet Take 1 tablet by mouth daily, Disp-90 tablet, R-3Normal      rosuvastatin (CRESTOR) 5 MG tablet Take 1 tablet by mouth nightly, Disp-90 tablet, R-1Normal      terazosin (HYTRIN) 5 MG capsule Take 1 capsule by mouth nightly, Disp-90 capsule, R-1Normal      nizatidine (AXID) 150 MG capsule Take 1 capsule by mouth 2 times daily, Disp-180 capsule, R-1Normal      hydrochlorothiazide (HYDRODIURIL) 25 MG tablet Take 1 tablet by mouth daily, Disp-90 tablet, R-1Normal      baclofen (LIORESAL) 10 MG tablet Take 1 tablet by mouth 2 times daily as needed (muscle spasm), Disp-180 tablet, R-1Normal      Omega-3 Fatty Acids (FISH OIL) 1000 MG CAPS Take 1,200 mg by mouth 3 times dailyHistorical Med      mesalamine (ASACOL HD) 800 MG TBEC TBEC tablet Take 800 mg by mouth daily Historical Med      captopril following:    Height as of this encounter: 6' 1\" (1.854 m). Weight as of this encounter: 185 lb (83.9 kg). ,No LMP for male patient. Physical Exam  Constitutional:       General: He is not in acute distress. Appearance: Normal appearance. He is not ill-appearing, toxic-appearing or diaphoretic. HENT:      Nose: Nose normal.      Mouth/Throat:      Mouth: Mucous membranes are moist.   Cardiovascular:      Rate and Rhythm: Normal rate. Pulses: Normal pulses. Heart sounds: Normal heart sounds. No murmur. No friction rub. No gallop. Pulmonary:      Effort: Pulmonary effort is normal. No respiratory distress. Breath sounds: Normal breath sounds. No stridor. No wheezing, rhonchi or rales. Chest:      Chest wall: No tenderness. Musculoskeletal: Normal range of motion. Skin:     General: Skin is warm. Capillary Refill: Capillary refill takes less than 2 seconds. Findings: No erythema or rash. Neurological:      General: No focal deficit present. Mental Status: He is alert and oriented to person, place, and time. Sensory: No sensory deficit. Psychiatric:         Mood and Affect: Mood normal.         Behavior: Behavior normal.         Thought Content: Thought content normal.         Judgment: Judgment normal.         DIAGNOSTIC RESULTS     Labs:No results found for this visit on 01/25/20. IMAGING:    No orders to display     URGENT CARE COURSE:     Vitals:    01/25/20 1444 01/25/20 1526   BP:  (!) 164/70   Pulse:  76   Resp: 20 20   Temp:  98.9 °F (37.2 °C)   TempSrc:  Temporal   SpO2:  96%   Weight: 185 lb (83.9 kg)    Height: 6' 1\" (1.854 m)        Medications - No data to display         PROCEDURES:  None    FINAL IMPRESSION      1. Nasal congestion    2. Acute sinusitis, recurrence not specified, unspecified location    3. Cough          DISPOSITION/ PLAN   Is discharged home with prescription for Augmentin as well as Coricidin HBP.   Discussed with patient that

## 2020-01-25 NOTE — ED TRIAGE NOTES
Pt to urgent care due to his left ear feeling full. Pt also states he has some sinus congestion. New onset of symptoms started roughly 3 days ago.

## 2020-03-03 ENCOUNTER — TELEPHONE (OUTPATIENT)
Dept: FAMILY MEDICINE CLINIC | Age: 80
End: 2020-03-03

## 2020-03-05 NOTE — TELEPHONE ENCOUNTER
Arabella Mcallister notified of labs needed and instructed on fasting. Will stop in office in the next few days to have done.

## 2020-03-06 ENCOUNTER — NURSE ONLY (OUTPATIENT)
Dept: FAMILY MEDICINE CLINIC | Age: 80
End: 2020-03-06
Payer: MEDICARE

## 2020-03-06 LAB
ALBUMIN SERPL-MCNC: 5.1 G/DL (ref 3.5–5.1)
ALP BLD-CCNC: 65 U/L (ref 38–126)
ALT SERPL-CCNC: 7 U/L (ref 11–66)
ANION GAP SERPL CALCULATED.3IONS-SCNC: 15 MEQ/L (ref 8–16)
AST SERPL-CCNC: 18 U/L (ref 5–40)
BILIRUB SERPL-MCNC: 0.7 MG/DL (ref 0.3–1.2)
BUN BLDV-MCNC: 16 MG/DL (ref 7–22)
CALCIUM SERPL-MCNC: 10.3 MG/DL (ref 8.5–10.5)
CHLORIDE BLD-SCNC: 98 MEQ/L (ref 98–111)
CHOLESTEROL, TOTAL: 153 MG/DL (ref 100–199)
CO2: 27 MEQ/L (ref 23–33)
CREAT SERPL-MCNC: 0.6 MG/DL (ref 0.4–1.2)
ERYTHROCYTE [DISTWIDTH] IN BLOOD BY AUTOMATED COUNT: 13.8 % (ref 11.5–14.5)
ERYTHROCYTE [DISTWIDTH] IN BLOOD BY AUTOMATED COUNT: 48.3 FL (ref 35–45)
GFR SERPL CREATININE-BSD FRML MDRD: > 90 ML/MIN/1.73M2
GLUCOSE BLD-MCNC: 109 MG/DL (ref 70–108)
HCT VFR BLD CALC: 44.8 % (ref 42–52)
HDLC SERPL-MCNC: 51 MG/DL
HEMOGLOBIN: 14.6 GM/DL (ref 14–18)
LDL CHOLESTEROL CALCULATED: 83 MG/DL
MCH RBC QN AUTO: 30.9 PG (ref 26–33)
MCHC RBC AUTO-ENTMCNC: 32.6 GM/DL (ref 32.2–35.5)
MCV RBC AUTO: 94.7 FL (ref 80–94)
PLATELET # BLD: 276 THOU/MM3 (ref 130–400)
PMV BLD AUTO: 10.2 FL (ref 9.4–12.4)
POTASSIUM SERPL-SCNC: 4.4 MEQ/L (ref 3.5–5.2)
RBC # BLD: 4.73 MILL/MM3 (ref 4.7–6.1)
SODIUM BLD-SCNC: 140 MEQ/L (ref 135–145)
TOTAL PROTEIN: 8.2 G/DL (ref 6.1–8)
TRIGL SERPL-MCNC: 97 MG/DL (ref 0–199)
WBC # BLD: 7.3 THOU/MM3 (ref 4.8–10.8)

## 2020-03-06 PROCEDURE — 36415 COLL VENOUS BLD VENIPUNCTURE: CPT | Performed by: FAMILY MEDICINE

## 2020-03-16 RX ORDER — NIZATIDINE 150 MG/1
150 CAPSULE ORAL 2 TIMES DAILY
Qty: 180 CAPSULE | Refills: 1 | Status: SHIPPED | OUTPATIENT
Start: 2020-03-16 | End: 2020-03-27 | Stop reason: SDUPTHER

## 2020-03-16 RX ORDER — ROSUVASTATIN CALCIUM 5 MG/1
5 TABLET, COATED ORAL NIGHTLY
Qty: 90 TABLET | Refills: 1 | Status: SHIPPED | OUTPATIENT
Start: 2020-03-16 | End: 2020-07-20 | Stop reason: SDUPTHER

## 2020-03-16 RX ORDER — TERAZOSIN 5 MG/1
5 CAPSULE ORAL NIGHTLY
Qty: 90 CAPSULE | Refills: 1 | Status: SHIPPED | OUTPATIENT
Start: 2020-03-16 | End: 2020-07-20 | Stop reason: SDUPTHER

## 2020-03-16 RX ORDER — HYDROCHLOROTHIAZIDE 25 MG/1
25 TABLET ORAL DAILY
Qty: 90 TABLET | Refills: 1 | Status: SHIPPED | OUTPATIENT
Start: 2020-03-16 | End: 2020-07-20 | Stop reason: SDUPTHER

## 2020-03-16 RX ORDER — BACLOFEN 10 MG/1
10 TABLET ORAL 2 TIMES DAILY PRN
Qty: 180 TABLET | Refills: 1 | Status: SHIPPED | OUTPATIENT
Start: 2020-03-16 | End: 2020-07-20 | Stop reason: SDUPTHER

## 2020-03-16 NOTE — TELEPHONE ENCOUNTER
[facility] called requesting a refill on the following medications:  Requested Prescriptions     Pending Prescriptions Disp Refills    baclofen (LIORESAL) 10 MG tablet 180 tablet 1     Sig: Take 1 tablet by mouth 2 times daily as needed (muscle spasm)    hydroCHLOROthiazide (HYDRODIURIL) 25 MG tablet 90 tablet 1     Sig: Take 1 tablet by mouth daily    nizatidine (AXID) 150 MG capsule 180 capsule 1     Sig: Take 1 capsule by mouth 2 times daily    terazosin (HYTRIN) 5 MG capsule 90 capsule 1     Sig: Take 1 capsule by mouth nightly    rosuvastatin (CRESTOR) 5 MG tablet 90 tablet 1     Sig: Take 1 tablet by mouth nightly       Date of last visit: 10/3/2019  Date of next visit (if applicable):3/16/2020  Date of last refill:   Pharmacy Name:       Mario Dorsey, [address] [place] [address]

## 2020-03-27 ENCOUNTER — TELEPHONE (OUTPATIENT)
Dept: FAMILY MEDICINE CLINIC | Age: 80
End: 2020-03-27

## 2020-03-27 RX ORDER — NIZATIDINE 150 MG/1
150 CAPSULE ORAL 2 TIMES DAILY
Qty: 180 CAPSULE | Refills: 1 | Status: SHIPPED | OUTPATIENT
Start: 2020-03-27 | End: 2021-02-16

## 2020-03-27 RX ORDER — FAMOTIDINE 20 MG/1
20 TABLET, FILM COATED ORAL 2 TIMES DAILY
Qty: 180 TABLET | Refills: 1 | Status: SHIPPED | OUTPATIENT
Start: 2020-03-27 | End: 2020-07-20 | Stop reason: SDUPTHER

## 2020-03-27 NOTE — TELEPHONE ENCOUNTER
Patient states his Nizatidine is factory back ordered.  He does not know if there is something else he could be taking instead of this

## 2020-04-02 ENCOUNTER — VIRTUAL VISIT (OUTPATIENT)
Dept: UROLOGY | Age: 80
End: 2020-04-02
Payer: MEDICARE

## 2020-04-02 PROCEDURE — 99213 OFFICE O/P EST LOW 20 MIN: CPT | Performed by: NURSE PRACTITIONER

## 2020-04-02 RX ORDER — FINASTERIDE 5 MG/1
5 TABLET, FILM COATED ORAL DAILY
Qty: 90 TABLET | Refills: 3 | Status: SHIPPED | OUTPATIENT
Start: 2020-04-02 | End: 2021-04-13 | Stop reason: SDUPTHER

## 2020-04-02 ASSESSMENT — ENCOUNTER SYMPTOMS
SHORTNESS OF BREATH: 0
CHEST TIGHTNESS: 0
EYE PAIN: 0
NAUSEA: 0
EYE ITCHING: 0
COLOR CHANGE: 0
DIARRHEA: 0
BACK PAIN: 0

## 2020-04-04 NOTE — PROGRESS NOTES
2020    TELEHEALTH EVALUATION -- Audio/Visual (During RZCAL-86 public health emergency)    HPI:    Dustin Celaya (:  1940) has requested an audio/video evaluation for the following concern(s):    BPH with LUTS    Former pt of [employer] CNP  Continues on Hytrin and Finasteride  Reports urinary freq, nocturia well controlled  Denies any pain, gross hematuria, fever, chills  Last PSA in 2018 was 1.95. Review of Systems    Prior to Visit Medications    Medication Sig Taking? Authorizing Provider   finasteride (PROSCAR) 5 MG tablet Take 1 tablet by mouth daily Yes Omaira Read APRN - CNP   nizatidine (AXID) 150 MG capsule Take 1 capsule by mouth 2 times daily Yes Karely Benito MD   famotidine (PEPCID) 20 MG tablet Take 1 tablet by mouth 2 times daily Yes Karely Benito MD   baclofen (LIORESAL) 10 MG tablet Take 1 tablet by mouth 2 times daily as needed (muscle spasm) Yes Karely Benito MD   hydroCHLOROthiazide (HYDRODIURIL) 25 MG tablet Take 1 tablet by mouth daily Yes Karely Benito MD   terazosin (HYTRIN) 5 MG capsule Take 1 capsule by mouth nightly Yes Karely Benito MD   rosuvastatin (CRESTOR) 5 MG tablet Take 1 tablet by mouth nightly Yes Karely Benito MD   mesalamine (ASACOL HD) 800 MG TBEC TBEC tablet Take 800 mg by mouth daily  Yes Historical Provider, MD   captopril (CAPOTEN) 25 MG tablet Take 25 mg by mouth 2 times daily Yes Historical Provider, MD   carvedilol (COREG) 25 MG tablet Take 25 mg by mouth 2 times daily (with meals) Yes Historical Provider, MD   clopidogrel (PLAVIX) 75 MG tablet Take 1 tablet by mouth daily. Patient taking differently: Take 75 mg by mouth nightly  Yes Pebbles Blue MD   digoxin (LANOXIN) 125 MCG tablet Take 1 tablet by mouth daily. Yes Lacho Duarte MD   gemfibrozil (LOPID) 600 MG tablet Take 1 tablet by mouth 2 times daily (before meals).  Yes Lacho Duarte MD   Coenzyme Q10 (COQ10) 200 MG CAPS Take 200 mg/day by mouth 2 times daily Yes Kia Chew MD   Saw Palmetto, Serenoa repens, 450 MG CAPS Take 425 mg/day by mouth daily. Yes Kia Chew MD       Social History     Tobacco Use    Smoking status: Former Smoker     Packs/day: 0.50     Years: 15.00     Pack years: 7.50     Last attempt to quit: 1965     Years since quittin.2    Smokeless tobacco: Never Used   Substance Use Topics    Alcohol use: No    Drug use: No            PHYSICAL EXAMINATION:  [ INSTRUCTIONS:  \"[x]\" Indicates a positive item  \"[]\" Indicates a negative item  -- DELETE ALL ITEMS NOT EXAMINED]  Vital Signs: (As obtained by patient/caregiver or practitioner observation)    Blood pressure-  Heart rate-    Respiratory rate-    Temperature-  Pulse oximetry-     Constitutional: [x] Appears well-developed and well-nourished [x] No apparent distress      [] Abnormal-   Mental status  [x] Alert and awake  [x] Oriented to person/place/time [x]Able to follow commands      Eyes:  EOM    []  Normal  [] Abnormal-  Sclera  []  Normal  [] Abnormal -         Discharge []  None visible  [] Abnormal -    HENT:   [x] Normocephalic, atraumatic.   [] Abnormal   [] Mouth/Throat: Mucous membranes are moist.     External Ears [] Normal  [] Abnormal-     Neck: [x] No visualized mass     Pulmonary/Chest: [x] Respiratory effort normal.  [x] No visualized signs of difficulty breathing or respiratory distress        [] Abnormal-      Musculoskeletal:   [] Normal gait with no signs of ataxia         [] Normal range of motion of neck        [] Abnormal-       Neurological:        [x] No Facial Asymmetry (Cranial nerve 7 motor function) (limited exam to video visit)          [] No gaze palsy        [] Abnormal-         Skin:        [x] No significant exanthematous lesions or discoloration noted on facial skin         [] Abnormal-            Psychiatric:       [x] Normal Affect [] No Hallucinations        [] Abnormal-     Other pertinent observable physical exam findings- ASSESSMENT/PLAN:  There are no diagnoses linked to this encounter. BPH with LUTS    He's happy with symptom control. Continue Finasteride and Hytrin. Finasteride refill sent. Will plan to follow up in 1 year for recheck. Encouraged him to call me sooner with any questions or concerns. Return in about 1 year (around 4/2/2021). Narinder Galeas is a [de-identified] y.o. male being evaluated by a Virtual Visit (video visit) encounter to address concerns as mentioned above. A caregiver was present when appropriate. Due to this being a TeleHealth encounter (During CBUYJ-41 public health emergency), evaluation of the following organ systems was limited: Vitals/Constitutional/EENT/Resp/CV/GI//MS/Neuro/Skin/Heme-Lymph-Imm. Pursuant to the emergency declaration under the [address] [place] [address], [address] [place] [address] authority and the [employer] Act, this Virtual Visit was conducted with patient's (and/or legal guardian's) consent, to reduce the patient's risk of exposure to COVID-19 and provide necessary medical care. The patient (and/or legal guardian) has also been advised to contact this office for worsening conditions or problems, and seek emergency medical treatment and/or call 911 if deemed necessary. Services were provided through a video synchronous discussion virtually to substitute for in-person clinic visit. Patient and provider were located at their individual homes. --SID Aldridge - CNP on 4/4/2020 at 12:40 PM    An electronic signature was used to authenticate this note.

## 2020-06-17 ENCOUNTER — OFFICE VISIT (OUTPATIENT)
Dept: FAMILY MEDICINE CLINIC | Age: 80
End: 2020-06-17
Payer: MEDICARE

## 2020-06-17 VITALS
SYSTOLIC BLOOD PRESSURE: 124 MMHG | WEIGHT: 192.8 LBS | HEIGHT: 73 IN | HEART RATE: 64 BPM | DIASTOLIC BLOOD PRESSURE: 78 MMHG | BODY MASS INDEX: 25.55 KG/M2 | TEMPERATURE: 97.6 F

## 2020-06-17 PROCEDURE — 99213 OFFICE O/P EST LOW 20 MIN: CPT | Performed by: FAMILY MEDICINE

## 2020-06-17 SDOH — ECONOMIC STABILITY: FOOD INSECURITY: WITHIN THE PAST 12 MONTHS, YOU WORRIED THAT YOUR FOOD WOULD RUN OUT BEFORE YOU GOT MONEY TO BUY MORE.: NEVER TRUE

## 2020-06-17 SDOH — ECONOMIC STABILITY: FOOD INSECURITY: WITHIN THE PAST 12 MONTHS, THE FOOD YOU BOUGHT JUST DIDN'T LAST AND YOU DIDN'T HAVE MONEY TO GET MORE.: NEVER TRUE

## 2020-06-17 SDOH — ECONOMIC STABILITY: TRANSPORTATION INSECURITY
IN THE PAST 12 MONTHS, HAS LACK OF TRANSPORTATION KEPT YOU FROM MEETINGS, WORK, OR FROM GETTING THINGS NEEDED FOR DAILY LIVING?: NO

## 2020-06-17 SDOH — ECONOMIC STABILITY: INCOME INSECURITY: HOW HARD IS IT FOR YOU TO PAY FOR THE VERY BASICS LIKE FOOD, HOUSING, MEDICAL CARE, AND HEATING?: NOT HARD AT ALL

## 2020-06-17 SDOH — ECONOMIC STABILITY: TRANSPORTATION INSECURITY
IN THE PAST 12 MONTHS, HAS THE LACK OF TRANSPORTATION KEPT YOU FROM MEDICAL APPOINTMENTS OR FROM GETTING MEDICATIONS?: NO

## 2020-06-17 ASSESSMENT — PATIENT HEALTH QUESTIONNAIRE - PHQ9
2. FEELING DOWN, DEPRESSED OR HOPELESS: 0
1. LITTLE INTEREST OR PLEASURE IN DOING THINGS: 0
SUM OF ALL RESPONSES TO PHQ QUESTIONS 1-9: 0
SUM OF ALL RESPONSES TO PHQ9 QUESTIONS 1 & 2: 0
SUM OF ALL RESPONSES TO PHQ QUESTIONS 1-9: 0

## 2020-06-17 NOTE — PROGRESS NOTES
to contact dermatitis from Voltaren gel, we will hold on steroid injection today and bring him back in 1 week to do the injection so as to minimize risk of infection to the knee. -Recommend ice  -Tylenol as needed  -Restart home exercise program when he feels better    2. H/O calcium pyrophosphate deposition disease (CPPD)    3. Contact dermatitis due to drugs in contact with skin, unspecified contact dermatitis type  New problem due to Voltaren gel. Do not use voltaren gel (he stopped 4 days ago). Chronic right knee pain due to CPPD. Worsening symptoms.    -Tylenol as needed  -Ice    Return for steroid injection in about 1 week if rash is resolved. They voiced understanding. All questions answered. They agreed with treatment plan. See patient instructions for any educational materials that may have been given. Discussed use, benefit, and side effects of prescribed medications. Reviewed health maintenance. (Please note that portions of this note may have been completed with a voice recognition program.  Efforts were made to edit the dictation but occasionally words are mis-transcribed.)    Return in about 8 days (around 6/25/2020) for right knee injection.       Electronically signed by Roby Pate MD on 6/17/2020 at 10:41 AM

## 2020-06-26 ENCOUNTER — PROCEDURE VISIT (OUTPATIENT)
Dept: FAMILY MEDICINE CLINIC | Age: 80
End: 2020-06-26
Payer: MEDICARE

## 2020-06-26 VITALS
HEIGHT: 73 IN | BODY MASS INDEX: 25.71 KG/M2 | TEMPERATURE: 97.7 F | WEIGHT: 194 LBS | HEART RATE: 60 BPM | SYSTOLIC BLOOD PRESSURE: 132 MMHG | DIASTOLIC BLOOD PRESSURE: 70 MMHG

## 2020-06-26 PROCEDURE — 20610 DRAIN/INJ JOINT/BURSA W/O US: CPT | Performed by: FAMILY MEDICINE

## 2020-06-26 RX ORDER — BUPIVACAINE HYDROCHLORIDE 5 MG/ML
4 INJECTION, SOLUTION PERINEURAL ONCE
Status: COMPLETED | OUTPATIENT
Start: 2020-06-26 | End: 2020-06-26

## 2020-06-26 RX ORDER — LIDOCAINE HYDROCHLORIDE 10 MG/ML
4 INJECTION, SOLUTION INFILTRATION; PERINEURAL ONCE
Status: COMPLETED | OUTPATIENT
Start: 2020-06-26 | End: 2020-06-26

## 2020-06-26 RX ORDER — TRIAMCINOLONE ACETONIDE 40 MG/ML
80 INJECTION, SUSPENSION INTRA-ARTICULAR; INTRAMUSCULAR ONCE
Status: COMPLETED | OUTPATIENT
Start: 2020-06-26 | End: 2020-06-26

## 2020-06-26 RX ADMIN — BUPIVACAINE HYDROCHLORIDE 20 MG: 5 INJECTION, SOLUTION PERINEURAL at 10:10

## 2020-06-26 RX ADMIN — TRIAMCINOLONE ACETONIDE 80 MG: 40 INJECTION, SUSPENSION INTRA-ARTICULAR; INTRAMUSCULAR at 10:12

## 2020-06-26 RX ADMIN — LIDOCAINE HYDROCHLORIDE 4 ML: 10 INJECTION, SOLUTION INFILTRATION; PERINEURAL at 10:11

## 2020-07-03 ENCOUNTER — HOSPITAL ENCOUNTER (OUTPATIENT)
Age: 80
Discharge: HOME | End: 2020-07-03
Payer: MEDICARE

## 2020-07-03 LAB
ALBUMIN SERPL-MCNC: 4.5 G/DL (ref 3.5–5.1)
ALP BLD-CCNC: 60 U/L (ref 38–126)
ALT SERPL-CCNC: 6 U/L (ref 11–66)
ANION GAP SERPL CALCULATED.3IONS-SCNC: 13 MEQ/L (ref 8–16)
AST SERPL-CCNC: 14 U/L (ref 5–40)
BILIRUB SERPL-MCNC: 0.6 MG/DL (ref 0.3–1.2)
BILIRUBIN DIRECT: < 0.2 MG/DL (ref 0–0.3)
BUN BLDV-MCNC: 23 MG/DL (ref 7–22)
CALCIUM SERPL-MCNC: 9.9 MG/DL (ref 8.5–10.5)
CHLORIDE BLD-SCNC: 97 MEQ/L (ref 98–111)
CHOLESTEROL, TOTAL: 144 MG/DL (ref 100–199)
CO2: 27 MEQ/L (ref 23–33)
CREAT SERPL-MCNC: 0.7 MG/DL (ref 0.4–1.2)
ERYTHROCYTE [DISTWIDTH] IN BLOOD BY AUTOMATED COUNT: 14.4 % (ref 11.5–14.5)
ERYTHROCYTE [DISTWIDTH] IN BLOOD BY AUTOMATED COUNT: 51.3 FL (ref 35–45)
GFR SERPL CREATININE-BSD FRML MDRD: > 90 ML/MIN/1.73M2
GLUCOSE BLD-MCNC: 108 MG/DL (ref 70–108)
HCT VFR BLD CALC: 46.9 % (ref 42–52)
HDLC SERPL-MCNC: 55 MG/DL
HEMOGLOBIN: 14.9 GM/DL (ref 14–18)
LDL CHOLESTEROL CALCULATED: 68 MG/DL
MCH RBC QN AUTO: 30.8 PG (ref 26–33)
MCHC RBC AUTO-ENTMCNC: 31.8 GM/DL (ref 32.2–35.5)
MCV RBC AUTO: 97.1 FL (ref 80–94)
PLATELET # BLD: 314 THOU/MM3 (ref 130–400)
PMV BLD AUTO: 10.2 FL (ref 9.4–12.4)
POTASSIUM SERPL-SCNC: 4.6 MEQ/L (ref 3.5–5.2)
RBC # BLD: 4.83 MILL/MM3 (ref 4.7–6.1)
SODIUM BLD-SCNC: 137 MEQ/L (ref 135–145)
TOTAL PROTEIN: 7.4 G/DL (ref 6.1–8)
TRIGL SERPL-MCNC: 107 MG/DL (ref 0–199)
WBC # BLD: 10.9 THOU/MM3 (ref 4.8–10.8)

## 2020-07-03 PROCEDURE — 85027 COMPLETE CBC AUTOMATED: CPT

## 2020-07-03 PROCEDURE — 80053 COMPREHEN METABOLIC PANEL: CPT

## 2020-07-03 PROCEDURE — 80061 LIPID PANEL: CPT

## 2020-07-03 PROCEDURE — 36415 COLL VENOUS BLD VENIPUNCTURE: CPT

## 2020-07-03 PROCEDURE — 82248 BILIRUBIN DIRECT: CPT

## 2020-07-20 ENCOUNTER — OFFICE VISIT (OUTPATIENT)
Dept: FAMILY MEDICINE CLINIC | Age: 80
End: 2020-07-20
Payer: MEDICARE

## 2020-07-20 VITALS
DIASTOLIC BLOOD PRESSURE: 70 MMHG | TEMPERATURE: 97.9 F | HEART RATE: 64 BPM | SYSTOLIC BLOOD PRESSURE: 122 MMHG | WEIGHT: 196.6 LBS | BODY MASS INDEX: 26.06 KG/M2 | HEIGHT: 73 IN

## 2020-07-20 PROCEDURE — 99214 OFFICE O/P EST MOD 30 MIN: CPT | Performed by: FAMILY MEDICINE

## 2020-07-20 RX ORDER — FAMOTIDINE 20 MG/1
20 TABLET, FILM COATED ORAL 2 TIMES DAILY
Qty: 180 TABLET | Refills: 1 | Status: SHIPPED | OUTPATIENT
Start: 2020-07-20 | End: 2020-09-14 | Stop reason: SDUPTHER

## 2020-07-20 RX ORDER — TERAZOSIN 5 MG/1
5 CAPSULE ORAL NIGHTLY
Qty: 90 CAPSULE | Refills: 1 | Status: SHIPPED | OUTPATIENT
Start: 2020-07-20 | End: 2021-02-16 | Stop reason: SDUPTHER

## 2020-07-20 RX ORDER — ROSUVASTATIN CALCIUM 5 MG/1
5 TABLET, COATED ORAL NIGHTLY
Qty: 90 TABLET | Refills: 1 | Status: SHIPPED | OUTPATIENT
Start: 2020-07-20 | End: 2021-02-16 | Stop reason: SDUPTHER

## 2020-07-20 RX ORDER — BACLOFEN 10 MG/1
10 TABLET ORAL 2 TIMES DAILY PRN
Qty: 180 TABLET | Refills: 1 | Status: SHIPPED | OUTPATIENT
Start: 2020-07-20 | End: 2021-02-16 | Stop reason: SDUPTHER

## 2020-07-20 RX ORDER — HYDROCHLOROTHIAZIDE 25 MG/1
25 TABLET ORAL DAILY
Qty: 90 TABLET | Refills: 1 | Status: SHIPPED | OUTPATIENT
Start: 2020-07-20 | End: 2021-02-16 | Stop reason: SDUPTHER

## 2020-07-20 ASSESSMENT — ENCOUNTER SYMPTOMS
BLOOD IN STOOL: 0
ABDOMINAL PAIN: 0
WHEEZING: 0
SHORTNESS OF BREATH: 0

## 2020-07-20 NOTE — PROGRESS NOTES
[facility]  [facility] MEDICINE  [address] [facility]  Dept: [phone]  Dept Fax: [phone]  Loc: [phone]  PROGRESS NOTE      Visit Date: 7/20/2020    Marin Worthington is a [de-identified] y.o. male who presents today for:  Chief Complaint   Patient presents with    6 Month Follow-Up    Hypertension       Subjective:  HPI       9 month f/u     HTN:  On hctz, hytrin, coreg, and captopril. Has A. Fib, CAD, and chronic systolic CHF. Has pacemaker. On plavix. Home BPs  120s/70s. Gastritis. On axid and pepcid. No abd pain or nausea. Hypercholesterolemia:  On crestor and lopid. No myalgias. He is also on fish oil. He is on co Q 10. BPH:  On hytrin and proscar. Sees urology. muscle spasms: takes baclofen prn    He rides a stationary bike sometimes but his right knee gets irritated with doing so. No concerns    Review of Systems   Respiratory: Negative for shortness of breath and wheezing. Cardiovascular: Negative for chest pain and leg swelling. Gastrointestinal: Negative for abdominal pain and blood in stool. Musculoskeletal: Positive for arthralgias. Neurological: Negative for dizziness and syncope.      Patient Active Problem List   Diagnosis    Atrial fibrillation ([address]    ASHD (arteriosclerotic heart disease)    CHF (congestive heart failure) ([address]    Cholelithiasis    Gluten enteropathy    Pacemaker battery depletion    Complete heart block ([address]    Benign non-nodular prostatic hyperplasia without lower urinary tract symptoms    Lumbar degenerative disc disease    Kidney stone on right side    Right inguinal hernia    Diverticulosis of large intestine without hemorrhage    Essential hypertension    Personal history of calcium pyrophosphate deposition disease (CPPD)    Localized osteoarthritis of right knee     Past Medical History:   Diagnosis Date    Arthritis     Atrial fibrillation ([address]     CAD (coronary artery disease)     CHF (congestive heart failure) (HCC)     Hyperlipidemia     Hypertension       Past Surgical History:   Procedure Laterality Date    BUNIONECTOMY Left 82U   Ross Navarro Left     Dr. Sebastien Edouard      x2 Dr. Paul Garza      Dr. Liborio Mcdermott Right     inguinal   Nancy Jaz      Dr. Cheryl Morrison      Dr. Ghazala Cottrell History   Problem Relation Age of Onset    Kidney Disease Mother     Cancer Father      Social History     Tobacco Use    Smoking status: Former Smoker     Packs/day: 0.50     Years: 15.00     Pack years: 7.50     Last attempt to quit: 1965     Years since quittin.5    Smokeless tobacco: Never Used   Substance Use Topics    Alcohol use: No      Current Outpatient Medications   Medication Sig Dispense Refill    finasteride (PROSCAR) 5 MG tablet Take 1 tablet by mouth daily 90 tablet 3    nizatidine (AXID) 150 MG capsule Take 1 capsule by mouth 2 times daily 180 capsule 1    famotidine (PEPCID) 20 MG tablet Take 1 tablet by mouth 2 times daily 180 tablet 1    baclofen (LIORESAL) 10 MG tablet Take 1 tablet by mouth 2 times daily as needed (muscle spasm) 180 tablet 1    hydroCHLOROthiazide (HYDRODIURIL) 25 MG tablet Take 1 tablet by mouth daily 90 tablet 1    terazosin (HYTRIN) 5 MG capsule Take 1 capsule by mouth nightly 90 capsule 1    rosuvastatin (CRESTOR) 5 MG tablet Take 1 tablet by mouth nightly 90 tablet 1    mesalamine (ASACOL HD) 800 MG TBEC TBEC tablet Take 800 mg by mouth daily       captopril (CAPOTEN) 25 MG tablet Take 25 mg by mouth 2 times daily      carvedilol (COREG) 25 MG tablet Take 25 mg by mouth 2 times daily (with meals)      clopidogrel (PLAVIX) 75 MG tablet Take 1 tablet by mouth daily.  (Patient taking differently: Take 75 mg by mouth nightly ) 90 tablet 1    digoxin (LANOXIN) 125 MCG tablet Take 1 tablet by mouth daily. 90 tablet 1    gemfibrozil (LOPID) 600 MG tablet Take 1 tablet by mouth 2 times daily (before meals). 180 tablet 1    Coenzyme Q10 (COQ10) 200 MG CAPS Take 200 mg/day by mouth 2 times daily  180 capsule 1    Saw Palmetto, Serenoa repens, 450 MG CAPS Take 425 mg/day by mouth daily. 90 capsule 1     No current facility-administered medications for this visit. Allergies   Allergen Reactions    Latex Rash    Gluten Meal     Vasotec [Enalaprilat] Other (See Comments)     cough    Neosporin [Neomycin-Polymyxin-Gramicidin] Rash    Sulfa Antibiotics Rash    Voltaren [Diclofenac Sodium] Rash     gel     Health Maintenance   Topic Date Due    Shingles Vaccine (1 of 2) 10/06/1989    Flu vaccine (1) 09/01/2020    Annual Wellness Visit (AWV)  09/16/2020    Lipid screen  07/03/2021    Potassium monitoring  07/03/2021    Creatinine monitoring  07/03/2021    DTaP/Tdap/Td vaccine (2 - Td) 06/09/2027    Pneumococcal 65+ years Vaccine  Completed    Hepatitis A vaccine  Aged Out    Hepatitis B vaccine  Aged Out    Hib vaccine  Aged Out    Meningococcal (ACWY) vaccine  Aged Out       Objective:  /70 (Site: Left Upper Arm, Position: Sitting, Cuff Size: Medium Adult)   Pulse 64   Temp 97.9 °F (36.6 °C)   Ht 6' 1\" (1.854 m)   Wt 196 lb 9.6 oz (89.2 kg)   BMI 25.94 kg/m²   Physical Exam  Vitals signs reviewed. Constitutional:       General: He is not in acute distress. Appearance: He is well-developed. Cardiovascular:      Rate and Rhythm: Normal rate and regular rhythm. Heart sounds: No murmur. Pulmonary:      Effort: Pulmonary effort is normal. No respiratory distress. Breath sounds: Normal breath sounds. No wheezing. Musculoskeletal:      Right lower leg: No edema. Left lower leg: No edema. Neurological:      Mental Status: He is alert. Mental status is at baseline. Psychiatric:         Mood and Affect: Mood normal.         Behavior: Behavior normal.         Lab Results   Component Value Date    WBC 10.9 (H) 07/03/2020    HGB 14.9 07/03/2020    HCT 46.9 07/03/2020     07/03/2020    CHOL 144 07/03/2020    TRIG 107 07/03/2020    HDL 55 07/03/2020    ALT 6 (L) 07/03/2020    AST 14 07/03/2020     07/03/2020    K 4.6 07/03/2020    CL 97 (L) 07/03/2020    CREATININE 0.7 07/03/2020    BUN 23 (H) 07/03/2020    CO2 27 07/03/2020    PSA 1.95 (H) 04/17/2018    INR 1.10 04/17/2018         Impression/Plan:  1. Essential hypertension  Chronic. Well-controlled. Continue Hytrin, captopril, Coreg, and hydrochlorothiazide  - terazosin (HYTRIN) 5 MG capsule; Take 1 capsule by mouth nightly  Dispense: 90 capsule; Refill: 1    2. Chronic superficial gastritis without bleeding  Chronic. Well-controlled. Continue Pepcid and Axid  - famotidine (PEPCID) 20 MG tablet; Take 1 tablet by mouth 2 times daily  Dispense: 180 tablet; Refill: 1    3. ASHD (arteriosclerotic heart disease)  Chronic. Controlled. continue statin and Plavix. - hydroCHLOROthiazide (HYDRODIURIL) 25 MG tablet; Take 1 tablet by mouth daily  Dispense: 90 tablet; Refill: 1  - terazosin (HYTRIN) 5 MG capsule; Take 1 capsule by mouth nightly  Dispense: 90 capsule; Refill: 1  - rosuvastatin (CRESTOR) 5 MG tablet; Take 1 tablet by mouth nightly  Dispense: 90 tablet; Refill: 1    4. Benign non-nodular prostatic hyperplasia without lower urinary tract symptoms  Chronic. Stable. Continue Hytrin and Proscar  - terazosin (HYTRIN) 5 MG capsule; Take 1 capsule by mouth nightly  Dispense: 90 capsule; Refill: 1    5. Muscle spasm  Chronic. Stable. Intermittent symptoms. Refill med  - baclofen (LIORESAL) 10 MG tablet; Take 1 tablet by mouth 2 times daily as needed (muscle spasm)  Dispense: 180 tablet; Refill: 1      They voiced understanding. All questions answered. They agreed with treatment plan.    See patient instructions for any educational materials that may have been given. Discussed use, benefit, and side effects of prescribed medications. Reviewed health maintenance. (Please note that portions of this note may have been completed with a voice recognition program.  Efforts were made to edit the dictation but occasionally words are mis-transcribed.)    Return in about 6 months (around 1/20/2021) for medicare wellness + HTN.       Electronically signed by Ronnell Pierce MD on 7/20/2020 at 9:33 AM

## 2020-09-14 RX ORDER — FAMOTIDINE 20 MG/1
20 TABLET, FILM COATED ORAL 2 TIMES DAILY
Qty: 180 TABLET | Refills: 1 | Status: ON HOLD | OUTPATIENT
Start: 2020-09-14 | End: 2022-03-09 | Stop reason: ALTCHOICE

## 2020-09-14 NOTE — TELEPHONE ENCOUNTER
[facility]    Report of Consultation    Rosa Elena Hillman Patient Status:  Inpatient    1946 MRN [id number]   Location [facility] 2W/SW Attending Carlos Fonseca, [address] [facility] Day # 2 PCP Deanna Birch MD     Date of Admission: Patient contacted pharmacy they said they have no RX for this.  I told the patient he should have refills but insisted on this being sent in CATARACT EXTRACTION W/  INTRAOCULAR LENS IMPLANT Left 6/4/12    Dr. Kita Reddy at [facility]    • COLONOSCOPY N/A 4/4/2018    Performed by Gaby Chirinos MD at [facility] ENDOSCOPY   • ESOPHAGOGASTRODUODENOSCOPY (EGD) N/A 4/4/2018    Performed by Abilio Bhatti Glucose-Vitamin C (DEX-4) 4-6 GM-MG chewable tab 4 tablet, 4 tablet, Oral, Q15 Min PRN  •  glucose (DEX4) oral liquid 15 g, 15 g, Oral, Q15 Min PRN  •  Insulin Aspart Pen (NOVOLOG) 100 UNIT/ML flexpen 1-7 Units, 1-7 Units, Subcutaneous, TID CC  •  Atropin side     Cardiac defibrillator in place     BMI 39.0-39.9,adult     Severe obesity (BMI 35.0-39. 9) with comorbidity ([facility] [address]     Right foot pain     Acute gout due to renal impairment involving left ankle     Primary osteoarthritis involving multiple joints

## 2020-10-13 ENCOUNTER — NURSE ONLY (OUTPATIENT)
Dept: FAMILY MEDICINE CLINIC | Age: 80
End: 2020-10-13
Payer: MEDICARE

## 2020-10-13 PROCEDURE — 90694 VACC AIIV4 NO PRSRV 0.5ML IM: CPT | Performed by: FAMILY MEDICINE

## 2020-10-13 PROCEDURE — G0008 ADMIN INFLUENZA VIRUS VAC: HCPCS | Performed by: FAMILY MEDICINE

## 2021-01-12 ENCOUNTER — TELEPHONE (OUTPATIENT)
Dept: FAMILY MEDICINE CLINIC | Age: 81
End: 2021-01-12

## 2021-01-12 DIAGNOSIS — I10 ESSENTIAL HYPERTENSION: Primary | ICD-10-CM

## 2021-01-12 DIAGNOSIS — I25.10 ASHD (ARTERIOSCLEROTIC HEART DISEASE): ICD-10-CM

## 2021-01-12 NOTE — TELEPHONE ENCOUNTER
Patient phoned- has an appointment 01/22/21 and would like to know if he needs to have labs done prior to appointment. Last labs done 07/2020. It appears he has twice a year. Labs pended if Dr. Kerry Edwards feels appropriate. Call patient and let him know either way.

## 2021-01-19 ENCOUNTER — HOSPITAL ENCOUNTER (OUTPATIENT)
Age: 81
Discharge: HOME | End: 2021-01-19
Payer: MEDICARE

## 2021-01-19 DIAGNOSIS — I10 ESSENTIAL HYPERTENSION: ICD-10-CM

## 2021-01-19 DIAGNOSIS — I25.10 ASHD (ARTERIOSCLEROTIC HEART DISEASE): ICD-10-CM

## 2021-01-19 LAB
ALBUMIN SERPL-MCNC: 4.8 G/DL (ref 3.5–5.1)
ALP BLD-CCNC: 58 U/L (ref 38–126)
ALT SERPL-CCNC: 8 U/L (ref 11–66)
ANION GAP SERPL CALCULATED.3IONS-SCNC: 9 MEQ/L (ref 8–16)
AST SERPL-CCNC: 18 U/L (ref 5–40)
BASOPHILS # BLD: 1 %
BASOPHILS ABSOLUTE: 0.1 THOU/MM3 (ref 0–0.1)
BILIRUB SERPL-MCNC: 0.8 MG/DL (ref 0.3–1.2)
BUN BLDV-MCNC: 20 MG/DL (ref 7–22)
CALCIUM SERPL-MCNC: 10.7 MG/DL (ref 8.5–10.5)
CHLORIDE BLD-SCNC: 100 MEQ/L (ref 98–111)
CHOLESTEROL, TOTAL: 148 MG/DL (ref 100–199)
CO2: 28 MEQ/L (ref 23–33)
CREAT SERPL-MCNC: 0.7 MG/DL (ref 0.4–1.2)
EOSINOPHIL # BLD: 6.7 %
EOSINOPHILS ABSOLUTE: 0.5 THOU/MM3 (ref 0–0.4)
ERYTHROCYTE [DISTWIDTH] IN BLOOD BY AUTOMATED COUNT: 14.5 % (ref 11.5–14.5)
ERYTHROCYTE [DISTWIDTH] IN BLOOD BY AUTOMATED COUNT: 49.9 FL (ref 35–45)
GFR SERPL CREATININE-BSD FRML MDRD: > 90 ML/MIN/1.73M2
GLUCOSE BLD-MCNC: 109 MG/DL (ref 70–108)
HCT VFR BLD CALC: 46.7 % (ref 42–52)
HDLC SERPL-MCNC: 45 MG/DL
HEMOGLOBIN: 14.9 GM/DL (ref 14–18)
IMMATURE GRANS (ABS): 0.03 THOU/MM3 (ref 0–0.07)
IMMATURE GRANULOCYTES: 0.4 %
LDL CHOLESTEROL CALCULATED: 82 MG/DL
LYMPHOCYTES # BLD: 25.7 %
LYMPHOCYTES ABSOLUTE: 2.1 THOU/MM3 (ref 1–4.8)
MCH RBC QN AUTO: 30.4 PG (ref 26–33)
MCHC RBC AUTO-ENTMCNC: 31.9 GM/DL (ref 32.2–35.5)
MCV RBC AUTO: 95.3 FL (ref 80–94)
MONOCYTES # BLD: 11.4 %
MONOCYTES ABSOLUTE: 0.9 THOU/MM3 (ref 0.4–1.3)
NUCLEATED RED BLOOD CELLS: 0 /100 WBC
PLATELET # BLD: 270 THOU/MM3 (ref 130–400)
PMV BLD AUTO: 10.7 FL (ref 9.4–12.4)
POTASSIUM SERPL-SCNC: 4.2 MEQ/L (ref 3.5–5.2)
RBC # BLD: 4.9 MILL/MM3 (ref 4.7–6.1)
SEG NEUTROPHILS: 54.8 %
SEGMENTED NEUTROPHILS ABSOLUTE COUNT: 4.5 THOU/MM3 (ref 1.8–7.7)
SODIUM BLD-SCNC: 137 MEQ/L (ref 135–145)
TOTAL PROTEIN: 8.2 G/DL (ref 6.1–8)
TRIGL SERPL-MCNC: 105 MG/DL (ref 0–199)
WBC # BLD: 8.2 THOU/MM3 (ref 4.8–10.8)

## 2021-01-19 PROCEDURE — 85025 COMPLETE CBC W/AUTO DIFF WBC: CPT

## 2021-01-19 PROCEDURE — 36415 COLL VENOUS BLD VENIPUNCTURE: CPT

## 2021-01-19 PROCEDURE — 80061 LIPID PANEL: CPT

## 2021-01-19 PROCEDURE — 80053 COMPREHEN METABOLIC PANEL: CPT

## 2021-01-20 ENCOUNTER — TELEPHONE (OUTPATIENT)
Dept: FAMILY MEDICINE CLINIC | Age: 81
End: 2021-01-20

## 2021-01-20 DIAGNOSIS — E83.52 SERUM CALCIUM ELEVATED: ICD-10-CM

## 2021-01-20 DIAGNOSIS — R77.9 ELEVATED SERUM PROTEIN LEVEL: Primary | ICD-10-CM

## 2021-01-20 NOTE — TELEPHONE ENCOUNTER
Lipids and CBC are good. CMP shows minimally elevated glucose, mildly elevated calcium level and mildly elevated total protein level. Recheck CMP in 2 weeks. Please advise patient.   Axel Varghese MD

## 2021-02-03 ENCOUNTER — HOSPITAL ENCOUNTER (OUTPATIENT)
Age: 81
Discharge: HOME | End: 2021-02-03
Payer: MEDICARE

## 2021-02-03 DIAGNOSIS — E83.52 SERUM CALCIUM ELEVATED: ICD-10-CM

## 2021-02-03 DIAGNOSIS — R77.9 ELEVATED SERUM PROTEIN LEVEL: ICD-10-CM

## 2021-02-03 LAB
ALBUMIN SERPL-MCNC: 4.9 G/DL (ref 3.5–5.1)
ALP BLD-CCNC: 61 U/L (ref 38–126)
ALT SERPL-CCNC: 6 U/L (ref 11–66)
ANION GAP SERPL CALCULATED.3IONS-SCNC: 10 MEQ/L (ref 8–16)
AST SERPL-CCNC: 16 U/L (ref 5–40)
BILIRUB SERPL-MCNC: 0.9 MG/DL (ref 0.3–1.2)
BUN BLDV-MCNC: 24 MG/DL (ref 7–22)
CALCIUM SERPL-MCNC: 10.4 MG/DL (ref 8.5–10.5)
CHLORIDE BLD-SCNC: 100 MEQ/L (ref 98–111)
CO2: 27 MEQ/L (ref 23–33)
CREAT SERPL-MCNC: 0.9 MG/DL (ref 0.4–1.2)
GFR SERPL CREATININE-BSD FRML MDRD: 81 ML/MIN/1.73M2
GLUCOSE BLD-MCNC: 109 MG/DL (ref 70–108)
POTASSIUM SERPL-SCNC: 4.2 MEQ/L (ref 3.5–5.2)
SODIUM BLD-SCNC: 137 MEQ/L (ref 135–145)
TOTAL PROTEIN: 8.3 G/DL (ref 6.1–8)

## 2021-02-03 PROCEDURE — 80053 COMPREHEN METABOLIC PANEL: CPT

## 2021-02-03 PROCEDURE — 36415 COLL VENOUS BLD VENIPUNCTURE: CPT

## 2021-02-08 ENCOUNTER — TELEPHONE (OUTPATIENT)
Dept: FAMILY MEDICINE CLINIC | Age: 81
End: 2021-02-08

## 2021-02-16 ENCOUNTER — OFFICE VISIT (OUTPATIENT)
Dept: FAMILY MEDICINE CLINIC | Age: 81
End: 2021-02-16
Payer: MEDICARE

## 2021-02-16 VITALS
HEART RATE: 63 BPM | OXYGEN SATURATION: 96 % | TEMPERATURE: 97.7 F | SYSTOLIC BLOOD PRESSURE: 130 MMHG | WEIGHT: 198.4 LBS | BODY MASS INDEX: 26.29 KG/M2 | DIASTOLIC BLOOD PRESSURE: 82 MMHG | HEIGHT: 73 IN

## 2021-02-16 DIAGNOSIS — I10 ESSENTIAL HYPERTENSION: ICD-10-CM

## 2021-02-16 DIAGNOSIS — I25.10 ASHD (ARTERIOSCLEROTIC HEART DISEASE): ICD-10-CM

## 2021-02-16 DIAGNOSIS — Z00.00 ROUTINE GENERAL MEDICAL EXAMINATION AT A HEALTH CARE FACILITY: Primary | ICD-10-CM

## 2021-02-16 DIAGNOSIS — N40.0 BENIGN NON-NODULAR PROSTATIC HYPERPLASIA WITHOUT LOWER URINARY TRACT SYMPTOMS: ICD-10-CM

## 2021-02-16 DIAGNOSIS — M62.838 MUSCLE SPASM: ICD-10-CM

## 2021-02-16 PROCEDURE — G0439 PPPS, SUBSEQ VISIT: HCPCS | Performed by: FAMILY MEDICINE

## 2021-02-16 RX ORDER — TERAZOSIN 5 MG/1
5 CAPSULE ORAL NIGHTLY
Qty: 90 CAPSULE | Refills: 1 | Status: SHIPPED | OUTPATIENT
Start: 2021-02-16 | End: 2021-08-16 | Stop reason: SDUPTHER

## 2021-02-16 RX ORDER — HYDROCHLOROTHIAZIDE 25 MG/1
25 TABLET ORAL DAILY
Qty: 90 TABLET | Refills: 1 | Status: SHIPPED | OUTPATIENT
Start: 2021-02-16 | End: 2021-08-16 | Stop reason: SDUPTHER

## 2021-02-16 RX ORDER — ROSUVASTATIN CALCIUM 5 MG/1
5 TABLET, COATED ORAL NIGHTLY
Qty: 90 TABLET | Refills: 1 | Status: SHIPPED | OUTPATIENT
Start: 2021-02-16 | End: 2021-07-22

## 2021-02-16 RX ORDER — CAPTOPRIL 12.5 MG/1
TABLET ORAL
COMMUNITY
Start: 2021-02-03 | End: 2021-02-16

## 2021-02-16 RX ORDER — BACLOFEN 10 MG/1
10 TABLET ORAL 2 TIMES DAILY PRN
Qty: 180 TABLET | Refills: 1 | Status: SHIPPED | OUTPATIENT
Start: 2021-02-16 | End: 2021-08-16 | Stop reason: SDUPTHER

## 2021-02-16 ASSESSMENT — PATIENT HEALTH QUESTIONNAIRE - PHQ9
SUM OF ALL RESPONSES TO PHQ QUESTIONS 1-9: 0
2. FEELING DOWN, DEPRESSED OR HOPELESS: 0
SUM OF ALL RESPONSES TO PHQ QUESTIONS 1-9: 0

## 2021-02-16 ASSESSMENT — LIFESTYLE VARIABLES: HOW OFTEN DO YOU HAVE A DRINK CONTAINING ALCOHOL: 0

## 2021-02-16 NOTE — PROGRESS NOTES
Medicare Annual Wellness Visit  Name: Alexandra Jenkins Date: 2021   MRN: [id number] Sex: Male   Age: 79 y.o. Ethnicity: Non-/Non    : 1940 Race: Diana Anderson is here for Medicare AWV, 6 Month Follow-Up, and Hypertension    Screenings for behavioral, psychosocial and functional/safety risks, and cognitive dysfunction are all negative except as indicated below. These results, as well as other patient data from the [address] form, are documented in Flowsheets linked to this Encounter. Allergies   Allergen Reactions    Latex Rash    Gluten Meal     Vasotec [Enalaprilat] Other (See Comments)     cough    Neosporin [Neomycin-Polymyxin-Gramicidin] Rash    Sulfa Antibiotics Rash    Voltaren [Diclofenac Sodium] Rash     gel         Prior to Visit Medications    Medication Sig Taking? Authorizing Provider   hydroCHLOROthiazide (HYDRODIURIL) 25 MG tablet Take 1 tablet by mouth daily Yes Berry Moreau MD   terazosin (HYTRIN) 5 MG capsule Take 1 capsule by mouth nightly Yes Berry Moreau MD   rosuvastatin (CRESTOR) 5 MG tablet Take 1 tablet by mouth nightly Yes Berry Moreau MD   baclofen (LIORESAL) 10 MG tablet Take 1 tablet by mouth 2 times daily as needed (muscle spasm) Yes Berry Moreau MD   famotidine (PEPCID) 20 MG tablet Take 1 tablet by mouth 2 times daily Yes Berry Moreau MD   finasteride (PROSCAR) 5 MG tablet Take 1 tablet by mouth daily Yes SID Bauer - CNP   mesalamine (ASACOL HD) 800 MG TBEC TBEC tablet Take 800 mg by mouth daily  Yes Historical Provider, MD   captopril (CAPOTEN) 25 MG tablet Take 25 mg by mouth 2 times daily Yes Historical Provider, MD   carvedilol (COREG) 25 MG tablet Take 25 mg by mouth 2 times daily (with meals) Yes Historical Provider, MD   clopidogrel (PLAVIX) 75 MG tablet Take 1 tablet by mouth daily.   Patient taking differently: Take 75 mg by mouth nightly  Yes Andreina Jimenez MD   digoxin (LANOXIN) 125 MCG tablet Take 1 tablet by mouth daily. Yes Ti Marion MD   gemfibrozil (LOPID) 600 MG tablet Take 1 tablet by mouth 2 times daily (before meals). Yes Ti Marion MD   Coenzyme Q10 (COQ10) 200 MG CAPS Take 200 mg/day by mouth 2 times daily  Yes Ti Marion MD   Bobby Randhawa repens, 450 MG CAPS Take 425 mg/day by mouth daily. Yes Ti Marion MD       Past Medical History:   Diagnosis Date    Arthritis     Atrial fibrillation [facility])     CAD (coronary artery disease)     CHF (congestive heart failure) (HCC)     Hyperlipidemia     Hypertension        Past Surgical History:   Procedure Laterality Date    BUNIONECTOMY Left [address]   Tapan Naidu Left 2015    Dr. Rancho Hagen       Dr. Favian Adair  2016    Dr. Carmelo Acevedo Right 1960's    inguinal    PACEMAKER INSERTION  2008    Dr. Maksim Leach  2016    Dr. Bill Sandhu History   Problem Relation Age of Onset    Kidney Disease Mother     Cancer Father        CareTeam (Including outside providers/suppliers regularly involved in providing care):   Patient Care Team:  Royer Tiwari MD as PCP - General (Family Medicine)  Royer Tiwari [facility]    Wt Readings from Last 3 Encounters:   02/16/21 198 lb 6.4 oz (90 kg)   07/20/20 196 lb 9.6 oz (89.2 kg)   06/26/20 194 lb (88 kg)     Vitals:    02/16/21 1044   BP: 130/82   Site: Left Upper Arm   Position: Sitting   Pulse: 63   Temp: 97.7 °F (36.5 °C)   SpO2: 96%   Weight: 198 lb 6.4 oz (90 kg)   Height: 6' 1\" (1.854 m)     Body mass index is 26.18 kg/m². Based upon direct observation of the patient, evaluation of cognition reveals recent and remote memory intact. Physical Exam  Vitals signs reviewed. Constitutional:       General: He is not in acute distress. Appearance: He is well-developed. Cardiovascular:      Rate and Rhythm: Normal rate and regular rhythm. Heart sounds: No murmur. Pulmonary:      Effort: Pulmonary effort is normal. No respiratory distress. Breath sounds: Normal breath sounds. No wheezing. Musculoskeletal:      Right lower leg: No edema. Left lower leg: No edema. Neurological:      Mental Status: He is alert. Mental status is at baseline. Psychiatric:         Mood and Affect: Mood normal.         Behavior: Behavior normal.           Patient's complete Health Risk Assessment and screening values have been reviewed and are found in Flowsheets. The following problems were reviewed today and where indicated follow up appointments were made and/or referrals ordered.     Positive Risk Factor Screenings with Interventions:            Hearing/Vision:  No exam data present  Hearing/Vision  Do you or your family notice any trouble with your hearing that hasn't been managed with hearing aids?: No  Do you have difficulty driving, watching TV, or doing any of your daily activities because of your eyesight?: No  Have you had an eye exam within the past year?: (!) No  Hearing/Vision Interventions:  · Vision concerns:  patient encouraged to make appointment with his/her eye specialist    Safety:  Safety  Do you have working smoke detectors?: Yes  Have all throw rugs been removed or fastened?: Yes  Do you have non-slip mats or surfaces in all bathtubs/showers?: Yes  Do all of your stairways have a railing or banister?: (!) No  Are your doorways, halls and stairs free of clutter?: Yes  Do you always fasten your seatbelt when you are in a car?: Yes  Safety Interventions:  · does not have stairways     Personalized Preventive Plan   Current Health Maintenance Status  Immunization History   Administered Date(s) Administered    COVID-19, Pfizer, 30mcg/0.3ml Dose 01/27/2021    Influenza Vaccine, unspecified formulation 10/14/2016    Influenza Virus Vaccine 09/18/2017    Influenza, High Dose (Fluzone 65 yrs and older) 12/02/2015    Influenza, Sigmund Prier, IM, (6 mo and older Fluzone, Flulaval, Fluarix and 3 yrs and older Afluria) 10/09/2018    Influenza, Quadv, adjuvanted, 65 yrs +, IM, PF (Fluad) 10/13/2020    Influenza, Triv, inactivated, subunit, adjuvanted, IM (Fluad 65 yrs and older) 10/31/2019    Pneumococcal Conjugate 13-valent ([id number]) 11/21/2014    Pneumococcal Conjugate 7-valent (Prevnar7) 11/01/2004    Pneumococcal Polysaccharide ([id number]) 06/28/2017    Tdap (Boostrix, Adacel) 06/09/2017        Health Maintenance   Topic Date Due    Shingles Vaccine (1 of 2) 10/06/1989    Annual Wellness Visit (AWV)  05/29/2019    COVID-19 Vaccine (2 of 2 - Pfizer series) 02/17/2021    Lipid screen  01/19/2022    Potassium monitoring  02/03/2022    Creatinine monitoring  02/03/2022    DTaP/Tdap/Td vaccine (2 - Td) 06/09/2027    Flu vaccine  Completed    Pneumococcal 65+ years Vaccine  Completed    Hepatitis A vaccine  Aged Out    Hepatitis B vaccine  Aged Out    Hib vaccine  Aged Out    Meningococcal (ACWY) vaccine  Aged Out     Recommendations for Tittat Due: see orders and patient instructions/AVS.  . Recommended screening schedule for the next 5-10 years is provided to the patient in written form: see Patient Instructions/AVS.    Tera Alvarez was seen today for medicare awv, 6 month follow-up and hypertension. Diagnoses and all orders for this visit:    Routine general medical examination at a health care facility    ASHD (arteriosclerotic heart disease)  -     hydroCHLOROthiazide (HYDRODIURIL) 25 MG tablet; Take 1 tablet by mouth daily  -     terazosin (HYTRIN) 5 MG capsule; Take 1 capsule by mouth nightly  -     rosuvastatin (CRESTOR) 5 MG tablet; Take 1 tablet by mouth nightly    Benign non-nodular prostatic hyperplasia without lower urinary tract symptoms  -     terazosin (HYTRIN) 5 MG capsule;  Take 1 capsule by mouth nightly    Essential hypertension  -     terazosin (HYTRIN) 5 MG capsule; Take 1 capsule by mouth nightly    Muscle spasm  -     baclofen (LIORESAL) 10 MG tablet;  Take 1 tablet by mouth 2 times daily as needed (muscle spasm)            Above Problems are deemed chronic and controlled and medications are refilled    F/u 6 months HTN, CAD    Jamal El MD

## 2021-02-16 NOTE — PATIENT INSTRUCTIONS
Personalized Preventive Plan for Phillip Bustillo - 2/16/2021  Medicare offers a range of preventive health benefits. Some of the tests and screenings are paid in full while other may be subject to a deductible, co-insurance, and/or copay. Some of these benefits include a comprehensive review of your medical history including lifestyle, illnesses that may run in your family, and various assessments and screenings as appropriate. After reviewing your medical record and screening and assessments performed today your provider may have ordered immunizations, labs, imaging, and/or referrals for you. A list of these orders (if applicable) as well as your Preventive Care list are included within your After Visit Summary for your review. Other Preventive Recommendations:    · A preventive eye exam performed by an eye specialist is recommended every 1-2 years to screen for glaucoma; cataracts, macular degeneration, and other eye disorders. · A preventive dental visit is recommended every 6 months. · Try to get at least 150 minutes of exercise per week or 10,000 steps per day on a pedometer . · Order or download the FREE \"Exercise & Physical Activity: Your Everyday Guide\" from [employer]. Call 6-231.597.5656 or search [employer] online. · You need 0011-2397 mg of calcium and 6552-9897 IU of vitamin D per day. It is possible to meet your calcium requirement with diet alone, but a vitamin D supplement is usually necessary to meet this goal.  · When exposed to the sun, use a sunscreen that protects against both UVA and UVB radiation with an SPF of 30 or greater. Reapply every 2 to 3 hours or after sweating, drying off with a towel, or swimming. · Always wear a seat belt when traveling in a car. Always wear a helmet when riding a bicycle or motorcycle.

## 2021-03-24 ENCOUNTER — NURSE ONLY (OUTPATIENT)
Dept: LAB | Age: 81
End: 2021-03-24

## 2021-04-05 ENCOUNTER — HOSPITAL ENCOUNTER (OUTPATIENT)
Dept: GENERAL RADIOLOGY | Age: 81
Discharge: HOME | End: 2021-04-05
Payer: MEDICARE

## 2021-04-05 ENCOUNTER — OFFICE VISIT (OUTPATIENT)
Dept: FAMILY MEDICINE CLINIC | Age: 81
End: 2021-04-05
Payer: MEDICARE

## 2021-04-05 ENCOUNTER — HOSPITAL ENCOUNTER (OUTPATIENT)
Age: 81
Discharge: HOME | End: 2021-04-05
Payer: MEDICARE

## 2021-04-05 VITALS
HEIGHT: 73 IN | SYSTOLIC BLOOD PRESSURE: 138 MMHG | HEART RATE: 70 BPM | OXYGEN SATURATION: 96 % | BODY MASS INDEX: 26.65 KG/M2 | RESPIRATION RATE: 14 BRPM | TEMPERATURE: 98.2 F | DIASTOLIC BLOOD PRESSURE: 80 MMHG | WEIGHT: 201.1 LBS

## 2021-04-05 DIAGNOSIS — M25.561 CHRONIC PAIN OF RIGHT KNEE: Primary | ICD-10-CM

## 2021-04-05 DIAGNOSIS — Z87.39 H/O CALCIUM PYROPHOSPHATE DEPOSITION DISEASE (CPPD): ICD-10-CM

## 2021-04-05 DIAGNOSIS — M17.11 LOCALIZED OSTEOARTHRITIS OF RIGHT KNEE: ICD-10-CM

## 2021-04-05 DIAGNOSIS — M25.551 RIGHT HIP PAIN: ICD-10-CM

## 2021-04-05 DIAGNOSIS — G89.29 CHRONIC PAIN OF RIGHT KNEE: Primary | ICD-10-CM

## 2021-04-05 PROCEDURE — 73502 X-RAY EXAM HIP UNI 2-3 VIEWS: CPT

## 2021-04-05 PROCEDURE — 99213 OFFICE O/P EST LOW 20 MIN: CPT | Performed by: FAMILY MEDICINE

## 2021-04-05 PROCEDURE — 20610 DRAIN/INJ JOINT/BURSA W/O US: CPT | Performed by: FAMILY MEDICINE

## 2021-04-05 RX ORDER — TRIAMCINOLONE ACETONIDE 40 MG/ML
80 INJECTION, SUSPENSION INTRA-ARTICULAR; INTRAMUSCULAR ONCE
Status: COMPLETED | OUTPATIENT
Start: 2021-04-05 | End: 2021-04-05

## 2021-04-05 RX ORDER — BUPIVACAINE HYDROCHLORIDE 5 MG/ML
4 INJECTION, SOLUTION PERINEURAL ONCE
Status: COMPLETED | OUTPATIENT
Start: 2021-04-05 | End: 2021-04-05

## 2021-04-05 RX ORDER — APIXABAN 5 MG/1
TABLET, FILM COATED ORAL 2 TIMES DAILY
COMMUNITY
Start: 2021-04-02 | End: 2022-09-19 | Stop reason: SDUPTHER

## 2021-04-05 RX ORDER — LIDOCAINE HYDROCHLORIDE 10 MG/ML
4 INJECTION, SOLUTION INFILTRATION; PERINEURAL ONCE
Status: COMPLETED | OUTPATIENT
Start: 2021-04-05 | End: 2021-04-05

## 2021-04-05 RX ADMIN — BUPIVACAINE HYDROCHLORIDE 20 MG: 5 INJECTION, SOLUTION PERINEURAL at 14:15

## 2021-04-05 RX ADMIN — TRIAMCINOLONE ACETONIDE 80 MG: 40 INJECTION, SUSPENSION INTRA-ARTICULAR; INTRAMUSCULAR at 14:17

## 2021-04-05 RX ADMIN — LIDOCAINE HYDROCHLORIDE 4 ML: 10 INJECTION, SOLUTION INFILTRATION; PERINEURAL at 14:16

## 2021-04-05 ASSESSMENT — PATIENT HEALTH QUESTIONNAIRE - PHQ9
SUM OF ALL RESPONSES TO PHQ QUESTIONS 1-9: 0
2. FEELING DOWN, DEPRESSED OR HOPELESS: 0
1. LITTLE INTEREST OR PLEASURE IN DOING THINGS: 0
SUM OF ALL RESPONSES TO PHQ QUESTIONS 1-9: 0

## 2021-04-05 NOTE — PROGRESS NOTES
[facility]  [address] [facility]  Dept: [phone]  Dept Fax: [phone]  Loc: [phone]  PROGRESS NOTE      Visit Date: 4/5/2021    Matthew Burns is a 79 y.o. male who presents today for:  Chief Complaint   Patient presents with    Injections     right knee       Subjective:  Knee Pain       Right knee pain:  Has CPPD. Had steroid injection in June 2020 that lasted until about 7-8 months. Having more stiffness. Pain is worse with prolonged standing. He has iced it which helps pain. He takes tylenol and baclofen for the pain. Pain is achy. Pain is 3/10 currently and 6/10 at its worst.  He tried voltaren gel and he had a reaction to it. He does his HEP.    ight hip pain. Worsening. Walking is worse. Taking tylenol    Review of Systems   Constitutional: Negative for chills and fever. Musculoskeletal: Positive for arthralgias and joint swelling. Negative for gait problem.      Patient Active Problem List   Diagnosis    Atrial fibrillation ([address]    ASHD (arteriosclerotic heart disease)    CHF (congestive heart failure) ([address]    Cholelithiasis    Gluten enteropathy    Pacemaker battery depletion    Complete heart block ([address]    Benign non-nodular prostatic hyperplasia without lower urinary tract symptoms    Lumbar degenerative disc disease    Kidney stone on right side    Right inguinal hernia    Diverticulosis of large intestine without hemorrhage    Essential hypertension    Personal history of calcium pyrophosphate deposition disease (CPPD)    Localized osteoarthritis of right knee     Past Medical History:   Diagnosis Date    Arthritis     Atrial fibrillation ([address]     CAD (coronary artery disease)     CHF (congestive heart failure) (HCC)     Hyperlipidemia     Hypertension       Past Surgical History:   Procedure Laterality Date    BUNIONECTOMY Left 1990's    CARDIAC CATHETERIZATION Palmetto, Serenoa repens, 450 MG CAPS Take 425 mg/day by mouth daily. 90 capsule 1    ELIQUIS 5 MG TABS tablet        No current facility-administered medications for this visit. Allergies   Allergen Reactions    Latex Rash    Gluten Meal     Vasotec [Enalaprilat] Other (See Comments)     cough    Neosporin [Neomycin-Polymyxin-Gramicidin] Rash    Sulfa Antibiotics Rash    Voltaren [Diclofenac Sodium] Rash     gel     Health Maintenance   Topic Date Due    Shingles Vaccine (1 of 2) Never done    Lipid screen  01/19/2022    Potassium monitoring  02/03/2022    Creatinine monitoring  02/03/2022    Annual Wellness Visit (AWV)  02/17/2022    DTaP/Tdap/Td vaccine (2 - Td) 06/09/2027    Flu vaccine  Completed    Pneumococcal 65+ years Vaccine  Completed    COVID-19 Vaccine  Completed    Hepatitis A vaccine  Aged Out    Hepatitis B vaccine  Aged Out    Hib vaccine  Aged Out    Meningococcal (ACWY) vaccine  Aged Out       Objective:  /80 (Site: Left Upper Arm, Position: Sitting)   Pulse 70   Temp 98.2 °F (36.8 °C)   Resp 14   Ht 6' 1\" (1.854 m)   Wt 201 lb 1.6 oz (91.2 kg)   SpO2 96%   BMI 26.53 kg/m²   Physical Exam  Vitals signs reviewed. Constitutional:       Appearance: He is well-developed. Neurological:      Mental Status: He is alert and oriented to person, place, and time. Psychiatric:         Behavior: Behavior normal.       right knee exam: no ecchymosis. No drainage. Mild ttp of lateral joint line. no joint effusion. ROM 0-105 degrees in flexion and extension. 5-/5 strength for flexion and extension. Negative valgus and varus. Calf is soft. Right hip:  Positive FADIR. Resting hip position is 5 degrees ER.  ROM for IR is -5 degrees. Procedure Note Right Knee Injection    Discussed risks and benefits of steroid injection, including but not limited to infection, skin discoloration, pain, and bleeding.     With the patient's verbal informed consent, his right

## 2021-04-13 ENCOUNTER — OFFICE VISIT (OUTPATIENT)
Dept: UROLOGY | Age: 81
End: 2021-04-13
Payer: MEDICARE

## 2021-04-13 VITALS
HEIGHT: 73 IN | SYSTOLIC BLOOD PRESSURE: 128 MMHG | WEIGHT: 195 LBS | BODY MASS INDEX: 25.84 KG/M2 | DIASTOLIC BLOOD PRESSURE: 84 MMHG

## 2021-04-13 DIAGNOSIS — N40.0 BENIGN NON-NODULAR PROSTATIC HYPERPLASIA WITHOUT LOWER URINARY TRACT SYMPTOMS: ICD-10-CM

## 2021-04-13 DIAGNOSIS — N40.1 BENIGN PROSTATIC HYPERPLASIA WITH LOWER URINARY TRACT SYMPTOMS, SYMPTOM DETAILS UNSPECIFIED: Primary | ICD-10-CM

## 2021-04-13 LAB
BILIRUBIN, POC: NEGATIVE
BLOOD URINE, POC: NEGATIVE
CLARITY, POC: CLEAR
COLOR, POC: YELLOW
GLUCOSE URINE, POC: NEGATIVE
KETONES, POC: NEGATIVE
LEUKOCYTE EST, POC: NEGATIVE
NITRITE, POC: NEGATIVE
PH, POC: 7
POST VOID RESIDUAL (PVR): 34 ML
PROTEIN, POC: NEGATIVE
SPECIFIC GRAVITY, POC: 1.01
UROBILINOGEN, POC: NEGATIVE

## 2021-04-13 PROCEDURE — 51798 US URINE CAPACITY MEASURE: CPT | Performed by: NURSE PRACTITIONER

## 2021-04-13 PROCEDURE — 81002 URINALYSIS NONAUTO W/O SCOPE: CPT | Performed by: NURSE PRACTITIONER

## 2021-04-13 PROCEDURE — 99213 OFFICE O/P EST LOW 20 MIN: CPT | Performed by: NURSE PRACTITIONER

## 2021-04-13 RX ORDER — FINASTERIDE 5 MG/1
5 TABLET, FILM COATED ORAL DAILY
Qty: 90 TABLET | Refills: 3 | Status: SHIPPED | OUTPATIENT
Start: 2021-04-13 | End: 2022-04-12 | Stop reason: SDUPTHER

## 2021-04-13 NOTE — PROGRESS NOTES
[facility] [place] [facility]  [facility]  [address]  [address]  Dept: [phone]  Loc: [phone]  Visit Date: 4/13/2021      HPI:     Hussein Apple f/u today for BPH. He is doing well. Symptoms are stable on finasteride and hytrin. He is happy. Has stronger stream, less frequency through the day and nocturia is improved. Remains on Hytrin 5 mg. PVR is 34    Cysto on 04/23/18 revealed markedly obstructing prostate with bladder trabeculations. Urine cytology was negative for high grade urothelial carcinoma. Trend of PSA:  1.95 04/17/18      He comes in today by himself. Hx is obtained from the patient and medical record. Current Outpatient Medications   Medication Sig Dispense Refill    finasteride (PROSCAR) 5 MG tablet Take 1 tablet by mouth daily 90 tablet 3    ELIQUIS 5 MG TABS tablet       hydroCHLOROthiazide (HYDRODIURIL) 25 MG tablet Take 1 tablet by mouth daily 90 tablet 1    terazosin (HYTRIN) 5 MG capsule Take 1 capsule by mouth nightly 90 capsule 1    rosuvastatin (CRESTOR) 5 MG tablet Take 1 tablet by mouth nightly 90 tablet 1    baclofen (LIORESAL) 10 MG tablet Take 1 tablet by mouth 2 times daily as needed (muscle spasm) 180 tablet 1    famotidine (PEPCID) 20 MG tablet Take 1 tablet by mouth 2 times daily 180 tablet 1    mesalamine (ASACOL HD) 800 MG TBEC TBEC tablet Take 800 mg by mouth daily       captopril (CAPOTEN) 25 MG tablet Take 25 mg by mouth 2 times daily      carvedilol (COREG) 25 MG tablet Take 25 mg by mouth 2 times daily (with meals)      clopidogrel (PLAVIX) 75 MG tablet Take 1 tablet by mouth daily. (Patient taking differently: Take 75 mg by mouth nightly ) 90 tablet 1    digoxin (LANOXIN) 125 MCG tablet Take 1 tablet by mouth daily. 90 tablet 1    gemfibrozil (LOPID) 600 MG tablet Take 1 tablet by mouth 2 times daily (before meals).  180 tablet 1    Coenzyme Q10 (COQ10) 200 MG CAPS Take 200 mg/day by mouth 2 times daily  180 capsule 1    Saw Palmetto, Serenoa repens, 450 MG CAPS Take 425 mg/day by mouth daily. 90 capsule 1     No current facility-administered medications for this visit. Past Medical History  Ivonne Jones  has a past medical history of Arthritis, Atrial fibrillation ([facility] CAD (coronary artery disease), CHF (congestive heart failure) ([facility] Hyperlipidemia, and Hypertension. Past Surgical History  The patient  has a past surgical history that includes Pacemaker insertion (2008); Bunionectomy (Left, 1990's); Pilonidal cyst drainage (1970); hernia repair (Right, 1960's); Cardiac catheterization; Cataract removal with implant (Left, 2015); Colonoscopy; Colonoscopy (2016); and Upper gastrointestinal endoscopy (2016). Family History  This patient's family history includes Cancer in his father; Kidney Disease in his mother. Social History  Ivonne Jones  reports that he quit smoking about 56 years ago. He has a 7.50 pack-year smoking history. He has never used smokeless tobacco. He reports that he does not drink alcohol or use drugs. Subjective:     Review of Systems  No problems with ears, nose or throat. No problems with eyes. No chest pain, shortness of breath, abdominal pain, extremity pain or weakness, and no neurological deficits. No rashes.  symptoms per HPI. The remainder of the review of symptoms is negative. Objective:     PE:   Vitals:    04/13/21 0945   BP: 128/84   Weight: 195 lb (88.5 kg)   Height: 6' 1\" (1.854 m)       Constitutional: Alert and oriented times 3, no acute distress and cooperative to examination with appropriate mood and affect. HENT:   Head:        Normocephalic and atraumatic. Mouth/Throat:         Mucous membranes are normal.   Eyes:         EOM are normal. No scleral icterus. PERRLA. Neck:        Supple, symmetrical, trachea midline  Pulmonary/Chest:      Chest symmetric with normal A/P diameter, Normal respiratory rate and rhthym.   No use of accessory muscles. Abdominal:         Soft. No tenderness. Bowel sounds present. : Rectal: enlarged prostate, normal tone, no masses, nodules, induration palpated, smooth and freely movable  Musculoskeletal:         Normal range of motion. No edema or tenderness of lower extremities. Extremities: No cyanosis, clubbing, or edema present. Neurological:        Alert and oriented. No cranial nerve deficit. Rashi Rich Psychiatric:        Normal mood and affect. Labs   Urine dip demonstrates   Results for POC orders placed in visit on 04/13/21   poct post void residual   Result Value Ref Range    post void residual 34 ml   POCT Urinalysis no Micro   Result Value Ref Range    Color, UA yellow     Clarity, UA clear     Glucose, UA POC negative     Bilirubin, UA negative     Ketones, UA negative     Spec Grav, UA 1.010     Blood, UA POC negative     pH, UA 7.0     Protein, UA POC negative     Urobilinogen, UA negative     Leukocytes, UA negative     Nitrite, UA negative        Patients recent PSA values are as follows  Lab Results   Component Value Date    PSA 1.95 (H) 04/17/2018        Recent BUN/Creatinine:  Lab Results   Component Value Date    BUN 24 02/03/2021    CREATININE 0.9 02/03/2021       Radiology  No recent imaging        Assessment & Plan:     BPH w/ LUTS  Hx of Hematuria    Gaston f/u today for BPH. He is doing well, LUTS have improved with addition of Finasteride which is good. He is tolerating well with no side effects. PVR has improved to 34 ml. He will continue on Hytrin and Finasteride. If symptoms worsen in future, would recommend TURP. Will obtain PSA for screening purposes. MARI benign. FU in 1 year.       Electronically signed by SID Brown CNP on 4/13/2021 at 10:04 AM

## 2021-06-17 ENCOUNTER — TELEPHONE (OUTPATIENT)
Dept: UROLOGY | Age: 81
End: 2021-06-17

## 2021-06-17 ENCOUNTER — HOSPITAL ENCOUNTER (OUTPATIENT)
Age: 81
Discharge: HOME | End: 2021-06-17
Payer: MEDICARE

## 2021-06-17 DIAGNOSIS — N40.0 BENIGN NON-NODULAR PROSTATIC HYPERPLASIA WITHOUT LOWER URINARY TRACT SYMPTOMS: ICD-10-CM

## 2021-06-17 LAB
ALBUMIN SERPL-MCNC: 4.8 G/DL (ref 3.5–5.1)
ALP BLD-CCNC: 66 U/L (ref 38–126)
ALT SERPL-CCNC: 6 U/L (ref 11–66)
ANION GAP SERPL CALCULATED.3IONS-SCNC: 14 MEQ/L (ref 8–16)
AST SERPL-CCNC: 17 U/L (ref 5–40)
BILIRUB SERPL-MCNC: 0.8 MG/DL (ref 0.3–1.2)
BILIRUBIN DIRECT: < 0.2 MG/DL (ref 0–0.3)
BUN BLDV-MCNC: 17 MG/DL (ref 7–22)
CALCIUM SERPL-MCNC: 10.3 MG/DL (ref 8.5–10.5)
CHLORIDE BLD-SCNC: 101 MEQ/L (ref 98–111)
CHOLESTEROL, TOTAL: 129 MG/DL (ref 100–199)
CO2: 24 MEQ/L (ref 23–33)
CREAT SERPL-MCNC: 0.7 MG/DL (ref 0.4–1.2)
GFR SERPL CREATININE-BSD FRML MDRD: > 90 ML/MIN/1.73M2
GLUCOSE BLD-MCNC: 111 MG/DL (ref 70–108)
HDLC SERPL-MCNC: 44 MG/DL
LDL CHOLESTEROL CALCULATED: 67 MG/DL
POTASSIUM SERPL-SCNC: 4 MEQ/L (ref 3.5–5.2)
PROSTATE SPECIFIC ANTIGEN: 0.45 NG/ML (ref 0–1)
SODIUM BLD-SCNC: 139 MEQ/L (ref 135–145)
TOTAL PROTEIN: 7.8 G/DL (ref 6.1–8)
TRIGL SERPL-MCNC: 90 MG/DL (ref 0–199)

## 2021-06-17 PROCEDURE — 84153 ASSAY OF PSA TOTAL: CPT

## 2021-06-17 PROCEDURE — 36415 COLL VENOUS BLD VENIPUNCTURE: CPT

## 2021-06-17 PROCEDURE — 80061 LIPID PANEL: CPT

## 2021-06-17 PROCEDURE — 82248 BILIRUBIN DIRECT: CPT

## 2021-06-17 PROCEDURE — 80053 COMPREHEN METABOLIC PANEL: CPT

## 2021-07-21 DIAGNOSIS — I25.10 ASHD (ARTERIOSCLEROTIC HEART DISEASE): ICD-10-CM

## 2021-07-22 RX ORDER — ROSUVASTATIN CALCIUM 5 MG/1
TABLET, COATED ORAL
Qty: 90 TABLET | Refills: 1 | Status: SHIPPED | OUTPATIENT
Start: 2021-07-22 | End: 2021-08-16 | Stop reason: SDUPTHER

## 2021-07-22 NOTE — TELEPHONE ENCOUNTER
Aly Moreau [facility] called requesting a refill on the following medications:  Requested Prescriptions     Pending Prescriptions Disp Refills    rosuvastatin (CRESTOR) 5 MG tablet [Pharmacy Med Name: ROSUVASTATIN TAB 5MG] 90 tablet 1     Sig: TAKE 1 TABLET NIGHTLY       Date of last visit: 4/5/2021  Date of next visit (if applicable):8/16/2021  Date of last refill: 02/16/21  Pharmacy Name: Rocio Felder LPN

## 2021-08-06 ENCOUNTER — TELEPHONE (OUTPATIENT)
Dept: FAMILY MEDICINE CLINIC | Age: 81
End: 2021-08-06

## 2021-08-16 ENCOUNTER — OFFICE VISIT (OUTPATIENT)
Dept: FAMILY MEDICINE CLINIC | Age: 81
End: 2021-08-16
Payer: MEDICARE

## 2021-08-16 VITALS
DIASTOLIC BLOOD PRESSURE: 78 MMHG | HEIGHT: 73 IN | SYSTOLIC BLOOD PRESSURE: 128 MMHG | BODY MASS INDEX: 25.9 KG/M2 | HEART RATE: 62 BPM | RESPIRATION RATE: 18 BRPM | TEMPERATURE: 96.9 F | OXYGEN SATURATION: 97 % | WEIGHT: 195.4 LBS

## 2021-08-16 DIAGNOSIS — I10 ESSENTIAL HYPERTENSION: Primary | ICD-10-CM

## 2021-08-16 DIAGNOSIS — M17.11 LOCALIZED OSTEOARTHRITIS OF RIGHT KNEE: ICD-10-CM

## 2021-08-16 DIAGNOSIS — I48.21 PERMANENT ATRIAL FIBRILLATION (HCC): ICD-10-CM

## 2021-08-16 DIAGNOSIS — N40.0 BENIGN NON-NODULAR PROSTATIC HYPERPLASIA WITHOUT LOWER URINARY TRACT SYMPTOMS: ICD-10-CM

## 2021-08-16 DIAGNOSIS — K29.30 CHRONIC SUPERFICIAL GASTRITIS WITHOUT BLEEDING: ICD-10-CM

## 2021-08-16 DIAGNOSIS — M62.838 MUSCLE SPASM: ICD-10-CM

## 2021-08-16 DIAGNOSIS — I25.10 ASHD (ARTERIOSCLEROTIC HEART DISEASE): ICD-10-CM

## 2021-08-16 PROCEDURE — 99214 OFFICE O/P EST MOD 30 MIN: CPT | Performed by: FAMILY MEDICINE

## 2021-08-16 RX ORDER — BACLOFEN 10 MG/1
10 TABLET ORAL 2 TIMES DAILY PRN
Qty: 180 TABLET | Refills: 1 | Status: SHIPPED | OUTPATIENT
Start: 2021-08-16 | End: 2022-02-21 | Stop reason: SDUPTHER

## 2021-08-16 RX ORDER — ROSUVASTATIN CALCIUM 5 MG/1
5 TABLET, COATED ORAL DAILY
Qty: 90 TABLET | Refills: 1 | Status: SHIPPED | OUTPATIENT
Start: 2021-08-16 | End: 2022-02-21 | Stop reason: SDUPTHER

## 2021-08-16 RX ORDER — HYDROCHLOROTHIAZIDE 25 MG/1
25 TABLET ORAL DAILY
Qty: 90 TABLET | Refills: 1 | Status: SHIPPED | OUTPATIENT
Start: 2021-08-16 | End: 2022-02-21 | Stop reason: SDUPTHER

## 2021-08-16 RX ORDER — TERAZOSIN 5 MG/1
5 CAPSULE ORAL NIGHTLY
Qty: 90 CAPSULE | Refills: 1 | Status: SHIPPED | OUTPATIENT
Start: 2021-08-16 | End: 2022-02-21 | Stop reason: SDUPTHER

## 2021-08-16 SDOH — ECONOMIC STABILITY: FOOD INSECURITY: WITHIN THE PAST 12 MONTHS, YOU WORRIED THAT YOUR FOOD WOULD RUN OUT BEFORE YOU GOT MONEY TO BUY MORE.: NEVER TRUE

## 2021-08-16 SDOH — ECONOMIC STABILITY: FOOD INSECURITY: WITHIN THE PAST 12 MONTHS, THE FOOD YOU BOUGHT JUST DIDN'T LAST AND YOU DIDN'T HAVE MONEY TO GET MORE.: NEVER TRUE

## 2021-08-16 ASSESSMENT — ENCOUNTER SYMPTOMS
BLOOD IN STOOL: 0
ABDOMINAL PAIN: 0
WHEEZING: 0
SHORTNESS OF BREATH: 0

## 2021-08-16 ASSESSMENT — PATIENT HEALTH QUESTIONNAIRE - PHQ9
SUM OF ALL RESPONSES TO PHQ QUESTIONS 1-9: 0
1. LITTLE INTEREST OR PLEASURE IN DOING THINGS: 0
SUM OF ALL RESPONSES TO PHQ9 QUESTIONS 1 & 2: 0
2. FEELING DOWN, DEPRESSED OR HOPELESS: 0
SUM OF ALL RESPONSES TO PHQ QUESTIONS 1-9: 0
SUM OF ALL RESPONSES TO PHQ QUESTIONS 1-9: 0

## 2021-08-16 ASSESSMENT — SOCIAL DETERMINANTS OF HEALTH (SDOH): HOW HARD IS IT FOR YOU TO PAY FOR THE VERY BASICS LIKE FOOD, HOUSING, MEDICAL CARE, AND HEATING?: NOT HARD AT ALL

## 2021-08-16 NOTE — PROGRESS NOTES
[facility]  [facility]  [address] [facility]ly [facility]  Dept: [phone]  Dept Fax: [phone]  Loc: [phone]  PROGRESS NOTE      Visit Date: 8/16/2021    Amilcar Duarte is a 79 y.o. male who presents today for:  Chief Complaint   Patient presents with    6 Month Follow-Up    Hypertension       Subjective:  HPI       6 month f/u     HTN:  On hctz, hytrin, coreg, and captopril. Has A. Fib, CAD, and chronic systolic CHF. Has pacemaker. On eliquis    Gastritis. On pepcid. No abd pain or nausea. Hypercholesterolemia:  On crestor and lopid. No myalgias. He is on co Q 10. BPH:  On hytrin and proscar. Sees urology. muscle spasms: takes baclofen prn    Right knee pain. Has OA. Last steroid injection was in April 2021. Pain is 4/10. No concerns    Review of Systems   Respiratory: Negative for shortness of breath and wheezing. Cardiovascular: Negative for chest pain and leg swelling. Gastrointestinal: Negative for abdominal pain and blood in stool. Musculoskeletal: Positive for arthralgias. Neurological: Negative for dizziness and syncope.      Patient Active Problem List   Diagnosis    Atrial fibrillation ([address]    ASHD (arteriosclerotic heart disease)    CHF (congestive heart failure) ([address]    Cholelithiasis    Gluten enteropathy    Pacemaker battery depletion    Complete heart block ([address]    Benign non-nodular prostatic hyperplasia without lower urinary tract symptoms    Lumbar degenerative disc disease    Kidney stone on right side    Right inguinal hernia    Diverticulosis of large intestine without hemorrhage    Essential hypertension    Personal history of calcium pyrophosphate deposition disease (CPPD)    Localized osteoarthritis of right knee     Past Medical History:   Diagnosis Date    Arthritis     Atrial fibrillation (HCC)     CAD (coronary artery disease)     [facility] failure) [facility]     Hyperlipidemia     Hypertension       Past Surgical History:   Procedure Laterality Date    BUNIONECTOMY Left 0786'L   Shelli Hughes     Dr. Navin Elder      x2 Dr. En Loredo      Dr. Kristian Clark Right 1960's    inguinal   Curlee Reddy      Dr. Raeann Larkin      Dr. Sloan Somers History   Problem Relation Age of Onset    Kidney Disease Mother     Cancer Father      Social History     Tobacco Use    Smoking status: Former Smoker     Packs/day: 0.50     Years: 15.00     Pack years: 7.50     Quit date: 1965     Years since quittin.7    Smokeless tobacco: Never Used   Substance Use Topics    Alcohol use: No      Current Outpatient Medications   Medication Sig Dispense Refill    rosuvastatin (CRESTOR) 5 MG tablet TAKE 1 TABLET NIGHTLY 90 tablet 1    finasteride (PROSCAR) 5 MG tablet Take 1 tablet by mouth daily 90 tablet 3    ELIQUIS 5 MG TABS tablet       hydroCHLOROthiazide (HYDRODIURIL) 25 MG tablet Take 1 tablet by mouth daily 90 tablet 1    terazosin (HYTRIN) 5 MG capsule Take 1 capsule by mouth nightly 90 capsule 1    baclofen (LIORESAL) 10 MG tablet Take 1 tablet by mouth 2 times daily as needed (muscle spasm) 180 tablet 1    famotidine (PEPCID) 20 MG tablet Take 1 tablet by mouth 2 times daily 180 tablet 1    mesalamine (ASACOL HD) 800 MG TBEC TBEC tablet Take 800 mg by mouth daily       captopril (CAPOTEN) 25 MG tablet Take 25 mg by mouth 2 times daily      carvedilol (COREG) 25 MG tablet Take 25 mg by mouth 2 times daily (with meals)      digoxin (LANOXIN) 125 MCG tablet Take 1 tablet by mouth daily. 90 tablet 1    gemfibrozil (LOPID) 600 MG tablet Take 1 tablet by mouth 2 times daily (before meals).  180 tablet 1    Coenzyme Q10 (COQ10) 200 MG CAPS Take 200 mg/day by mouth 2 times daily  180 capsule 1    Saw Palmetto, Serenoa repens, 450 MG CAPS Take 425 mg/day by mouth daily. 90 capsule 1     No current facility-administered medications for this visit. Allergies   Allergen Reactions    Latex Rash    Gluten Meal     Vasotec [Enalaprilat] Other (See Comments)     cough    Neosporin [Neomycin-Polymyxin-Gramicidin] Rash    Sulfa Antibiotics Rash    Voltaren [Diclofenac Sodium] Rash     gel     Health Maintenance   Topic Date Due    Shingles Vaccine (1 of 2) Never done    Flu vaccine (1) 09/01/2021    Annual Wellness Visit (AWV)  02/17/2022    Lipid screen  06/17/2022    Potassium monitoring  06/17/2022    Creatinine monitoring  06/17/2022    DTaP/Tdap/Td vaccine (2 - Td or Tdap) 06/09/2027    Pneumococcal 65+ years Vaccine  Completed    COVID-19 Vaccine  Completed    Hepatitis A vaccine  Aged Out    Hepatitis B vaccine  Aged Out    Hib vaccine  Aged Out    Meningococcal (ACWY) vaccine  Aged Out       Objective:  /78 (Site: Left Upper Arm, Position: Sitting)   Pulse 62   Temp 96.9 °F (36.1 °C)   Resp 18   Ht 6' 1\" (1.854 m)   Wt 195 lb 6.4 oz (88.6 kg)   SpO2 97%   BMI 25.78 kg/m²   Physical Exam  Vitals reviewed. Constitutional:       General: He is not in acute distress. Appearance: He is well-developed. Cardiovascular:      Rate and Rhythm: Normal rate and regular rhythm. Heart sounds: No murmur heard. Pulmonary:      Effort: Pulmonary effort is normal. No respiratory distress. Breath sounds: Normal breath sounds. No wheezing. Musculoskeletal:      Right lower leg: No edema. Left lower leg: No edema. Neurological:      Mental Status: He is alert. Mental status is at baseline.    Psychiatric:         Mood and Affect: Mood normal.         Behavior: Behavior normal.         Lab Results   Component Value Date    WBC 8.2 01/19/2021    HGB 14.9 01/19/2021    HCT 46.7 01/19/2021     01/19/2021    CHOL 129 06/17/2021    TRIG 90 06/17/2021    HDL 44 06/17/2021    ALT 6 (L) 06/17/2021    AST 17 06/17/2021     06/17/2021    K 4.0 06/17/2021     06/17/2021    CREATININE 0.7 06/17/2021    BUN 17 06/17/2021    CO2 24 06/17/2021    PSA 0.45 06/17/2021    INR 1.10 04/17/2018         Impression/Plan:  1. Essential hypertension  Chronic. Well-controlled. Continue hydrochlorothiazide, Hytrin, Coreg, and captopril  - terazosin (HYTRIN) 5 MG capsule; Take 1 capsule by mouth nightly  Dispense: 90 capsule; Refill: 1    2. ASHD (arteriosclerotic heart disease)  Chronic. Stable. continue statin. - rosuvastatin (CRESTOR) 5 MG tablet; Take 1 tablet by mouth daily  Dispense: 90 tablet; Refill: 1  - terazosin (HYTRIN) 5 MG capsule; Take 1 capsule by mouth nightly  Dispense: 90 capsule; Refill: 1  - hydroCHLOROthiazide (HYDRODIURIL) 25 MG tablet; Take 1 tablet by mouth daily  Dispense: 90 tablet; Refill: 1    3. Benign non-nodular prostatic hyperplasia without lower urinary tract symptoms  Chronic. Stable. Refill med  - terazosin (HYTRIN) 5 MG capsule; Take 1 capsule by mouth nightly  Dispense: 90 capsule; Refill: 1    4. Muscle spasm  Chronic. Controlled. Refill med  - baclofen (LIORESAL) 10 MG tablet; Take 1 tablet by mouth 2 times daily as needed (muscle spasm)  Dispense: 180 tablet; Refill: 1    5. Chronic superficial gastritis without bleeding  Chronic. Controlled. Continue Pepcid    6. Permanent atrial fibrillation (HCC)  Chronic. On Eliquis. Continue digoxin and beta-blocker    7. Localized osteoarthritis of right knee  Chronic. Worsening. We will plan to repeat steroid injection in September as he wants his knee to be good for a wedding in October        They voiced understanding. All questions answered. They agreed with treatment plan. See patient instructions for any educational materials that may have been given. Discussed use, benefit, and side effects of prescribed medications.      Reviewed health maintenance. (Please note that portions of this note may have been completed with a voice recognition program.  Efforts were made to edit the dictation but occasionally words are mis-transcribed.)    Return in about 23 days (around 9/8/2021) for right knee procedure injection; 6 month f/u medicare wellness.       Electronically signed by Asa Owens MD on 8/16/2021 at 10:15 AM

## 2021-09-08 ENCOUNTER — PROCEDURE VISIT (OUTPATIENT)
Dept: FAMILY MEDICINE CLINIC | Age: 81
End: 2021-09-08
Payer: MEDICARE

## 2021-09-08 VITALS
BODY MASS INDEX: 25.45 KG/M2 | RESPIRATION RATE: 14 BRPM | DIASTOLIC BLOOD PRESSURE: 68 MMHG | TEMPERATURE: 97.4 F | SYSTOLIC BLOOD PRESSURE: 124 MMHG | HEIGHT: 73 IN | WEIGHT: 192 LBS | OXYGEN SATURATION: 96 % | HEART RATE: 60 BPM

## 2021-09-08 DIAGNOSIS — M25.561 CHRONIC PAIN OF RIGHT KNEE: ICD-10-CM

## 2021-09-08 DIAGNOSIS — G89.29 CHRONIC PAIN OF RIGHT KNEE: ICD-10-CM

## 2021-09-08 DIAGNOSIS — M17.11 LOCALIZED OSTEOARTHRITIS OF RIGHT KNEE: Primary | ICD-10-CM

## 2021-09-08 PROCEDURE — 20610 DRAIN/INJ JOINT/BURSA W/O US: CPT | Performed by: FAMILY MEDICINE

## 2021-09-08 RX ORDER — TRIAMCINOLONE ACETONIDE 40 MG/ML
80 INJECTION, SUSPENSION INTRA-ARTICULAR; INTRAMUSCULAR ONCE
Status: COMPLETED | OUTPATIENT
Start: 2021-09-08 | End: 2021-09-08

## 2021-09-08 RX ORDER — FAMOTIDINE 40 MG/1
TABLET, FILM COATED ORAL
COMMUNITY
Start: 2021-09-06 | End: 2021-09-08

## 2021-09-08 RX ORDER — LIDOCAINE HYDROCHLORIDE 10 MG/ML
6 INJECTION, SOLUTION INFILTRATION; PERINEURAL ONCE
Status: COMPLETED | OUTPATIENT
Start: 2021-09-08 | End: 2021-09-08

## 2021-09-08 RX ADMIN — TRIAMCINOLONE ACETONIDE 80 MG: 40 INJECTION, SUSPENSION INTRA-ARTICULAR; INTRAMUSCULAR at 10:34

## 2021-09-08 RX ADMIN — LIDOCAINE HYDROCHLORIDE 6 ML: 10 INJECTION, SOLUTION INFILTRATION; PERINEURAL at 10:33

## 2021-09-08 ASSESSMENT — PATIENT HEALTH QUESTIONNAIRE - PHQ9
2. FEELING DOWN, DEPRESSED OR HOPELESS: 0
SUM OF ALL RESPONSES TO PHQ QUESTIONS 1-9: 0
SUM OF ALL RESPONSES TO PHQ9 QUESTIONS 1 & 2: 0
SUM OF ALL RESPONSES TO PHQ QUESTIONS 1-9: 0
SUM OF ALL RESPONSES TO PHQ QUESTIONS 1-9: 0
1. LITTLE INTEREST OR PLEASURE IN DOING THINGS: 0

## 2021-09-08 NOTE — PROGRESS NOTES
Procedure Note Right Knee Injection    Diagnosis: OA    Discussed risks and benefits of steroid injection, including but not limited to infection, skin discoloration, pain, and bleeding. With the patient's verbal informed consent, his right knee was prepped  in standard sterile fashion with Betadine and Alcohol. Ethyl chloride was used to anesthetize the skin. A mixture of 6 cc of 1% Lidocaine and 2 cc of Kenalog 40 mg was injected into the right anterior lateral joint space. The patient tolerated this well without difficulty. A band-aid was applied and the patient was advised to ice the knee for 15-20 minutes to relieve any injection site related pain. Follow Up: As Directed.     Rocio Mckenzie MD

## 2021-10-22 ENCOUNTER — NURSE ONLY (OUTPATIENT)
Dept: FAMILY MEDICINE CLINIC | Age: 81
End: 2021-10-22
Payer: MEDICARE

## 2021-10-22 DIAGNOSIS — Z23 NEED FOR INFLUENZA VACCINATION: Primary | ICD-10-CM

## 2021-10-22 PROCEDURE — 99999 PR OFFICE/OUTPT VISIT,PROCEDURE ONLY: CPT | Performed by: FAMILY MEDICINE

## 2021-10-22 PROCEDURE — G0008 ADMIN INFLUENZA VIRUS VAC: HCPCS | Performed by: FAMILY MEDICINE

## 2021-10-22 PROCEDURE — 90694 VACC AIIV4 NO PRSRV 0.5ML IM: CPT | Performed by: FAMILY MEDICINE

## 2021-10-22 NOTE — PROGRESS NOTES
After obtaining consent, and per orders of Veda Almaraz MD  Immunization(s) and/or medication(s) given during visit by Rissa Bueno, [address] [place] [address]              Immunizations Administered     Name Date Dose Route    Influenza, Quadv, adjuvanted, 65 yrs +, IM, PF (Fluad) 10/22/2021 0.5 mL Intramuscular    Site: Deltoid- Left    Lot: [id number]    NDC: 61515-238-29

## 2022-02-17 ENCOUNTER — HOSPITAL ENCOUNTER (OUTPATIENT)
Age: 82
Discharge: HOME | End: 2022-02-17
Payer: MEDICARE

## 2022-02-17 LAB
ANION GAP SERPL CALCULATED.3IONS-SCNC: 15 MEQ/L (ref 8–16)
BUN BLDV-MCNC: 13 MG/DL (ref 7–22)
CALCIUM SERPL-MCNC: 9.7 MG/DL (ref 8.5–10.5)
CHLORIDE BLD-SCNC: 101 MEQ/L (ref 98–111)
CO2: 21 MEQ/L (ref 23–33)
CREAT SERPL-MCNC: 0.6 MG/DL (ref 0.4–1.2)
ERYTHROCYTE [DISTWIDTH] IN BLOOD BY AUTOMATED COUNT: 14.9 % (ref 11.5–14.5)
ERYTHROCYTE [DISTWIDTH] IN BLOOD BY AUTOMATED COUNT: 51.7 FL (ref 35–45)
GFR SERPL CREATININE-BSD FRML MDRD: > 90 ML/MIN/1.73M2
GLUCOSE BLD-MCNC: 103 MG/DL (ref 70–108)
HCT VFR BLD CALC: 42.7 % (ref 42–52)
HEMOGLOBIN: 13.7 GM/DL (ref 14–18)
MCH RBC QN AUTO: 30 PG (ref 26–33)
MCHC RBC AUTO-ENTMCNC: 32.1 GM/DL (ref 32.2–35.5)
MCV RBC AUTO: 93.6 FL (ref 80–94)
PLATELET # BLD: 207 THOU/MM3 (ref 130–400)
PMV BLD AUTO: 10.2 FL (ref 9.4–12.4)
POTASSIUM SERPL-SCNC: 4.1 MEQ/L (ref 3.5–5.2)
RBC # BLD: 4.56 MILL/MM3 (ref 4.7–6.1)
SODIUM BLD-SCNC: 137 MEQ/L (ref 135–145)
WBC # BLD: 7.4 THOU/MM3 (ref 4.8–10.8)

## 2022-02-17 PROCEDURE — 36415 COLL VENOUS BLD VENIPUNCTURE: CPT

## 2022-02-17 PROCEDURE — 85027 COMPLETE CBC AUTOMATED: CPT

## 2022-02-17 PROCEDURE — 80048 BASIC METABOLIC PNL TOTAL CA: CPT

## 2022-02-21 ENCOUNTER — OFFICE VISIT (OUTPATIENT)
Dept: FAMILY MEDICINE CLINIC | Age: 82
End: 2022-02-21
Payer: MEDICARE

## 2022-02-21 VITALS
TEMPERATURE: 96.7 F | WEIGHT: 181 LBS | RESPIRATION RATE: 18 BRPM | HEIGHT: 73 IN | BODY MASS INDEX: 23.99 KG/M2 | OXYGEN SATURATION: 98 % | HEART RATE: 60 BPM | SYSTOLIC BLOOD PRESSURE: 120 MMHG | DIASTOLIC BLOOD PRESSURE: 64 MMHG

## 2022-02-21 DIAGNOSIS — I25.10 ASHD (ARTERIOSCLEROTIC HEART DISEASE): ICD-10-CM

## 2022-02-21 DIAGNOSIS — N40.0 BENIGN NON-NODULAR PROSTATIC HYPERPLASIA WITHOUT LOWER URINARY TRACT SYMPTOMS: ICD-10-CM

## 2022-02-21 DIAGNOSIS — M62.838 MUSCLE SPASM: ICD-10-CM

## 2022-02-21 DIAGNOSIS — I10 ESSENTIAL HYPERTENSION: ICD-10-CM

## 2022-02-21 DIAGNOSIS — Z00.00 MEDICARE ANNUAL WELLNESS VISIT, SUBSEQUENT: Primary | ICD-10-CM

## 2022-02-21 PROCEDURE — G0439 PPPS, SUBSEQ VISIT: HCPCS | Performed by: FAMILY MEDICINE

## 2022-02-21 RX ORDER — BACLOFEN 10 MG/1
10 TABLET ORAL 2 TIMES DAILY PRN
Qty: 180 TABLET | Refills: 1 | Status: SHIPPED | OUTPATIENT
Start: 2022-02-21 | End: 2022-08-22 | Stop reason: SDUPTHER

## 2022-02-21 RX ORDER — ROSUVASTATIN CALCIUM 5 MG/1
5 TABLET, COATED ORAL DAILY
Qty: 90 TABLET | Refills: 1 | Status: SHIPPED | OUTPATIENT
Start: 2022-02-21 | End: 2022-08-22 | Stop reason: SDUPTHER

## 2022-02-21 RX ORDER — TERAZOSIN 5 MG/1
5 CAPSULE ORAL NIGHTLY
Qty: 90 CAPSULE | Refills: 1 | Status: SHIPPED | OUTPATIENT
Start: 2022-02-21 | End: 2022-08-22 | Stop reason: SDUPTHER

## 2022-02-21 RX ORDER — HYDROCHLOROTHIAZIDE 25 MG/1
25 TABLET ORAL DAILY
Qty: 90 TABLET | Refills: 1 | Status: SHIPPED | OUTPATIENT
Start: 2022-02-21 | End: 2022-08-22 | Stop reason: SDUPTHER

## 2022-02-21 ASSESSMENT — PATIENT HEALTH QUESTIONNAIRE - PHQ9
SUM OF ALL RESPONSES TO PHQ QUESTIONS 1-9: 0
SUM OF ALL RESPONSES TO PHQ QUESTIONS 1-9: 0
1. LITTLE INTEREST OR PLEASURE IN DOING THINGS: 0
SUM OF ALL RESPONSES TO PHQ QUESTIONS 1-9: 0
2. FEELING DOWN, DEPRESSED OR HOPELESS: 0
SUM OF ALL RESPONSES TO PHQ9 QUESTIONS 1 & 2: 0
SUM OF ALL RESPONSES TO PHQ QUESTIONS 1-9: 0

## 2022-02-21 ASSESSMENT — LIFESTYLE VARIABLES: HOW OFTEN DO YOU HAVE A DRINK CONTAINING ALCOHOL: NEVER

## 2022-02-21 NOTE — PROGRESS NOTES
Medicare Annual Wellness Visit    Hayder Collado is here for Medicare AWV and Hypertension    Assessment & Plan   Nayla Pavon was seen today for medicare awv and hypertension. Diagnoses and all orders for this visit:    Medicare annual wellness visit, subsequent    ASHD (arteriosclerotic heart disease)  -     rosuvastatin (CRESTOR) 5 MG tablet; Take 1 tablet by mouth daily  -     terazosin (HYTRIN) 5 MG capsule; Take 1 capsule by mouth nightly  -     hydroCHLOROthiazide (HYDRODIURIL) 25 MG tablet; Take 1 tablet by mouth daily    Benign non-nodular prostatic hyperplasia without lower urinary tract symptoms  -     terazosin (HYTRIN) 5 MG capsule; Take 1 capsule by mouth nightly    Essential hypertension  -     terazosin (HYTRIN) 5 MG capsule; Take 1 capsule by mouth nightly    Muscle spasm  -     baclofen (LIORESAL) 10 MG tablet; Take 1 tablet by mouth 2 times daily as needed (muscle spasm)              Meds refilled as listed above without changes. Chronic conditions are considered controlled    Recommendations for Preventive Services Due: see orders and patient instructions/AVS.  Recommended screening schedule for the next 5-10 years is provided to the patient in written form: see Patient Instructions/AVS.     Return in about 6 months (around 8/21/2022) for HTN. Reviewed and updated this visit by clinical staff:  Tobacco  Allergies  Meds  Problems  Med Hx  Surg Hx  Soc Hx  Fam Hx        Subjective       Patient's complete Health Risk Assessment and screening values have been reviewed and are found in Flowsheets. The following problems were reviewed today and where indicated follow up appointments were made and/or referrals ordered.     Positive Risk Factor Screenings with Interventions:              Health Habits/Nutrition:     Physical Activity: Insufficiently Active    Days of Exercise per Week: 3 days    Minutes of Exercise per Session: 20 min     Have you lost any weight without trying in the past 3 months?: (!) Yes    Body mass index: 23.88    Have you seen the dentist within the past year?: Yes      Health Habits/Nutrition Interventions:  · Nutritional issues:  he tried decreasing glucose levels to lose weight given his elevated glucose          Objective         Physical Exam  Vitals reviewed. Constitutional:       General: He is not in acute distress. Appearance: He is well-developed. Cardiovascular:      Rate and Rhythm: Normal rate and regular rhythm. Heart sounds: No murmur heard. Pulmonary:      Effort: Pulmonary effort is normal. No respiratory distress. Breath sounds: Normal breath sounds. No wheezing. Musculoskeletal:      Right lower leg: No edema. Left lower leg: No edema. Neurological:      Mental Status: He is alert. Mental status is at baseline. Psychiatric:         Mood and Affect: Mood normal.         Behavior: Behavior normal.             Allergies   Allergen Reactions    Latex Rash    Gluten Meal     Vasotec [Enalaprilat] Other (See Comments)     cough    Neosporin [Neomycin-Polymyxin-Gramicidin] Rash    Sulfa Antibiotics Rash    Voltaren [Diclofenac Sodium] Rash     gel     Prior to Visit Medications    Medication Sig Taking?  Authorizing Provider   rosuvastatin (CRESTOR) 5 MG tablet Take 1 tablet by mouth daily Yes eMna Castrejon MD   terazosin (HYTRIN) 5 MG capsule Take 1 capsule by mouth nightly Yes Mena Castrejon MD   hydroCHLOROthiazide (HYDRODIURIL) 25 MG tablet Take 1 tablet by mouth daily Yes Mena Castrejon MD   baclofen (LIORESAL) 10 MG tablet Take 1 tablet by mouth 2 times daily as needed (muscle spasm) Yes Mena Castrejon MD   finasteride (PROSCAR) 5 MG tablet Take 1 tablet by mouth daily Yes SID Camacho - CNP   ELIQUIS 5 MG TABS tablet  Yes Historical Provider, MD   famotidine (PEPCID) 20 MG tablet Take 1 tablet by mouth 2 times daily Yes Mena Castrejon MD   mesalamine (ASACOL HD) 800 MG TBEC TBEC tablet Take 800 mg by

## 2022-03-03 LAB
ECG PR INTERVAL: NORMAL MS
ECG QTC INTERVAL: NORMAL MS
EKG QRS AXIS: NORMAL DEG
EKG QRS INTERVAL: 232 MS
HEART RATE: 63 BPM

## 2022-03-08 ENCOUNTER — PRE-PROCEDURE TELEPHONE (OUTPATIENT)
Dept: INPATIENT UNIT | Age: 82
End: 2022-03-08

## 2022-03-09 ENCOUNTER — HOSPITAL ENCOUNTER (OUTPATIENT)
Dept: INPATIENT UNIT | Age: 82
Discharge: HOME | End: 2022-03-09
Attending: INTERNAL MEDICINE | Admitting: INTERNAL MEDICINE
Payer: MEDICARE

## 2022-03-09 ENCOUNTER — APPOINTMENT (OUTPATIENT)
Dept: CARDIAC CATH/INVASIVE PROCEDURES | Age: 82
End: 2022-03-09
Payer: MEDICARE

## 2022-03-09 VITALS
HEIGHT: 73 IN | BODY MASS INDEX: 23.86 KG/M2 | SYSTOLIC BLOOD PRESSURE: 131 MMHG | HEART RATE: 60 BPM | RESPIRATION RATE: 17 BRPM | OXYGEN SATURATION: 97 % | WEIGHT: 180 LBS | DIASTOLIC BLOOD PRESSURE: 56 MMHG | TEMPERATURE: 97.8 F

## 2022-03-09 LAB
ABO: NORMAL
ALBUMIN SERPL-MCNC: 4.8 G/DL (ref 3.5–5.1)
ALP BLD-CCNC: 62 U/L (ref 38–126)
ALT SERPL-CCNC: 6 U/L (ref 11–66)
ANION GAP SERPL CALCULATED.3IONS-SCNC: 13 MEQ/L (ref 8–16)
ANTIBODY SCREEN: NORMAL
AST SERPL-CCNC: 19 U/L (ref 5–40)
BILIRUB SERPL-MCNC: 1.2 MG/DL (ref 0.3–1.2)
BUN BLDV-MCNC: 16 MG/DL (ref 7–22)
CALCIUM SERPL-MCNC: 10.8 MG/DL (ref 8.5–10.5)
CHLORIDE BLD-SCNC: 101 MEQ/L (ref 98–111)
CHOLESTEROL, TOTAL: 121 MG/DL (ref 100–199)
CO2: 21 MEQ/L (ref 23–33)
CREAT SERPL-MCNC: 0.6 MG/DL (ref 0.4–1.2)
EKG ATRIAL RATE: 46 BPM
EKG Q-T INTERVAL: 502 MS
EKG QRS DURATION: 214 MS
EKG QTC CALCULATION (BAZETT): 502 MS
EKG R AXIS: -80 DEGREES
EKG T AXIS: 95 DEGREES
EKG VENTRICULAR RATE: 60 BPM
ERYTHROCYTE [DISTWIDTH] IN BLOOD BY AUTOMATED COUNT: 14.5 % (ref 11.5–14.5)
ERYTHROCYTE [DISTWIDTH] IN BLOOD BY AUTOMATED COUNT: 48.3 FL (ref 35–45)
GFR SERPL CREATININE-BSD FRML MDRD: > 90 ML/MIN/1.73M2
GLUCOSE BLD-MCNC: 118 MG/DL (ref 70–108)
HCT VFR BLD CALC: 42.1 % (ref 42–52)
HDLC SERPL-MCNC: 55 MG/DL
HEMOGLOBIN: 13.8 GM/DL (ref 14–18)
INR BLD: 1.16 (ref 0.85–1.13)
LDL CHOLESTEROL CALCULATED: 53 MG/DL
MCH RBC QN AUTO: 30.3 PG (ref 26–33)
MCHC RBC AUTO-ENTMCNC: 32.8 GM/DL (ref 32.2–35.5)
MCV RBC AUTO: 92.3 FL (ref 80–94)
PLATELET # BLD: 257 THOU/MM3 (ref 130–400)
PMV BLD AUTO: 9.6 FL (ref 9.4–12.4)
POTASSIUM REFLEX MAGNESIUM: 4.5 MEQ/L (ref 3.5–5.2)
RBC # BLD: 4.56 MILL/MM3 (ref 4.7–6.1)
RH FACTOR: NORMAL
SODIUM BLD-SCNC: 135 MEQ/L (ref 135–145)
TOTAL PROTEIN: 7.9 G/DL (ref 6.1–8)
TRIGL SERPL-MCNC: 65 MG/DL (ref 0–199)
WBC # BLD: 9.6 THOU/MM3 (ref 4.8–10.8)

## 2022-03-09 PROCEDURE — 36415 COLL VENOUS BLD VENIPUNCTURE: CPT

## 2022-03-09 PROCEDURE — 85610 PROTHROMBIN TIME: CPT

## 2022-03-09 PROCEDURE — 86850 RBC ANTIBODY SCREEN: CPT

## 2022-03-09 PROCEDURE — 80053 COMPREHEN METABOLIC PANEL: CPT

## 2022-03-09 PROCEDURE — C1894 INTRO/SHEATH, NON-LASER: HCPCS

## 2022-03-09 PROCEDURE — 86900 BLOOD TYPING SEROLOGIC ABO: CPT

## 2022-03-09 PROCEDURE — 2500000003 HC RX 250 WO HCPCS

## 2022-03-09 PROCEDURE — 2580000003 HC RX 258: Performed by: INTERNAL MEDICINE

## 2022-03-09 PROCEDURE — 86901 BLOOD TYPING SEROLOGIC RH(D): CPT

## 2022-03-09 PROCEDURE — 93005 ELECTROCARDIOGRAM TRACING: CPT | Performed by: INTERNAL MEDICINE

## 2022-03-09 PROCEDURE — 93010 ELECTROCARDIOGRAM REPORT: CPT | Performed by: NUCLEAR MEDICINE

## 2022-03-09 PROCEDURE — 85027 COMPLETE CBC AUTOMATED: CPT

## 2022-03-09 PROCEDURE — 6360000002 HC RX W HCPCS

## 2022-03-09 PROCEDURE — 93454 CORONARY ARTERY ANGIO S&I: CPT

## 2022-03-09 PROCEDURE — 93458 L HRT ARTERY/VENTRICLE ANGIO: CPT

## 2022-03-09 PROCEDURE — 6360000004 HC RX CONTRAST MEDICATION: Performed by: INTERNAL MEDICINE

## 2022-03-09 PROCEDURE — C1769 GUIDE WIRE: HCPCS

## 2022-03-09 PROCEDURE — 6370000000 HC RX 637 (ALT 250 FOR IP): Performed by: INTERNAL MEDICINE

## 2022-03-09 PROCEDURE — 80061 LIPID PANEL: CPT

## 2022-03-09 PROCEDURE — C1887 CATHETER, GUIDING: HCPCS

## 2022-03-09 RX ORDER — SODIUM CHLORIDE 9 MG/ML
100 INJECTION, SOLUTION INTRAVENOUS CONTINUOUS
Status: ACTIVE | OUTPATIENT
Start: 2022-03-09 | End: 2022-03-09

## 2022-03-09 RX ORDER — ACETAMINOPHEN 325 MG/1
650 TABLET ORAL EVERY 4 HOURS PRN
Status: DISCONTINUED | OUTPATIENT
Start: 2022-03-09 | End: 2022-03-09 | Stop reason: HOSPADM

## 2022-03-09 RX ORDER — SODIUM CHLORIDE 9 MG/ML
INJECTION, SOLUTION INTRAVENOUS CONTINUOUS
Status: DISCONTINUED | OUTPATIENT
Start: 2022-03-09 | End: 2022-03-09 | Stop reason: ALTCHOICE

## 2022-03-09 RX ORDER — FAMOTIDINE 40 MG/1
40 TABLET, FILM COATED ORAL 2 TIMES DAILY
COMMUNITY

## 2022-03-09 RX ORDER — AMLODIPINE BESYLATE 5 MG/1
5 TABLET ORAL DAILY
COMMUNITY
End: 2022-04-27 | Stop reason: SDUPTHER

## 2022-03-09 RX ORDER — SODIUM CHLORIDE 0.9 % (FLUSH) 0.9 %
5-40 SYRINGE (ML) INJECTION EVERY 12 HOURS SCHEDULED
Status: DISCONTINUED | OUTPATIENT
Start: 2022-03-09 | End: 2022-03-09 | Stop reason: HOSPADM

## 2022-03-09 RX ORDER — ASPIRIN 325 MG
325 TABLET ORAL ONCE
Status: COMPLETED | OUTPATIENT
Start: 2022-03-09 | End: 2022-03-09

## 2022-03-09 RX ORDER — DIPHENHYDRAMINE HCL 25 MG
50 TABLET ORAL ONCE
Status: DISCONTINUED | OUTPATIENT
Start: 2022-03-09 | End: 2022-03-09

## 2022-03-09 RX ORDER — SODIUM CHLORIDE 0.9 % (FLUSH) 0.9 %
5-40 SYRINGE (ML) INJECTION PRN
Status: DISCONTINUED | OUTPATIENT
Start: 2022-03-09 | End: 2022-03-09 | Stop reason: HOSPADM

## 2022-03-09 RX ORDER — ATROPINE SULFATE 0.4 MG/ML
0.5 AMPUL (ML) INJECTION
Status: DISCONTINUED | OUTPATIENT
Start: 2022-03-09 | End: 2022-03-09 | Stop reason: HOSPADM

## 2022-03-09 RX ORDER — ONDANSETRON 2 MG/ML
4 INJECTION INTRAMUSCULAR; INTRAVENOUS EVERY 6 HOURS PRN
Status: DISCONTINUED | OUTPATIENT
Start: 2022-03-09 | End: 2022-03-09 | Stop reason: HOSPADM

## 2022-03-09 RX ORDER — SODIUM CHLORIDE 9 MG/ML
25 INJECTION, SOLUTION INTRAVENOUS PRN
Status: DISCONTINUED | OUTPATIENT
Start: 2022-03-09 | End: 2022-03-09 | Stop reason: HOSPADM

## 2022-03-09 RX ADMIN — ACETAMINOPHEN 650 MG: 325 TABLET ORAL at 09:19

## 2022-03-09 RX ADMIN — IOPAMIDOL 80 ML: 755 INJECTION, SOLUTION INTRAVENOUS at 08:40

## 2022-03-09 RX ADMIN — ASPIRIN 325 MG: 325 TABLET ORAL at 07:05

## 2022-03-09 RX ADMIN — SODIUM CHLORIDE: 9 INJECTION, SOLUTION INTRAVENOUS at 07:00

## 2022-03-09 ASSESSMENT — PAIN SCALES - GENERAL: PAINLEVEL_OUTOF10: 5

## 2022-03-09 NOTE — PROGRESS NOTES
Pt admitted to  2E02 ambulatory for left heart cath . Pt NPO. Patient accompanied by spouse and daughter, Pham Ramirez . Vital signs obtained. Assessment and data collection initiated. Oriented to room. Policies and procedures for 2E explained   All questions answered with no further questions at this time. Fall prevention and safety precautions discussed with patient. Care plan reviewed with patient and family. Patient and family verbalize understanding of the plan of care and contribute to goal setting.     0740 to procedure per bed

## 2022-03-09 NOTE — FLOWSHEET NOTE
Final air removed from vasc band   Site looks real good with no bleeding or hematoma   Small amount bruising

## 2022-03-09 NOTE — FLOWSHEET NOTE
Received in Cath Recovery post procedure. Report received from Cath Lab. Vasc band intact over right radial artery. No signs of bleeding or swelling at site. Hemostasis maintained. Armboard in place. See Post Cath Assessment flowsheet for assessments and vital signs. Heart monitor showing paced rhythm . IV 1000 0.9 NS infusing at 100 ml per hour in right hand.   Dr Lorena Neal in to see pt and family

## 2022-03-09 NOTE — PLAN OF CARE
Discharge instructions given to pt and spouse and daughter .   All \"voiced understanding\"     \"ready to go home\"

## 2022-03-09 NOTE — OP NOTE
Operative Note      Patient: [facility]  Sedation/Analgesia Post Sedation Record      Pt Name: Lorena Sandoval  MRN: [id number]  YOB: 1940  Procedure Performed By: Ish Meneses MD, MD  Primary Care Physician: Anita Hardy MD    POST-PROCEDURE    Physician: Ish Meneses MD, MD    Procedure Performed:  Left Heart Cath    Sedation/Anesthesia:  Local Anesthesia and IV Conscious Sedation with continuous O2 monitoring    Estimated Blood Loss:  Minimal    Specimens Removed:  None    Complications:  None     Post Procedure Diagnosis/Findings:  Coronary Artery Disease    Recommendations:  Medical treatment and review films.        Ish Meneses MD, MD  Electronically signed 3/9/2022 at 8:40 AM

## 2022-03-09 NOTE — H&P
mouth daily   Yes Historical Provider, MD   rosuvastatin (CRESTOR) 5 MG tablet Take 1 tablet by mouth daily 2/21/22  Yes Ailyn Henry MD   terazosin (HYTRIN) 5 MG capsule Take 1 capsule by mouth nightly 2/21/22  Yes Ailyn Henry MD   hydroCHLOROthiazide (HYDRODIURIL) 25 MG tablet Take 1 tablet by mouth daily 2/21/22  Yes Ailyn Henry MD   baclofen (LIORESAL) 10 MG tablet Take 1 tablet by mouth 2 times daily as needed (muscle spasm) 2/21/22  Yes Ailyn Henry MD   finasteride (PROSCAR) 5 MG tablet Take 1 tablet by mouth daily 4/13/21 4/13/22 Yes SID Camacho - CNP   mesalamine (ASACOL HD) 800 MG TBEC TBEC tablet Take 800 mg by mouth daily    Yes Historical Provider, MD   captopril (CAPOTEN) 25 MG tablet Take 12.5 mg by mouth 2 times daily    Yes Historical Provider, MD   carvedilol (COREG) 25 MG tablet Take 25 mg by mouth 2 times daily (with meals)   Yes Historical Provider, MD   digoxin (LANOXIN) 125 MCG tablet Take 1 tablet by mouth daily. 9/25/14  Yes Jenn Bah MD   gemfibrozil (LOPID) 600 MG tablet Take 1 tablet by mouth 2 times daily (before meals). 9/25/14  Yes Jenn Bah MD   Coenzyme Q10 (COQ10) 200 MG CAPS Take 200 mg/day by mouth 2 times daily  8/24/17  Yes MD Mookie Wong Palmetto, Serenoa repens, 450 MG CAPS Take 425 mg/day by mouth daily. 9/25/14  Yes Jenn Bah MD   ELIQUIS 5 MG TABS tablet 2 times daily  4/2/21   Historical Provider, MD       Vital Signs  There were no vitals filed for this visit.     Physical:  Heart:  regular rate and rhythm  Lungs:  Clear  Abdomen:  Soft  Mental Status:  Alert and Oriented    Planned Procedure:  Left Heart Cath and Possible Percutaneous Coronary Intervention    Sedation/ Anesthesia Plan: Midazolam and Sublimaze    ASA Classification: Class 3 - A patient with severe systemic disease that limits activity but is not incapacitating    Mallampati Airway Classification: II (soft palate, uvula, fauces visible)    · Pre-procedure diagnostic studies complete and results available. · Previous sedation/anesthesia experiences assessed. · The patient is an appropriate candidate to undergo the planned procedure sedation and anesthesia. (Refer to nursing sedation/analgesia documentation record)  · Formulation and discussion of sedation/procedure plan, risks, and expectations with patient and/or responsible adult completed. · Patient examined immediately prior to the procedure.  (Refer to nursing sedation/analgesia documentation record)    Caroline Vivra MD, MD  Electronically signed 3/9/2022 at 7:05 AM  Patient Name: Kin Askeweper Record Number: [id number]  Date: 3/9/2022   Time: 7:05 AM   Room/Bed: 2E-02/002-A

## 2022-03-09 NOTE — FLOWSHEET NOTE
2ml air with drawn from vasc band successful   Have been trying every 15 minutes prior with bleeding at site   No bleeding or hematoma noted now

## 2022-04-12 ENCOUNTER — OFFICE VISIT (OUTPATIENT)
Dept: UROLOGY | Age: 82
End: 2022-04-12
Payer: MEDICARE

## 2022-04-12 VITALS
SYSTOLIC BLOOD PRESSURE: 116 MMHG | HEIGHT: 73 IN | DIASTOLIC BLOOD PRESSURE: 76 MMHG | BODY MASS INDEX: 23.59 KG/M2 | HEART RATE: 66 BPM | WEIGHT: 178 LBS

## 2022-04-12 DIAGNOSIS — N40.1 BENIGN PROSTATIC HYPERPLASIA WITH LOWER URINARY TRACT SYMPTOMS, SYMPTOM DETAILS UNSPECIFIED: Primary | ICD-10-CM

## 2022-04-12 LAB
BILIRUBIN URINE: NEGATIVE
BLOOD URINE, POC: NEGATIVE
CHARACTER, URINE: CLEAR
COLOR, URINE: YELLOW
GLUCOSE URINE: NEGATIVE MG/DL
KETONES, URINE: NEGATIVE
LEUKOCYTE CLUMPS, URINE: NEGATIVE
NITRITE, URINE: NEGATIVE
PH, URINE: 7 (ref 5–9)
POST VOID RESIDUAL (PVR): 99 ML
PROTEIN, URINE: NEGATIVE MG/DL
SPECIFIC GRAVITY, URINE: 1.02 (ref 1–1.03)
UROBILINOGEN, URINE: 1 EU/DL (ref 0–1)

## 2022-04-12 PROCEDURE — 51798 US URINE CAPACITY MEASURE: CPT | Performed by: NURSE PRACTITIONER

## 2022-04-12 PROCEDURE — 81003 URINALYSIS AUTO W/O SCOPE: CPT | Performed by: NURSE PRACTITIONER

## 2022-04-12 PROCEDURE — 99213 OFFICE O/P EST LOW 20 MIN: CPT | Performed by: NURSE PRACTITIONER

## 2022-04-12 RX ORDER — FINASTERIDE 5 MG/1
5 TABLET, FILM COATED ORAL DAILY
Qty: 90 TABLET | Refills: 3 | Status: SHIPPED | OUTPATIENT
Start: 2022-04-12 | End: 2023-04-12

## 2022-04-12 ASSESSMENT — ENCOUNTER SYMPTOMS
NAUSEA: 0
BACK PAIN: 0
VOMITING: 0
ABDOMINAL PAIN: 0

## 2022-04-12 NOTE — PROGRESS NOTES
[address] [address]  Dept: [phone]  Loc: [phone]    Visit Date: 4/12/2022        HPI:     Julienne Guerrero is a 79 y.o. male who presents today for:  Chief Complaint   Patient presents with    Follow-up     bph with luts       HPI   Pt seen in follow up for BPH. Mr. Lovely Beckett has a hx of BPH for which he is on finasteride and hytrin 5 mg nightly. Prior cystoscopy 4/23/18 by Dr. Estella Lott with findings of markedly obstructing prostate. Last PSA 6/17/21 0.45. Reports urinary symptoms controlled on current medication therapy. Nocturia 2 x per night. Some hesitation but overall satisfied with urinary symptoms. Current Outpatient Medications   Medication Sig Dispense Refill    finasteride (PROSCAR) 5 MG tablet Take 1 tablet by mouth daily 90 tablet 3    famotidine (PEPCID) 40 MG tablet Take 40 mg by mouth 2 times daily      amLODIPine (NORVASC) 5 MG tablet Take 5 mg by mouth daily      rosuvastatin (CRESTOR) 5 MG tablet Take 1 tablet by mouth daily 90 tablet 1    terazosin (HYTRIN) 5 MG capsule Take 1 capsule by mouth nightly 90 capsule 1    hydroCHLOROthiazide (HYDRODIURIL) 25 MG tablet Take 1 tablet by mouth daily 90 tablet 1    baclofen (LIORESAL) 10 MG tablet Take 1 tablet by mouth 2 times daily as needed (muscle spasm) 180 tablet 1    ELIQUIS 5 MG TABS tablet 2 times daily       mesalamine (ASACOL HD) 800 MG TBEC TBEC tablet Take 800 mg by mouth daily       captopril (CAPOTEN) 25 MG tablet Take 12.5 mg by mouth 2 times daily       carvedilol (COREG) 25 MG tablet Take 25 mg by mouth 2 times daily (with meals)      digoxin (LANOXIN) 125 MCG tablet Take 1 tablet by mouth daily. 90 tablet 1    gemfibrozil (LOPID) 600 MG tablet Take 1 tablet by mouth 2 times daily (before meals).  180 tablet 1    Coenzyme Q10 (COQ10) 200 MG CAPS Take 200 mg/day by mouth 2 times daily  180 capsule 1  Saw Palmetto, Serenoa repens, 450 MG CAPS Take 425 mg/day by mouth daily. 90 capsule 1     No current facility-administered medications for this visit. Past Medical History  Niru Connolly  has a past medical history of Arthritis, Atrial fibrillation ([facility] CAD (coronary artery disease), CHF (congestive heart failure) ([facility] GERD (gastroesophageal reflux disease), Hyperlipidemia, and Hypertension. Past Surgical History  The patient  has a past surgical history that includes Pacemaker insertion (2008); Bunionectomy (Left, 1990's); Pilonidal cyst drainage (1970); hernia repair (Right, 1960's); Cardiac catheterization; Cataract removal with implant (Left, 2015); Colonoscopy; Colonoscopy (2016); Upper gastrointestinal endoscopy (2016); and pacemaker placement. Family History  This patient's family history includes Cancer in his father; Kidney Disease in his mother. Social History  Niru Connolly  reports that he quit smoking about 57 years ago. He has a 7.50 pack-year smoking history. He has never used smokeless tobacco. He reports that he does not drink alcohol and does not use drugs. Subjective:      Review of Systems   Constitutional: Negative for activity change, appetite change, chills, diaphoresis, fatigue, fever and unexpected weight change. Gastrointestinal: Negative for abdominal pain, nausea and vomiting. Genitourinary: Negative for decreased urine volume, difficulty urinating, dysuria, flank pain, frequency, hematuria and urgency. Musculoskeletal: Negative for back pain. Objective:   /76   Pulse 66   Ht 6' 1\" (1.854 m)   Wt 178 lb (80.7 kg)   BMI 23.48 kg/m²     Physical Exam  Vitals reviewed. Constitutional:       General: He is not in acute distress. Appearance: Normal appearance. He is well-developed. He is not ill-appearing or diaphoretic. HENT:      Head: Normocephalic and atraumatic.       Right Ear: External ear normal.      Left Ear: External ear normal.      Nose: Nose normal.      Mouth/Throat:      Mouth: Mucous membranes are moist.   Eyes:      General: No scleral icterus. Right eye: No discharge. Left eye: No discharge. Neck:      Vascular: No JVD. Trachea: No tracheal deviation. Pulmonary:      Effort: Pulmonary effort is normal. No respiratory distress. Abdominal:      General: There is no distension. Tenderness: There is no abdominal tenderness. There is no right CVA tenderness or left CVA tenderness. Genitourinary:     Comments: Prostate moderately enlarged but smooth without nodule or induration. Musculoskeletal:         General: Normal range of motion. Skin:     General: Skin is warm and dry. Neurological:      Mental Status: He is alert and oriented to person, place, and time. Mental status is at baseline. Psychiatric:         Mood and Affect: Mood normal.         Behavior: Behavior normal.         Thought Content: Thought content normal.         POC  Results for POC orders placed in visit on 04/12/22   POCT Urinalysis No Micro (Auto)   Result Value Ref Range    Glucose, Ur Negative NEGATIVE mg/dl    Bilirubin Urine Negative     Ketones, Urine Negative NEGATIVE    Specific Gravity, Urine 1.020 1.002 - 1.030    Blood, UA POC Negative NEGATIVE    pH, Urine 7.00 5.0 - 9.0    Protein, Urine Negative NEGATIVE mg/dl    Urobilinogen, Urine 1.00 0.0 - 1.0 eu/dl    Nitrite, Urine Negative NEGATIVE    Leukocyte Clumps, Urine Negative NEGATIVE    Color, Urine Yellow [id number]-STRAW    Character, Urine Clear [id number]   poct post void residual   Result Value Ref Range    post void residual 99 ml         Patients recent PSA values are as follows  Lab Results   Component Value Date    PSA 0.45 06/17/2021    PSA 1.95 (H) 04/17/2018        Recent BUN/Creatinine:  Lab Results   Component Value Date    BUN 16 03/09/2022    CREATININE 0.6 03/09/2022       Assessment:   BPH with LUTs    Plan:     Continue hytrin and finasteride.   Refill sent to pharmacy for finasteride. PSA 6/2021 normal.     F/u in 1 year with PVR.

## 2022-04-13 NOTE — PROCEDURES
[address] [place] [address]                            CARDIAC CATHETERIZATION    PATIENT NAME: Uma George                    :        1940  MED REC NO:   [id number]                           ROOM:       0002  ACCOUNT NO:   [de-identified]                           ADMIT DATE: 2022  PROVIDER:     Stephanie Caceres M.D.    Abenarobin Motleyts:  2022    INDICATION FOR PROCEDURE:  This is a patient who is an 79-year-old  patient with history of coronary artery disease. The patient has chest  pain, shortness of breath. He did have a stress Cardiolite test, was  abnormal.  The patient continued to be symptomatic; admitted for a heart  cath, possible intervention. Left heart cath, coronary angiogram.  No ventriculogram was performed. PROCEDURE PERFORMED:  1.  IV conscious sedation:  The patient was given IV conscious sedation  in incremental dosage by the circulating cath lab RN, monitored by the  cath lab monitor tech under my supervision. Procedure was started at  07:56 a.m. and finished at 08:25 a.m. No acute complication. Estimated blood loss about 15 mL. 2.  Left heart cath: The right radial artery was cannulated with a  6-Ind[place]ian sheath. Coronary angiogram showed the RCA is a dominant  artery. RCA was patient. Left main was patent, bifurcates to the LAD and circumflex, after the  origin of the obtuse marginal of the circumflex. The circumflex at the  point of bifurcation thus exhibited about 60% narrowing. Distally, it  was patent. The LAD does have about 70% ostial narrowing. Distally,  the LAD was patent. The left ventricular end-diastolic pressure was 18. There was no gradient across the aortic valve. We tried to pass the  pigtail catheter into the left ventricle. Due to tortuosity of  subclavian artery, this could not be achieved. IMPRESSION:  1. Dominant RCA which was patent.   2.  Patent left main. 3.  About 60% narrowing of the mid circumflex after the origin of the  obtuse marginal.  4.  About 80% narrowing of the ostium of the LAD. 5.  Left ventriculogram was performed due to the difficulty passing the  pigtail catheter through the subclavian artery which is very tortuous. The patient tolerated the procedure well. RECOMMENDATIONS:  At this point, we will continue with the medical  treatment. The patient will be seen in the office. Pending his  clinical course, he could be considered for high risk intervention of  the LAD or revascularization by open heart surgery with LIMA to the LAD. The patient has no acute complication from the procedure. Echo will be  evaluated for further evaluation of his left ventricular function and  further plan of treatment.         Bynum Castleman, M.D.    D: 04/13/2022 10:12:35       T: 04/13/2022 10:57:01     [email or web address]  Job#: [id number]     Doc#: [id number]    CC:

## 2022-04-27 ENCOUNTER — OFFICE VISIT (OUTPATIENT)
Dept: CARDIOLOGY CLINIC | Age: 82
End: 2022-04-27
Payer: MEDICARE

## 2022-04-27 VITALS
WEIGHT: 169 LBS | DIASTOLIC BLOOD PRESSURE: 60 MMHG | SYSTOLIC BLOOD PRESSURE: 122 MMHG | BODY MASS INDEX: 22.3 KG/M2 | HEART RATE: 60 BPM

## 2022-04-27 VITALS
DIASTOLIC BLOOD PRESSURE: 76 MMHG | BODY MASS INDEX: 23.33 KG/M2 | SYSTOLIC BLOOD PRESSURE: 138 MMHG | WEIGHT: 176 LBS | HEIGHT: 73 IN | HEART RATE: 60 BPM

## 2022-04-27 DIAGNOSIS — I35.0 NONRHEUMATIC AORTIC VALVE STENOSIS: ICD-10-CM

## 2022-04-27 DIAGNOSIS — I48.91 ATRIAL FIBRILLATION, UNSPECIFIED TYPE (HCC): Primary | ICD-10-CM

## 2022-04-27 DIAGNOSIS — I25.10 ASHD (ARTERIOSCLEROTIC HEART DISEASE): ICD-10-CM

## 2022-04-27 DIAGNOSIS — I10 ESSENTIAL HYPERTENSION: ICD-10-CM

## 2022-04-27 PROCEDURE — 99213 OFFICE O/P EST LOW 20 MIN: CPT | Performed by: INTERNAL MEDICINE

## 2022-04-27 PROCEDURE — 93000 ELECTROCARDIOGRAM COMPLETE: CPT | Performed by: INTERNAL MEDICINE

## 2022-04-27 RX ORDER — AMLODIPINE BESYLATE 5 MG/1
5 TABLET ORAL DAILY
Qty: 90 TABLET | Refills: 4 | Status: SHIPPED | OUTPATIENT
Start: 2022-04-27

## 2022-04-27 ASSESSMENT — ENCOUNTER SYMPTOMS
TROUBLE SWALLOWING: 0
ABDOMINAL PAIN: 0
CHEST TIGHTNESS: 0
COUGH: 0
VOMITING: 0
BLOOD IN STOOL: 0
COLOR CHANGE: 0
ANAL BLEEDING: 0
STRIDOR: 0
CHOKING: 0
NAUSEA: 0
VOICE CHANGE: 0
ABDOMINAL DISTENTION: 0
WHEEZING: 0
APNEA: 0
SHORTNESS OF BREATH: 0

## 2022-04-27 NOTE — PROGRESS NOTES
[address] [facility],[address]  Dept: [address]  Loc: [phone]     4/27/2022       Nhi Posey is here today for   Chief Complaint   Patient presents with   [address] from [facility] 3/9/22 CATH F/U & ECHO 3/16/22           Referring Physician:  No ref. provider found     Patient Active Problem List   Diagnosis    Atrial fibrillation ([address]    ASHD (arteriosclerotic heart disease)    CHF (congestive heart failure) ([address]    Cholelithiasis    Gluten enteropathy    Pacemaker battery depletion    Complete heart block ([facility]    Benign non-nodular prostatic hyperplasia without lower urinary tract symptoms    Lumbar degenerative disc disease    Kidney stone on right side    Right inguinal hernia    Diverticulosis of large intestine without hemorrhage    Essential hypertension    Personal history of calcium pyrophosphate deposition disease (CPPD)    Localized osteoarthritis of right knee       Review of Systems   Constitutional: Negative for activity change, appetite change, fatigue, fever and unexpected weight change. HENT: Negative for congestion, trouble swallowing and voice change. Eyes: Negative for visual disturbance. Respiratory: Negative for apnea, cough, choking, chest tightness, shortness of breath, wheezing and stridor. Cardiovascular: Negative for chest pain, palpitations and leg swelling. Patient atrial for when he has been having fears of rhythm   Gastrointestinal: Negative for abdominal distention, abdominal pain, anal bleeding, blood in stool, nausea and vomiting. Endocrine: Negative for cold intolerance and heat intolerance. Genitourinary: Negative for hematuria. Musculoskeletal: Negative for arthralgias, gait problem, joint swelling and myalgias. Skin: Negative for color change and rash.    Allergic/Immunologic: Negative for environmental allergies and food allergies. Neurological: Negative for dizziness, tremors, syncope, facial asymmetry, weakness, light-headedness, numbness and headaches. Hematological: Does not bruise/bleed easily. Psychiatric/Behavioral: Negative for agitation, behavioral problems and sleep disturbance. Past Medical History:   Diagnosis Date    Arthritis     Atrial fibrillation ([facility]     Atrial fibrillation ([facility] 07/08/2020    CAD (coronary artery disease)     CHF (congestive heart failure) (HCC)     Dyslipidemia 06/23/2021    Enlarged prostate 06/23/2021    GERD (gastroesophageal reflux disease)     Hyperlipidemia     Hypertension        Allergies   Allergen Reactions    Latex Rash    Gluten Meal     Vasotec [Enalaprilat] Other (See Comments)     cough    Neosporin [Neomycin-Polymyxin-Gramicidin] Rash    Sulfa Antibiotics Rash    Voltaren [Diclofenac Sodium] Rash     gel       Current Outpatient Medications   Medication Sig Dispense Refill    amLODIPine (NORVASC) 5 MG tablet Take 1 tablet by mouth daily 90 tablet 4    finasteride (PROSCAR) 5 MG tablet Take 1 tablet by mouth daily 90 tablet 3    famotidine (PEPCID) 40 MG tablet Take 40 mg by mouth 2 times daily      rosuvastatin (CRESTOR) 5 MG tablet Take 1 tablet by mouth daily 90 tablet 1    terazosin (HYTRIN) 5 MG capsule Take 1 capsule by mouth nightly 90 capsule 1    hydroCHLOROthiazide (HYDRODIURIL) 25 MG tablet Take 1 tablet by mouth daily 90 tablet 1    baclofen (LIORESAL) 10 MG tablet Take 1 tablet by mouth 2 times daily as needed (muscle spasm) 180 tablet 1    ELIQUIS 5 MG TABS tablet 2 times daily       mesalamine (ASACOL HD) 800 MG TBEC TBEC tablet Take 800 mg by mouth daily       captopril (CAPOTEN) 25 MG tablet Take 12.5 mg by mouth 2 times daily       carvedilol (COREG) 25 MG tablet Take 25 mg by mouth 2 times daily (with meals)      digoxin (LANOXIN) 125 MCG tablet Take 1 tablet by mouth daily.  90 tablet 1    gemfibrozil (LOPID) 600 MG tablet Take 1 tablet by mouth 2 times daily (before meals). 180 tablet 1    Coenzyme Q10 (COQ10) 200 MG CAPS Take 200 mg/day by mouth 2 times daily  180 capsule 1    Saw Palmetto, Serenoa repens, 450 MG CAPS Take 425 mg/day by mouth daily. 90 capsule 1     No current facility-administered medications for this visit. Social History     Socioeconomic History    Marital status:      Spouse name: None    Number of children: None    Years of education: None    Highest education level: None   Occupational History    None   Tobacco Use    Smoking status: Former Smoker     Packs/day: 0.50     Years: 15.00     Pack years: 7.50     Quit date: 1965     Years since quittin.3    Smokeless tobacco: Never Used   Substance and Sexual Activity    Alcohol use: No    Drug use: No    Sexual activity: Not Currently   Other Topics Concern    None   Social History Narrative    None     Social Determinants of Health     Financial Resource Strain: Low Risk     Difficulty of Paying Living Expenses: Not hard at all   Food Insecurity: No Food Insecurity    Worried About Running Out of Food in the Last Year: Never true    [place] of Food in the Last Year: Never true   Transportation Needs:     Lack of Transportation (Medical): Not on file    Lack of Transportation (Non-Medical):  Not on file   Physical Activity: Insufficiently Active    Days of Exercise per Week: 3 days    Minutes of Exercise per Session: 20 min   Stress:     Feeling of Stress : Not on file   Social Connections:     Frequency of Communication with Friends and Family: Not on file    Frequency of Social Gatherings with Friends and Family: Not on file    Attends Anabaptist Services: Not on file    Active Member of Clubs or Organizations: Not on file    Attends Club or Organization Meetings: Not on file    Marital Status: Not on file   Intimate Partner Violence:     Fear of Current or Ex-Partner: Not on file   [employer] Abused: Not on file    Physically Abused: Not on file    Sexually Abused: Not on file   Housing Stability:     Unable to Pay for Housing in the Last Year: Not on file    Number of Places Lived in the Last Year: Not on file    Unstable Housing in the Last Year: Not on file       Family History   Problem Relation Age of Onset    Kidney Disease Mother     Cancer Father        Blood pressure 122/60, pulse 60, weight 169 lb (76.7 kg). Physical Exam:    General Appearance: alert and oriented to person, place and time, in no acute distress  Cardiovascular: normal rate, regular rhythm, normal S1 and S2, no murmurs, rubs, clicks, or gallops, distal pulses intact, no carotid bruits, no JVD  Pulmonary/Chest: clear to auscultation bilaterally- no wheezes, rales or rhonchi, normal air movement, no respiratory distress  Abdomen: soft, non-tender, non-distended, normal bowel sounds, no masses   Extremities: no cyanosis, clubbing or edema, pulse   Skin: warm and dry, no rash or erythema  Head: normocephalic and atraumatic  Eyes: pupils equal, round, and reactive to light  Neck: supple and non-tender without mass, no thyromegaly   Musculoskeletal: normal range of motion, no joint swelling, deformity or tenderness  Neurological: alert, oriented, normal speech, no focal findings or movement disorder noted    Lab Data:    Cardiac Enzymes:  No results for input(s): CKTOTAL, CKMB, CKMBINDEX, TROPONINI in the last 72 hours.     CBC:   Lab Results   Component Value Date    WBC 9.6 03/09/2022    RBC 4.56 03/09/2022    HGB 13.8 03/09/2022    HCT 42.1 03/09/2022     03/09/2022       CMP:    Lab Results   Component Value Date     03/09/2022    K 4.5 03/09/2022     03/09/2022    CO2 21 03/09/2022    BUN 16 03/09/2022    CREATININE 0.6 03/09/2022    LABGLOM >90 03/09/2022    GLUCOSE 118 03/09/2022    GLUCOSE 104 05/11/2012    CALCIUM 10.8 03/09/2022       Hepatic Function Panel:    Lab Results   Component Value Date ALKPHOS 62 03/09/2022    ALT 6 03/09/2022    AST 19 03/09/2022    PROT 7.9 03/09/2022    PROT 7.5 09/14/2016    BILITOT 1.2 03/09/2022    BILIDIR <0.2 06/17/2021    LABALBU 4.8 03/09/2022    LABALBU 4.7 05/11/2012       Magnesium:    Lab Results   Component Value Date    MG 2.1 04/17/2018       PT/INR:    Lab Results   Component Value Date    INR 1.16 03/09/2022       HgBA1c:  No results found for: LABA1C    FLP:    Lab Results   Component Value Date    TRIG 65 03/09/2022    HDL 55 03/09/2022    LDLCALC 53 03/09/2022       TSH:  No results found for: TSH     Diagnosis Orders   1. Atrial fibrillation, unspecified type (HCC)  amLODIPine (NORVASC) 5 MG tablet    EKG 12 lead    CBC    Basic Metabolic Panel    Lipid Panel    Hepatic Function Panel   2. ASHD (arteriosclerotic heart disease)  amLODIPine (NORVASC) 5 MG tablet    CBC    Basic Metabolic Panel    Lipid Panel    Hepatic Function Panel   3. Essential hypertension  amLODIPine (NORVASC) 5 MG tablet    CBC    Basic Metabolic Panel    Lipid Panel    Hepatic Function Panel   4. Nonrheumatic aortic valve stenosis          Assessment/Plan    Leana Osorio is an 79years old gentleman who is known to have history of coronary artery disease recently had a heart cath which showed about 80% ostial narrowing of the LAD he also had an echocardiogram showed a preserved systolic follow-up function of the left ventricle with an elevated LVH and moderate aortic stenosis he is here for a follow-up.   He was treated medically and he indicated that he is feeling well but medication he the heart catheterization finding and echo findings were discussed with him different option of treatment were discussed with him and he wants to continue medical treatment he was instructed to seek medical attention if he has any change in clinical condition the patient had history of hypercholesterolemia has been on Crestor and has been under well control history of gastroesophageal reflux and history of atrial fibrillation and pacemaker 96% of the time was in atrial for but he has been on the Eliquis no GI bleed or bleeding disorders the as mentioned the patient wants to continue medical treatment he was instructed to seek medical attention for any change in clinical condition he is to follow-up with family physician was advised about risk factor modification he will be seen periodically. The patient EKG showed a paced rhythm his hemoglobin is 13 hematocrit is 42. And he is satisfied with his lifestyle as mentioned he wants to continue medical treatment thank you    Orders Placed This Encounter   Procedures    CBC     Standing Status:   Future     Standing Expiration Date:   4/27/2023    Basic Metabolic Panel     Standing Status:   Future     Standing Expiration Date:   4/27/2023    Lipid Panel     Standing Status:   Future     Standing Expiration Date:   4/27/2023     Order Specific Question:   Is Patient Fasting?/# of Hours     Answer:   12 hours    Hepatic Function Panel     Standing Status:   Future     Standing Expiration Date:   4/27/2023    EKG 12 lead     Order Specific Question:   Reason for Exam?     Answer:   Coronary artery disease       Return in about 6 months (around 10/27/2022) for cad, as.      Jackie Mtz MD

## 2022-05-06 ENCOUNTER — OFFICE VISIT (OUTPATIENT)
Dept: FAMILY MEDICINE CLINIC | Age: 82
End: 2022-05-06
Payer: MEDICARE

## 2022-05-06 VITALS
RESPIRATION RATE: 14 BRPM | DIASTOLIC BLOOD PRESSURE: 70 MMHG | WEIGHT: 178.6 LBS | SYSTOLIC BLOOD PRESSURE: 130 MMHG | OXYGEN SATURATION: 99 % | TEMPERATURE: 96.6 F | HEART RATE: 61 BPM | HEIGHT: 73 IN | BODY MASS INDEX: 23.67 KG/M2

## 2022-05-06 DIAGNOSIS — M17.11 LOCALIZED OSTEOARTHRITIS OF RIGHT KNEE: Primary | ICD-10-CM

## 2022-05-06 DIAGNOSIS — G89.29 CHRONIC PAIN OF RIGHT KNEE: ICD-10-CM

## 2022-05-06 DIAGNOSIS — M25.561 CHRONIC PAIN OF RIGHT KNEE: ICD-10-CM

## 2022-05-06 PROCEDURE — 99213 OFFICE O/P EST LOW 20 MIN: CPT | Performed by: FAMILY MEDICINE

## 2022-05-06 PROCEDURE — 20610 DRAIN/INJ JOINT/BURSA W/O US: CPT | Performed by: FAMILY MEDICINE

## 2022-05-06 RX ORDER — LIDOCAINE HYDROCHLORIDE 10 MG/ML
4 INJECTION, SOLUTION INFILTRATION; PERINEURAL ONCE
Status: COMPLETED | OUTPATIENT
Start: 2022-05-06 | End: 2022-05-06

## 2022-05-06 RX ORDER — TRIAMCINOLONE ACETONIDE 40 MG/ML
80 INJECTION, SUSPENSION INTRA-ARTICULAR; INTRAMUSCULAR ONCE
Status: COMPLETED | OUTPATIENT
Start: 2022-05-06 | End: 2022-05-06

## 2022-05-06 RX ORDER — BUPIVACAINE HYDROCHLORIDE 5 MG/ML
4 INJECTION, SOLUTION PERINEURAL ONCE
Status: COMPLETED | OUTPATIENT
Start: 2022-05-06 | End: 2022-05-06

## 2022-05-06 RX ADMIN — LIDOCAINE HYDROCHLORIDE 4 ML: 10 INJECTION, SOLUTION INFILTRATION; PERINEURAL at 13:34

## 2022-05-06 RX ADMIN — BUPIVACAINE HYDROCHLORIDE 20 MG: 5 INJECTION, SOLUTION PERINEURAL at 13:34

## 2022-05-06 RX ADMIN — TRIAMCINOLONE ACETONIDE 80 MG: 40 INJECTION, SUSPENSION INTRA-ARTICULAR; INTRAMUSCULAR at 13:35

## 2022-05-06 NOTE — PROGRESS NOTES
[facility]  [facility] MEDICINE  [address] [facility]  Dept: [phone]  Dept Fax: [phone]  Loc: [phone]  PROGRESS NOTE      Visit Date: 5/6/2022    Marybel Durant is a 79 y.o. male who presents today for:  Chief Complaint   Patient presents with    Knee Pain     right knee injection for pain       Subjective:  HPI     Right knee pain. Steroid injection was done in sept and lasted until 1 month ago. Has OA. Uses tylenol. sometimes baclofen helps. Pain is lateral joint line. Worse with prolonged walking. Doing knee HEP. Has tried multiple topical analgesics which cause rashes  Pain swells sometimes. Pain is achy. Pain is 6/10 at its worse. No locking or popping    Review of Systems   Constitutional: Negative for chills and fever. Musculoskeletal: Positive for arthralgias. Negative for joint swelling.      Patient Active Problem List   Diagnosis    Atrial fibrillation ([address]    ASHD (arteriosclerotic heart disease)    CHF (congestive heart failure) ([address]    Cholelithiasis    Gluten enteropathy    Pacemaker battery depletion    Complete heart block ([address]    Benign non-nodular prostatic hyperplasia without lower urinary tract symptoms    Lumbar degenerative disc disease    Kidney stone on right side    Right inguinal hernia    Diverticulosis of large intestine without hemorrhage    Essential hypertension    Personal history of calcium pyrophosphate deposition disease (CPPD)    Localized osteoarthritis of right knee     Past Medical History:   Diagnosis Date    Arthritis     Atrial fibrillation ([address]     Atrial fibrillation ([address] 07/08/2020    CAD (coronary artery disease)     CHF (congestive heart failure) ([facility]     Dyslipidemia 06/23/2021    Enlarged prostate 06/23/2021    GERD (gastroesophageal reflux disease)     Hyperlipidemia     Hypertension       Past Surgical History:   Procedure Laterality Date    BUNIONECTOMY Left 's   [address]      Dr. Morton Number 2015    Dr. Valencia Maki      x2 Dr. Levorn Eisenmenger  2016    Dr. Laila Kraus Right 1960's    inguinal   Ollis Marrow  2008    Dr. Aidee Martinez  2016    Dr. Nasrin Mcneal History   Problem Relation Age of Onset    Kidney Disease Mother     Cancer Father      Social History     Tobacco Use    Smoking status: Former Smoker     Packs/day: 0.50     Years: 15.00     Pack years: 7.50     Quit date: 1965     Years since quittin.3    Smokeless tobacco: Never Used   Substance Use Topics    Alcohol use: No      Current Outpatient Medications   Medication Sig Dispense Refill    amLODIPine (NORVASC) 5 MG tablet Take 1 tablet by mouth daily 90 tablet 4    finasteride (PROSCAR) 5 MG tablet Take 1 tablet by mouth daily 90 tablet 3    famotidine (PEPCID) 40 MG tablet Take 40 mg by mouth 2 times daily      rosuvastatin (CRESTOR) 5 MG tablet Take 1 tablet by mouth daily 90 tablet 1    terazosin (HYTRIN) 5 MG capsule Take 1 capsule by mouth nightly 90 capsule 1    hydroCHLOROthiazide (HYDRODIURIL) 25 MG tablet Take 1 tablet by mouth daily 90 tablet 1    baclofen (LIORESAL) 10 MG tablet Take 1 tablet by mouth 2 times daily as needed (muscle spasm) 180 tablet 1    ELIQUIS 5 MG TABS tablet 2 times daily       mesalamine (ASACOL HD) 800 MG TBEC TBEC tablet Take 800 mg by mouth daily       captopril (CAPOTEN) 25 MG tablet Take 12.5 mg by mouth 2 times daily       carvedilol (COREG) 25 MG tablet Take 25 mg by mouth 2 times daily (with meals)      digoxin (LANOXIN) 125 MCG tablet Take 1 tablet by mouth daily. 90 tablet 1    gemfibrozil (LOPID) 600 MG tablet Take 1 tablet by mouth 2 times daily (before meals).  180 tablet 1    Coenzyme Q10 (COQ10) 200 MG CAPS Take 200 mg/day by mouth 2 times daily  180 capsule 1    Saw Palmetto, Serenoa repens, 450 MG CAPS Take 425 mg/day by mouth daily. 90 capsule 1     No current facility-administered medications for this visit. Allergies   Allergen Reactions    Latex Rash    Gluten Meal     Vasotec [Enalaprilat] Other (See Comments)     cough    Neosporin [Neomycin-Polymyxin-Gramicidin] Rash    Sulfa Antibiotics Rash    Voltaren [Diclofenac Sodium] Rash     gel     Health Maintenance   Topic Date Due    Shingles vaccine (1 of 2) Never done    Depression Screen  02/21/2023    Annual Wellness Visit (AWV)  02/22/2023    Lipids  03/09/2023    Potassium  03/09/2023    Creatinine  03/09/2023    DTaP/Tdap/Td vaccine (2 - Td or Tdap) 06/09/2027    Flu vaccine  Completed    Pneumococcal 65+ years Vaccine  Completed    COVID-19 Vaccine  Completed    Hepatitis A vaccine  Aged Out    Hepatitis B vaccine  Aged Out    Hib vaccine  Aged Out    Meningococcal (ACWY) vaccine  Aged Out       Objective:  /70 (Site: Right Upper Arm, Position: Sitting)   Pulse 61   Temp 96.6 °F (35.9 °C)   Resp 14   Ht 6' 1\" (1.854 m)   Wt 178 lb 9.6 oz (81 kg)   SpO2 99%   BMI 23.56 kg/m²   Physical Exam  Vitals reviewed. Constitutional:       General: He is not in acute distress. Appearance: He is not ill-appearing. Neurological:      Mental Status: He is alert. Psychiatric:         Mood and Affect: Mood normal.       Right knee: no effusion. Tenderness of the lateral joint line. Range of motion is from 0 to 120 degrees. Negative valgus and varus. 5 -/5 strength for flexion extension    Right hip: Lacks any internal rotation and may be -10 IR. Procedure Note right Knee Injection    Discussed risks and benefits of steroid injection, including but not limited to infection, skin discoloration, pain, and bleeding.     With the patient's verbal informed consent, his right knee was prepped  in standard sterile fashion with Betadine and Alcohol. Ethyl chloride was used to anesthetize the skin. A mixture of 4 cc of 0.5% Bupivacaine, 4 cc of 1% Lidocaine,  and 2 cc of Kenalog 40 mg was injected into the right anterior lateral joint space. The patient tolerated this well without difficulty. A band-aid was applied and the patient was advised to ice the knee for 15-20 minutes to relieve any injection site related pain. Impression/Plan:  1. Localized osteoarthritis of right knee  Right knee pain secondary to osteoarthritis. Continue Tylenol. Home exercise program was provided. Ice. Steroid injection was performed as described above  - MA ARTHROCENTESIS ASPIR&/INJ MAJOR JT/BURSA W/O US  - lidocaine 1 % injection 4 mL  - bupivacaine (MARCAINE) 0.5 % injection 20 mg  - triamcinolone acetonide (KENALOG-40) injection 80 mg    2. Chronic pain of right knee  - MA ARTHROCENTESIS ASPIR&/INJ MAJOR JT/BURSA W/O US  - lidocaine 1 % injection 4 mL  - bupivacaine (MARCAINE) 0.5 % injection 20 mg  - triamcinolone acetonide (KENALOG-40) injection 80 mg      They voiced understanding. All questions answered. They agreed with treatment plan. See patient instructions for any educational materials that may have been given. Discussed use, benefit, and side effects of prescribed medications. Reviewed health maintenance. (Please note that portions of this note may have been completed with a voice recognition program.  Efforts were made to edit the dictation but occasionally words are mis-transcribed.)    Return if symptoms worsen or fail to improve.       Electronically signed by Elizabeth Draper MD on 5/6/2022 at 11:55 AM

## 2022-05-06 NOTE — PATIENT INSTRUCTIONS
Patient Education        Knee Arthritis: Exercises  Introduction  Here are some examples of exercises for you to try. The exercises may be suggested for a condition or for rehabilitation. Start each exercise slowly. Ease off the exercises if you start to have pain. You will be told when to start these exercises and which ones will work bestfor you. How to do the exercises  Knee flexion with heel slide    1. Lie on your back with your knees bent. 2. Slide your heel back by bending your affected knee as far as you can. Then hook your other foot around your ankle to help pull your heel even farther back. 3. Hold for about 6 seconds, then rest for up to 10 seconds. 4. Repeat 8 to 12 times. 5. Switch legs and repeat steps 1 through 4, even if only one knee is sore. Quad sets    1. Sit with your affected leg straight and supported on the floor or a firm bed. Place a small, rolled-up towel under your knee. Your other leg should be bent, with that foot flat on the floor. 2. Tighten the thigh muscles of your affected leg by pressing the back of your knee down into the towel. 3. Hold for about 6 seconds, then rest for up to 10 seconds. 4. Repeat 8 to 12 times. 5. Switch legs and repeat steps 1 through 4, even if only one knee is sore. Straight-leg raises to the front    1. Lie on your back with your good knee bent so that your foot rests flat on the floor. Your affected leg should be straight. Make sure that your low back has a normal curve. You should be able to slip your hand in between the floor and the small of your back, with your palm touching the floor and your back touching the back of your hand. 2. Tighten the thigh muscles in your affected leg by pressing the back of your knee flat down to the floor. Hold your knee straight. 3. Keeping the thigh muscles tight and your leg straight, lift your affected leg up so that your heel is about 12 inches off the floor.  Hold for about 6 seconds, then lower slowly. 4. Relax for up to 10 seconds between repetitions. 5. Repeat 8 to 12 times. 6. Switch legs and repeat steps 1 through 5, even if only one knee is sore. Active knee flexion    1. Lie on your stomach with your knees straight. If your kneecap is uncomfortable, roll up a washcloth and put it under your leg just above your kneecap. 2. Lift the foot of your affected leg by bending the knee so that you bring the foot up toward your buttock. If this motion hurts, try it without bending your knee quite as far. This may help you avoid any painful motion. 3. Slowly move your leg up and down. 4. Repeat 8 to 12 times. 5. Switch legs and repeat steps 1 through 4, even if only one knee is sore. Quadriceps stretch (facedown)    1. Lie flat on your stomach, and rest your face on the floor. 2. Wrap a towel or belt strap around the lower part of your affected leg. Then use the towel or belt strap to slowly pull your heel toward your buttock until you feel a stretch. 3. Hold for about 15 to 30 seconds, then relax your leg against the towel or belt strap. 4. Repeat 2 to 4 times. 5. Switch legs and repeat steps 1 through 4, even if only one knee is sore. Stationary exercise bike    1. If you do not have a stationary exercise bike at home, you can find one to ride at your local health club or community center. 2. Adjust the height of the bike seat so that your knee is slightly bent when your leg is extended downward. If your knee hurts when the pedal reaches the top, you can raise the seat so that your knee does not bend as much. 3. Start slowly. At first, try to do 5 to 10 minutes of cycling with little to no resistance. Then increase your time and the resistance bit by bit until you can do 20 to 30 minutes without pain. 4. If you start to have pain, rest your knee until your pain gets back to the level that is normal for you. Or cycle for less time or with less effort.   Follow-up care is a key part of your treatment and safety. Be sure to make and go to all appointments, and call your doctor if you are having problems. It's also a good idea to know your test results and keep alist of the medicines you take. Where can you learn more? Go to [email or web address] org and sign in to your [employer] account. Enter C159 in the [employer] box to learn more about \"Knee Arthritis: Exercises. \"     If you do not have an account, please click on the \"Sign Up Now\" link. Current as of: July 1, 2021               Content Version: 13.2  © 2006-2022 [employer]. Care instructions adapted under license by [facility] ([facility]). If you have questions about a medical condition or this instruction, always ask your healthcare professional. [address] any warranty or liability for your use of this information.

## 2022-05-06 NOTE — PROGRESS NOTES
Medication(s) given during visit:    Administrations This Visit     bupivacaine (MARCAINE) 0.5 % injection 20 mg     Admin Date  05/06/2022  13:34 Action  Given Dose  20 mg Route  Intra-artICUlar Site  Knee Right Administered By  Isaac Jon LPN    Ordering Provider: Alice Rodriguez MD    NDC: 4153-3711-17    Lot#: [id number]    : Sylvester Spence    Patient Supplied?: No          lidocaine 1 % injection 4 mL     Admin Date  05/06/2022  13:34 Action  Given Dose  4 mL Route  Other Site  Knee Right Administered By  Isaac Jon LPN    Ordering Provider: Alice Rodriguez MD    NDC: 8831-9405-91    Lot#: [id number]    : PLXBQ    Patient Supplied?: No          triamcinolone acetonide (KENALOG-40) injection 80 mg     Admin Date  05/06/2022  13:35 Action  Given Dose  80 mg Route  Intra-artICUlar Site  Knee Right Administered By  Isaac Jon LPN    Ordering Provider: Alice Rodriguez MD    NDC: 9211-1135-14    Lot#: [id number]    : [facility] [place] (PRIMARY [facility])    Patient Supplied?: No                Patient instructed to remain in clinic for 20 minutes after injection and was advised to report any adverse reaction to me immediately.

## 2022-06-16 PROCEDURE — 93280 PM DEVICE PROGR EVAL DUAL: CPT | Performed by: INTERNAL MEDICINE

## 2022-07-02 ENCOUNTER — PROCEDURE VISIT (OUTPATIENT)
Dept: CARDIOLOGY CLINIC | Age: 82
End: 2022-07-02
Payer: MEDICARE

## 2022-07-02 DIAGNOSIS — Z95.0 ARTIFICIAL PACEMAKER: Primary | ICD-10-CM

## 2022-08-22 ENCOUNTER — OFFICE VISIT (OUTPATIENT)
Dept: FAMILY MEDICINE CLINIC | Age: 82
End: 2022-08-22
Payer: MEDICARE

## 2022-08-22 VITALS
BODY MASS INDEX: 23.62 KG/M2 | DIASTOLIC BLOOD PRESSURE: 70 MMHG | HEART RATE: 69 BPM | WEIGHT: 178.2 LBS | OXYGEN SATURATION: 99 % | HEIGHT: 73 IN | TEMPERATURE: 97.8 F | RESPIRATION RATE: 16 BRPM | SYSTOLIC BLOOD PRESSURE: 134 MMHG

## 2022-08-22 DIAGNOSIS — I10 ESSENTIAL HYPERTENSION: Primary | ICD-10-CM

## 2022-08-22 DIAGNOSIS — I25.10 ASHD (ARTERIOSCLEROTIC HEART DISEASE): ICD-10-CM

## 2022-08-22 DIAGNOSIS — M62.838 MUSCLE SPASM: ICD-10-CM

## 2022-08-22 DIAGNOSIS — N40.0 BENIGN NON-NODULAR PROSTATIC HYPERPLASIA WITHOUT LOWER URINARY TRACT SYMPTOMS: ICD-10-CM

## 2022-08-22 DIAGNOSIS — R73.09 ELEVATED GLUCOSE: ICD-10-CM

## 2022-08-22 PROCEDURE — 1123F ACP DISCUSS/DSCN MKR DOCD: CPT | Performed by: FAMILY MEDICINE

## 2022-08-22 PROCEDURE — 99214 OFFICE O/P EST MOD 30 MIN: CPT | Performed by: FAMILY MEDICINE

## 2022-08-22 RX ORDER — TERAZOSIN 5 MG/1
5 CAPSULE ORAL NIGHTLY
Qty: 90 CAPSULE | Refills: 1 | Status: SHIPPED | OUTPATIENT
Start: 2022-08-22 | End: 2022-09-19

## 2022-08-22 RX ORDER — HYDROCHLOROTHIAZIDE 25 MG/1
25 TABLET ORAL DAILY
Qty: 90 TABLET | Refills: 1 | Status: SHIPPED | OUTPATIENT
Start: 2022-08-22

## 2022-08-22 RX ORDER — BACLOFEN 10 MG/1
10 TABLET ORAL 3 TIMES DAILY PRN
Qty: 270 TABLET | Refills: 1 | Status: SHIPPED | OUTPATIENT
Start: 2022-08-22

## 2022-08-22 RX ORDER — ROSUVASTATIN CALCIUM 5 MG/1
5 TABLET, COATED ORAL DAILY
Qty: 90 TABLET | Refills: 1 | Status: SHIPPED | OUTPATIENT
Start: 2022-08-22

## 2022-08-22 SDOH — ECONOMIC STABILITY: FOOD INSECURITY: WITHIN THE PAST 12 MONTHS, YOU WORRIED THAT YOUR FOOD WOULD RUN OUT BEFORE YOU GOT MONEY TO BUY MORE.: NEVER TRUE

## 2022-08-22 SDOH — ECONOMIC STABILITY: FOOD INSECURITY: WITHIN THE PAST 12 MONTHS, THE FOOD YOU BOUGHT JUST DIDN'T LAST AND YOU DIDN'T HAVE MONEY TO GET MORE.: NEVER TRUE

## 2022-08-22 ASSESSMENT — ENCOUNTER SYMPTOMS
ABDOMINAL PAIN: 0
BLOOD IN STOOL: 0
SHORTNESS OF BREATH: 0
WHEEZING: 0

## 2022-08-22 ASSESSMENT — SOCIAL DETERMINANTS OF HEALTH (SDOH): HOW HARD IS IT FOR YOU TO PAY FOR THE VERY BASICS LIKE FOOD, HOUSING, MEDICAL CARE, AND HEATING?: NOT HARD AT ALL

## 2022-08-22 NOTE — PROGRESS NOTES
[facility]  [address] [facility]  Dept: [phone]  Dept Fax: [phone]  Loc: [phone]  PROGRESS NOTE      Visit Date: 8/22/2022    Yojana Camacho is a 79 y.o. male who presents today for:  Chief Complaint   Patient presents with    Hypertension       Subjective:  Hypertension  Pertinent negatives include no chest pain or shortness of breath. 6 month f/u     HTN:  On hctz, hytrin, coreg, and captopril. Norvasc was added in April. Has A. Fib, CAD, and chronic systolic CHF. Has pacemaker. On eliquis. Had heart cath in march. Gastritis. On pepcid. No abd pain or nausea. Hypercholesterolemia:  On crestor and lopid. No myalgias. He is on co Q 10. BPH:  On hytrin and proscar. Sees urology. muscle spasms in back: takes baclofen prn    Right knee pain. Has OA. Last steroid injection was in may 2022. Intermittent stiffness. Uses tylenol. No concerns    Review of Systems   Respiratory:  Negative for shortness of breath and wheezing. Cardiovascular:  Negative for chest pain and leg swelling. Gastrointestinal:  Negative for abdominal pain and blood in stool. Musculoskeletal:  Positive for arthralgias. Neurological:  Negative for dizziness and syncope.    Patient Active Problem List   Diagnosis    Atrial fibrillation (HCC)    ASHD (arteriosclerotic heart disease)    CHF (congestive heart failure) (HCC)    Cholelithiasis    Gluten enteropathy    Pacemaker battery depletion    Complete heart block ([address]    Benign non-nodular prostatic hyperplasia without lower urinary tract symptoms    Lumbar degenerative disc disease    Kidney stone on right side    Right inguinal hernia    Diverticulosis of large intestine without hemorrhage    Essential hypertension    Personal history of calcium pyrophosphate deposition disease (CPPD)    Localized osteoarthritis of right knee     Past Medical History:   Diagnosis Date    Arthritis     Atrial fibrillation (HCC)     Atrial fibrillation ([facility] [address] 2020    CAD (coronary artery disease)     CHF (congestive heart failure) (HCC)     Dyslipidemia 2021    Enlarged prostate 2021    GERD (gastroesophageal reflux disease)     Hyperlipidemia     Hypertension       Past Surgical History:   Procedure Laterality Date    BUNIONECTOMY Left 's    CARDIAC CATHETERIZATION      Dr. Taz Hall Left     Dr. Anjali Posey    COLONOSCOPY      x2 Dr. Mirna Lugo      Dr. Ginger Abraham Right     inguinal    Junito [place]      Dr. Roman Cintron      Dr. Michaels Marking History   Problem Relation Age of Onset    Kidney Disease Mother     Cancer Father      Social History     Tobacco Use    Smoking status: Former     Packs/day: 0.50     Years: 15.00     Pack years: 7.50     Types: Cigarettes     Quit date: 1965     Years since quittin.6    Smokeless tobacco: Never   Substance Use Topics    Alcohol use: No      Current Outpatient Medications   Medication Sig Dispense Refill    amLODIPine (NORVASC) 5 MG tablet Take 1 tablet by mouth daily 90 tablet 4    finasteride (PROSCAR) 5 MG tablet Take 1 tablet by mouth daily 90 tablet 3    famotidine (PEPCID) 40 MG tablet Take 40 mg by mouth 2 times daily      rosuvastatin (CRESTOR) 5 MG tablet Take 1 tablet by mouth daily 90 tablet 1    terazosin (HYTRIN) 5 MG capsule Take 1 capsule by mouth nightly 90 capsule 1    hydroCHLOROthiazide (HYDRODIURIL) 25 MG tablet Take 1 tablet by mouth daily 90 tablet 1    baclofen (LIORESAL) 10 MG tablet Take 1 tablet by mouth 2 times daily as needed (muscle spasm) 180 tablet 1    ELIQUIS 5 MG TABS tablet 2 times daily       mesalamine (DELZICOL) 800 MG TBEC TBEC tablet Take 800 mg by mouth daily       captopril (CAPOTEN) 25 MG tablet Take 12.5 mg by mouth 2 times daily       carvedilol (COREG) 25 MG tablet Take 25 mg by mouth 2 times daily (with meals)      digoxin (LANOXIN) 125 MCG tablet Take 1 tablet by mouth daily. 90 tablet 1    gemfibrozil (LOPID) 600 MG tablet Take 1 tablet by mouth 2 times daily (before meals). 180 tablet 1    Coenzyme Q10 (COQ10) 200 MG CAPS Take 200 mg/day by mouth 2 times daily  180 capsule 1    Saw Palmetto, Serenoa repens, 450 MG CAPS Take 425 mg/day by mouth daily. 90 capsule 1     No current facility-administered medications for this visit. Allergies   Allergen Reactions    Latex Rash    Gluten Meal     Vasotec [Enalaprilat] Other (See Comments)     cough    Neosporin [Neomycin-Polymyxin-Gramicidin] Rash    Sulfa Antibiotics Rash    Voltaren [Diclofenac Sodium] Rash     gel     Health Maintenance   Topic Date Due    Shingles vaccine (1 of 2) Never done    Flu vaccine (1) 09/01/2022    Depression Screen  02/21/2023    Annual Wellness Visit (AWV)  02/22/2023    Lipids  03/09/2023    DTaP/Tdap/Td vaccine (2 - Td or Tdap) 06/09/2027    Pneumococcal 65+ years Vaccine  Completed    COVID-19 Vaccine  Completed    Hepatitis A vaccine  Aged Out    Hepatitis B vaccine  Aged Out    Hib vaccine  Aged Out    Meningococcal (ACWY) vaccine  Aged Out       Objective:  /70 (Site: Left Upper Arm, Position: Sitting)   Pulse 69   Temp 97.8 °F (36.6 °C)   Resp 16   Ht 6' 1\" (1.854 m)   Wt 178 lb 3.2 oz (80.8 kg)   SpO2 99%   BMI 23.51 kg/m²   Physical Exam  Vitals reviewed. Constitutional:       General: He is not in acute distress. Appearance: He is well-developed. Cardiovascular:      Rate and Rhythm: Normal rate and regular rhythm. Heart sounds: No murmur heard. Pulmonary:      Effort: Pulmonary effort is normal. No respiratory distress. Breath sounds: Normal breath sounds. No wheezing. Musculoskeletal:      Right lower leg: No edema. Left lower leg: No edema.    Neurological: Mental Status: He is alert. Mental status is at baseline. Psychiatric:         Mood and Affect: Mood normal.         Behavior: Behavior normal.       Lab Results   Component Value Date    WBC 9.6 03/09/2022    HGB 13.8 (L) 03/09/2022    HCT 42.1 03/09/2022     03/09/2022    CHOL 121 03/09/2022    TRIG 65 03/09/2022    HDL 55 03/09/2022    ALT 6 (L) 03/09/2022    AST 19 03/09/2022     03/09/2022    K 4.5 03/09/2022     03/09/2022    CREATININE 0.6 03/09/2022    BUN 16 03/09/2022    CO2 21 (L) 03/09/2022    PSA 0.45 06/17/2021    INR 1.16 (H) 03/09/2022         Impression/Plan:  1. Essential hypertension  Chronic. Well-controlled. Continue Coreg, captopril, amlodipine, hydrochlorothiazide, and Hytrin. - terazosin (HYTRIN) 5 MG capsule; Take 1 capsule by mouth nightly  Dispense: 90 capsule; Refill: 1    2. ASHD (arteriosclerotic heart disease)  Chronic. Medical management. Continue Crestor.  - hydroCHLOROthiazide (HYDRODIURIL) 25 MG tablet; Take 1 tablet by mouth daily  Dispense: 90 tablet; Refill: 1  - terazosin (HYTRIN) 5 MG capsule; Take 1 capsule by mouth nightly  Dispense: 90 capsule; Refill: 1  - rosuvastatin (CRESTOR) 5 MG tablet; Take 1 tablet by mouth daily  Dispense: 90 tablet; Refill: 1    3. Muscle spasm  Chronic. In the low back. Has been worse. Increase baclofen to 3 times per day  - baclofen (LIORESAL) 10 MG tablet; Take 1 tablet by mouth 3 times daily as needed (muscle spasm)  Dispense: 270 tablet; Refill: 1    4. Benign non-nodular prostatic hyperplasia without lower urinary tract symptoms  Chronic. Stable. Continue Hytrin and Proscar  - terazosin (HYTRIN) 5 MG capsule; Take 1 capsule by mouth nightly  Dispense: 90 capsule; Refill: 1    5. Elevated glucose  Mildly elevated glucose chronically. Check A1c to rule out diabetes  - Hemoglobin A1C; Future      They voiced understanding. All questions answered. They agreed with treatment plan.    See patient instructions for any educational materials that may have been given. Discussed use, benefit, and side effects of prescribed medications. Reviewed health maintenance. (Please note that portions of this note may have been completed with a voice recognition program.  Efforts were made to edit the dictation but occasionally words are mis-transcribed.)    Return in about 6 months (around 2/22/2023) for medicare wellness.    Return sooner when he is ready for right knee re-eval and possible injection      Electronically signed by Adriane Parham MD on 8/22/2022 at 11:07 AM

## 2022-09-15 ENCOUNTER — HOSPITAL ENCOUNTER (OUTPATIENT)
Age: 82
Discharge: HOME | End: 2022-09-15
Payer: MEDICARE

## 2022-09-15 DIAGNOSIS — I25.10 ASHD (ARTERIOSCLEROTIC HEART DISEASE): ICD-10-CM

## 2022-09-15 DIAGNOSIS — R73.09 ELEVATED GLUCOSE: ICD-10-CM

## 2022-09-15 DIAGNOSIS — I48.91 ATRIAL FIBRILLATION, UNSPECIFIED TYPE (HCC): ICD-10-CM

## 2022-09-15 DIAGNOSIS — I10 ESSENTIAL HYPERTENSION: ICD-10-CM

## 2022-09-15 LAB
ALBUMIN SERPL-MCNC: 4.7 G/DL (ref 3.5–5.1)
ALP BLD-CCNC: 93 U/L (ref 38–126)
ALT SERPL-CCNC: < 5 U/L (ref 11–66)
ANION GAP SERPL CALCULATED.3IONS-SCNC: 11 MEQ/L (ref 8–16)
AST SERPL-CCNC: 23 U/L (ref 5–40)
BILIRUB SERPL-MCNC: 0.8 MG/DL (ref 0.3–1.2)
BILIRUBIN DIRECT: < 0.2 MG/DL (ref 0–0.3)
BUN BLDV-MCNC: 14 MG/DL (ref 7–22)
CALCIUM SERPL-MCNC: 11.2 MG/DL (ref 8.5–10.5)
CHLORIDE BLD-SCNC: 99 MEQ/L (ref 98–111)
CHOLESTEROL, TOTAL: 124 MG/DL (ref 100–199)
CO2: 27 MEQ/L (ref 23–33)
CREAT SERPL-MCNC: 0.5 MG/DL (ref 0.4–1.2)
ERYTHROCYTE [DISTWIDTH] IN BLOOD BY AUTOMATED COUNT: 14.3 % (ref 11.5–14.5)
ERYTHROCYTE [DISTWIDTH] IN BLOOD BY AUTOMATED COUNT: 50 FL (ref 35–45)
GFR SERPL CREATININE-BSD FRML MDRD: > 90 ML/MIN/1.73M2
GLUCOSE BLD-MCNC: 107 MG/DL (ref 70–108)
HCT VFR BLD CALC: 40 % (ref 42–52)
HDLC SERPL-MCNC: 49 MG/DL
HEMOGLOBIN: 13 GM/DL (ref 14–18)
LDL CHOLESTEROL CALCULATED: 58 MG/DL
MCH RBC QN AUTO: 30.8 PG (ref 26–33)
MCHC RBC AUTO-ENTMCNC: 32.5 GM/DL (ref 32.2–35.5)
MCV RBC AUTO: 94.8 FL (ref 80–94)
PLATELET # BLD: 200 THOU/MM3 (ref 130–400)
PMV BLD AUTO: 10.2 FL (ref 9.4–12.4)
POTASSIUM SERPL-SCNC: 4 MEQ/L (ref 3.5–5.2)
RBC # BLD: 4.22 MILL/MM3 (ref 4.7–6.1)
SODIUM BLD-SCNC: 137 MEQ/L (ref 135–145)
TOTAL PROTEIN: 7.8 G/DL (ref 6.1–8)
TRIGL SERPL-MCNC: 86 MG/DL (ref 0–199)
WBC # BLD: 9.5 THOU/MM3 (ref 4.8–10.8)

## 2022-09-15 PROCEDURE — 80053 COMPREHEN METABOLIC PANEL: CPT

## 2022-09-15 PROCEDURE — 80061 LIPID PANEL: CPT

## 2022-09-15 PROCEDURE — 36415 COLL VENOUS BLD VENIPUNCTURE: CPT

## 2022-09-15 PROCEDURE — 83036 HEMOGLOBIN GLYCOSYLATED A1C: CPT

## 2022-09-15 PROCEDURE — 82248 BILIRUBIN DIRECT: CPT

## 2022-09-15 PROCEDURE — 85027 COMPLETE CBC AUTOMATED: CPT

## 2022-09-16 LAB
AVERAGE GLUCOSE: 111 MG/DL (ref 70–126)
HBA1C MFR BLD: 5.7 % (ref 4.4–6.4)

## 2022-09-19 ENCOUNTER — OFFICE VISIT (OUTPATIENT)
Dept: CARDIOLOGY CLINIC | Age: 82
End: 2022-09-19
Payer: MEDICARE

## 2022-09-19 ENCOUNTER — TELEPHONE (OUTPATIENT)
Dept: FAMILY MEDICINE CLINIC | Age: 82
End: 2022-09-19

## 2022-09-19 VITALS
RESPIRATION RATE: 20 BRPM | HEIGHT: 73 IN | DIASTOLIC BLOOD PRESSURE: 76 MMHG | SYSTOLIC BLOOD PRESSURE: 130 MMHG | BODY MASS INDEX: 22.77 KG/M2 | WEIGHT: 171.8 LBS | HEART RATE: 67 BPM

## 2022-09-19 DIAGNOSIS — E78.5 DYSLIPIDEMIA (HIGH LDL; LOW HDL): ICD-10-CM

## 2022-09-19 DIAGNOSIS — I44.2 COMPLETE HEART BLOCK (HCC): Primary | ICD-10-CM

## 2022-09-19 DIAGNOSIS — I10 ESSENTIAL HYPERTENSION: ICD-10-CM

## 2022-09-19 DIAGNOSIS — I48.21 PERMANENT ATRIAL FIBRILLATION (HCC): ICD-10-CM

## 2022-09-19 DIAGNOSIS — I25.10 ASHD (ARTERIOSCLEROTIC HEART DISEASE): ICD-10-CM

## 2022-09-19 DIAGNOSIS — N40.0 BENIGN NON-NODULAR PROSTATIC HYPERPLASIA WITHOUT LOWER URINARY TRACT SYMPTOMS: ICD-10-CM

## 2022-09-19 PROCEDURE — 93000 ELECTROCARDIOGRAM COMPLETE: CPT | Performed by: INTERNAL MEDICINE

## 2022-09-19 PROCEDURE — 99214 OFFICE O/P EST MOD 30 MIN: CPT | Performed by: INTERNAL MEDICINE

## 2022-09-19 PROCEDURE — 1123F ACP DISCUSS/DSCN MKR DOCD: CPT | Performed by: INTERNAL MEDICINE

## 2022-09-19 RX ORDER — CAPTOPRIL 25 MG/1
12.5 TABLET ORAL 2 TIMES DAILY
Qty: 90 TABLET | Refills: 3 | Status: SHIPPED | OUTPATIENT
Start: 2022-09-19

## 2022-09-19 RX ORDER — TERAZOSIN 5 MG/1
5 CAPSULE ORAL NIGHTLY
COMMUNITY

## 2022-09-19 RX ORDER — APIXABAN 5 MG/1
5 TABLET, FILM COATED ORAL 2 TIMES DAILY
Qty: 180 TABLET | Refills: 3 | Status: SHIPPED | OUTPATIENT
Start: 2022-09-19

## 2022-09-19 RX ORDER — CARVEDILOL 25 MG/1
25 TABLET ORAL 2 TIMES DAILY WITH MEALS
Qty: 180 TABLET | Refills: 3 | Status: SHIPPED | OUTPATIENT
Start: 2022-09-19

## 2022-09-19 ASSESSMENT — ENCOUNTER SYMPTOMS
ANAL BLEEDING: 0
ABDOMINAL DISTENTION: 0
CHEST TIGHTNESS: 0
APNEA: 0
COLOR CHANGE: 0
SHORTNESS OF BREATH: 0
BLOOD IN STOOL: 0
ABDOMINAL PAIN: 0
COUGH: 0
TROUBLE SWALLOWING: 0
VOICE CHANGE: 0
WHEEZING: 0
VOMITING: 0
STRIDOR: 0
CHOKING: 0
NAUSEA: 0

## 2022-09-19 NOTE — PROGRESS NOTES
[address]  Dept: [address]  Loc: [phone]     9/19/2022       Loli Alvarenga is here today for   Chief Complaint   Patient presents with    Follow-up           Referring Physician:  No ref. provider found     Patient Active Problem List   Diagnosis    Atrial fibrillation ([facility]    ASHD (arteriosclerotic heart disease)    CHF (congestive heart failure) (HCC)    Cholelithiasis    Gluten enteropathy    Pacemaker battery depletion    Complete heart block ([facility]    Benign non-nodular prostatic hyperplasia without lower urinary tract symptoms    Lumbar degenerative disc disease    Kidney stone on right side    Right inguinal hernia    Diverticulosis of large intestine without hemorrhage    Essential hypertension    Personal history of calcium pyrophosphate deposition disease (CPPD)    Localized osteoarthritis of right knee       Review of Systems   Constitutional:  Negative for activity change, appetite change, fatigue, fever and unexpected weight change. HENT:  Negative for congestion, trouble swallowing and voice change. Eyes:  Negative for visual disturbance. Respiratory:  Negative for apnea, cough, choking, chest tightness, shortness of breath, wheezing and stridor. Cardiovascular:  Negative for chest pain, palpitations and leg swelling. Gastrointestinal:  Negative for abdominal distention, abdominal pain, anal bleeding, blood in stool, nausea and vomiting. Endocrine: Negative for cold intolerance and heat intolerance. Genitourinary:  Negative for hematuria. Musculoskeletal:  Negative for arthralgias, gait problem, joint swelling and myalgias. Skin:  Negative for color change and rash. Allergic/Immunologic: Negative for environmental allergies and food allergies.    Neurological:  Negative for dizziness, tremors, syncope, facial asymmetry, weakness, light-headedness, numbness and headaches. Hematological:  Does not bruise/bleed easily. Psychiatric/Behavioral:  Negative for agitation, behavioral problems and sleep disturbance. Past Medical History:   Diagnosis Date    Arthritis     Atrial fibrillation [facility])     Atrial fibrillation ([facility] 07/08/2020    CAD (coronary artery disease)     CHF (congestive heart failure) ([facility])     Dyslipidemia 06/23/2021    Enlarged prostate 06/23/2021    GERD (gastroesophageal reflux disease)     Hyperlipidemia     Hypertension        Allergies   Allergen Reactions    Latex Rash    Gluten Meal     Vasotec [Enalaprilat] Other (See Comments)     cough    Neosporin [Neomycin-Polymyxin-Gramicidin] Rash    Sulfa Antibiotics Rash    Voltaren [Diclofenac Sodium] Rash     gel       Current Outpatient Medications   Medication Sig Dispense Refill    terazosin (HYTRIN) 5 MG capsule Take 5 mg by mouth nightly      carvedilol (COREG) 25 MG tablet Take 1 tablet by mouth 2 times daily (with meals) 180 tablet 3    captopril (CAPOTEN) 25 MG tablet Take 0.5 tablets by mouth 2 times daily 90 tablet 3    ELIQUIS 5 MG TABS tablet Take 1 tablet by mouth 2 times daily 180 tablet 3    hydroCHLOROthiazide (HYDRODIURIL) 25 MG tablet Take 1 tablet by mouth daily 90 tablet 1    baclofen (LIORESAL) 10 MG tablet Take 1 tablet by mouth 3 times daily as needed (muscle spasm) 270 tablet 1    rosuvastatin (CRESTOR) 5 MG tablet Take 1 tablet by mouth daily 90 tablet 1    amLODIPine (NORVASC) 5 MG tablet Take 1 tablet by mouth daily 90 tablet 4    finasteride (PROSCAR) 5 MG tablet Take 1 tablet by mouth daily 90 tablet 3    famotidine (PEPCID) 40 MG tablet Take 40 mg by mouth 2 times daily      mesalamine (DELZICOL) 800 MG TBEC TBEC tablet Take 800 mg by mouth daily       digoxin (LANOXIN) 125 MCG tablet Take 1 tablet by mouth daily. 90 tablet 1    gemfibrozil (LOPID) 600 MG tablet Take 1 tablet by mouth 2 times daily (before meals).  180 tablet 1    Coenzyme Q10 (COQ10) 200 MG CAPS Take 200 mg/day by mouth 2 times daily  180 capsule 1     No current facility-administered medications for this visit. Social History     Socioeconomic History    Marital status:      Spouse name: None    Number of children: None    Years of education: None    Highest education level: None   Tobacco Use    Smoking status: Former     Packs/day: 0.50     Years: 15.00     Pack years: 7.50     Types: Cigarettes     Quit date: 1965     Years since quittin.7    Smokeless tobacco: Never   Substance and Sexual Activity    Alcohol use: No    Drug use: No    Sexual activity: Not Currently     Social Determinants of Health     Financial Resource Strain: Low Risk     Difficulty of Paying Living Expenses: Not hard at all   Food Insecurity: No Food Insecurity    Worried About Running Out of Food in the Last Year: Never true    Ran Out of Food in the Last Year: Never true   Physical Activity: Insufficiently Active    Days of Exercise per Week: 3 days    Minutes of Exercise per Session: 20 min       Family History   Problem Relation Age of Onset    Kidney Disease Mother     Cancer Father        Blood pressure 130/76, pulse 67, resp. rate 20, height 6' 1\" (1.854 m), weight 171 lb 12.8 oz (77.9 kg).     Physical Exam:    General Appearance: alert and oriented to person, place and time, in no acute distress  Cardiovascular: normal rate, regular rhythm, normal S1 and S2, no murmurs, rubs, clicks, or gallops, distal pulses intact, no carotid bruits, no JVD  Pulmonary/Chest: clear to auscultation bilaterally- no wheezes, rales or rhonchi, normal air movement, no respiratory distress  Abdomen: soft, non-tender, non-distended, normal bowel sounds, no masses   Extremities: no cyanosis, clubbing or edema, pulse   Skin: warm and dry, no rash or erythema  Head: normocephalic and atraumatic  Eyes: pupils equal, round, and reactive to light  Neck: supple and non-tender without mass, no thyromegaly   Musculoskeletal: normal range of motion, no joint swelling, deformity or tenderness  Neurological: alert, oriented, normal speech, no focal findings or movement disorder noted    Lab Data:    Cardiac Enzymes:  No results for input(s): CKTOTAL, CKMB, CKMBINDEX, TROPONINI in the last 72 hours. CBC:   Lab Results   Component Value Date/Time    WBC 9.5 09/15/2022 07:26 AM    RBC 4.22 09/15/2022 07:26 AM    HGB 13.0 09/15/2022 07:26 AM    HCT 40.0 09/15/2022 07:26 AM     09/15/2022 07:26 AM       CMP:    Lab Results   Component Value Date/Time     09/15/2022 07:26 AM    K 4.0 09/15/2022 07:26 AM    K 4.5 03/09/2022 05:56 AM    CL 99 09/15/2022 07:26 AM    CO2 27 09/15/2022 07:26 AM    BUN 14 09/15/2022 07:26 AM    CREATININE 0.5 09/15/2022 07:26 AM    LABGLOM >90 09/15/2022 07:26 AM    GLUCOSE 107 09/15/2022 07:26 AM    GLUCOSE 104 05/11/2012 07:14 AM    CALCIUM 11.2 09/15/2022 07:26 AM       Hepatic Function Panel:    Lab Results   Component Value Date/Time    ALKPHOS 93 09/15/2022 07:26 AM    ALT <5 09/15/2022 07:26 AM    AST 23 09/15/2022 07:26 AM    PROT 7.8 09/15/2022 07:26 AM    PROT 7.5 09/14/2016 07:42 AM    BILITOT 0.8 09/15/2022 07:26 AM    BILIDIR <0.2 09/15/2022 07:26 AM    LABALBU 4.7 09/15/2022 07:26 AM    LABALBU 4.7 05/11/2012 07:14 AM       Magnesium:    Lab Results   Component Value Date/Time    MG 2.1 04/17/2018 05:08 AM       PT/INR:    Lab Results   Component Value Date/Time    INR 1.16 03/09/2022 05:56 AM       HgBA1c:    Lab Results   Component Value Date/Time    LABA1C 5.7 09/15/2022 07:25 AM       FLP:    Lab Results   Component Value Date/Time    TRIG 86 09/15/2022 07:26 AM    HDL 49 09/15/2022 07:26 AM    LDLCALC 58 09/15/2022 07:26 AM       TSH:  No results found for: TSH     Diagnosis Orders   1. Complete heart block (HCC)  CBC    Basic Metabolic Panel    Lipid Panel    Hepatic Function Panel      2.  Permanent atrial fibrillation (HCC)  87977 - [place] ELECTROCARDIOGRAM, COMPLETE    CBC    Basic Metabolic Panel    Lipid Panel    Hepatic Function Panel      3. ASHD (arteriosclerotic heart disease)  CBC    Basic Metabolic Panel    Lipid Panel    Hepatic Function Panel      4. Benign non-nodular prostatic hyperplasia without lower urinary tract symptoms  CBC    Basic Metabolic Panel    Lipid Panel    Hepatic Function Panel      5. Essential hypertension  CBC    Basic Metabolic Panel    Lipid Panel    Hepatic Function Panel      6. Dyslipidemia (high LDL; low HDL)  CBC    Basic Metabolic Panel    Lipid Panel    Hepatic Function Panel           Assessment/Plan    Celeste Burnett is an 79years old gentleman who is known to have a history of heart catheterization recently had a heart cath which showed about 80% narrowing of the proximal LAD. He is here for a follow-up the cath findings were discussed with him different option of treatment were discussed with him he indicated he is feeling better now and he has no chest pain he has been able to perform all his activities without any restriction. With this in mind he wants to continue with medical treatment    Had history of pacemaker he is a pacer dependent. Has a history of chronic atrial fibs has been on the Eliquis no GI bleed or bleeding disorders    Has a history of hypercholesterolemia and is on a statin has been under control the patient has a history of hypertension he has been on medication and and under control. Overall patient indicates he is feeling well and he wants to continue with medical treatment.   His cath findings the lab findings the plan of treatment were discussed with the patient in great details all his questions were answered his medication were renewed reconciled and sent to his pharmacy he will be seen periodically follow-up with family physician for long-term care seek medical attention if he had any change in clinical condition thank    Orders Placed This Encounter   Procedures    CBC     Standing Status:   Future     Standing Expiration Date:   6/18/7615    Basic Metabolic Panel     Standing Status:   Future     Standing Expiration Date:   9/19/2023    Lipid Panel     Standing Status:   Future     Standing Expiration Date:   9/19/2023     Order Specific Question:   Is Patient Fasting?/# of Hours     Answer:   12 hours    Hepatic Function Panel     Standing Status:   Future     Standing Expiration Date:   9/19/2023    11395 - [place] ELECTROCARDIOGRAM, COMPLETE       Return in about 6 months (around 3/19/2023) for cad.      Lissett Perez MD

## 2022-09-27 ENCOUNTER — OFFICE VISIT (OUTPATIENT)
Dept: FAMILY MEDICINE CLINIC | Age: 82
End: 2022-09-27
Payer: MEDICARE

## 2022-09-27 VITALS
TEMPERATURE: 97 F | WEIGHT: 174 LBS | DIASTOLIC BLOOD PRESSURE: 70 MMHG | HEIGHT: 73 IN | OXYGEN SATURATION: 98 % | SYSTOLIC BLOOD PRESSURE: 126 MMHG | HEART RATE: 73 BPM | BODY MASS INDEX: 23.06 KG/M2 | RESPIRATION RATE: 18 BRPM

## 2022-09-27 DIAGNOSIS — M25.561 CHRONIC PAIN OF RIGHT KNEE: ICD-10-CM

## 2022-09-27 DIAGNOSIS — G89.29 CHRONIC PAIN OF RIGHT KNEE: ICD-10-CM

## 2022-09-27 DIAGNOSIS — M17.11 LOCALIZED OSTEOARTHRITIS OF RIGHT KNEE: Primary | ICD-10-CM

## 2022-09-27 PROCEDURE — 20610 DRAIN/INJ JOINT/BURSA W/O US: CPT | Performed by: FAMILY MEDICINE

## 2022-09-27 PROCEDURE — 1123F ACP DISCUSS/DSCN MKR DOCD: CPT | Performed by: FAMILY MEDICINE

## 2022-09-27 PROCEDURE — 99213 OFFICE O/P EST LOW 20 MIN: CPT | Performed by: FAMILY MEDICINE

## 2022-09-27 RX ORDER — BUPIVACAINE HYDROCHLORIDE 5 MG/ML
4 INJECTION, SOLUTION PERINEURAL ONCE
Status: COMPLETED | OUTPATIENT
Start: 2022-09-27 | End: 2022-09-27

## 2022-09-27 RX ORDER — LIDOCAINE HYDROCHLORIDE 10 MG/ML
4 INJECTION, SOLUTION INFILTRATION; PERINEURAL ONCE
Status: COMPLETED | OUTPATIENT
Start: 2022-09-27 | End: 2022-09-27

## 2022-09-27 RX ORDER — TRIAMCINOLONE ACETONIDE 40 MG/ML
80 INJECTION, SUSPENSION INTRA-ARTICULAR; INTRAMUSCULAR ONCE
Status: COMPLETED | OUTPATIENT
Start: 2022-09-27 | End: 2022-09-27

## 2022-09-27 RX ADMIN — TRIAMCINOLONE ACETONIDE 80 MG: 40 INJECTION, SUSPENSION INTRA-ARTICULAR; INTRAMUSCULAR at 13:10

## 2022-09-27 RX ADMIN — LIDOCAINE HYDROCHLORIDE 4 ML: 10 INJECTION, SOLUTION INFILTRATION; PERINEURAL at 13:09

## 2022-09-27 RX ADMIN — BUPIVACAINE HYDROCHLORIDE 20 MG: 5 INJECTION, SOLUTION PERINEURAL at 13:08

## 2022-09-27 NOTE — PROGRESS NOTES
[facility]  [facility]  [address] [facility]  Dept: [phone]  Dept Fax: [phone]  Loc: [phone]  PROGRESS NOTE      Visit Date: 9/27/2022    Rizwana Cisneros is a 79 y.o. male who presents today for:  Chief Complaint   Patient presents with    Injections     Right knee injection       Subjective:  HPI     Right knee pain. Steroid injection was done in may and lasted until the last 1 month. Has OA. Uses cane. Uses tylenol. sometimes baclofen helps. Pain is lateral joint line. Worse with prolonged walking. Doing knee HEP. Has tried multiple topical analgesics which cause rashes. No knee swelling. Pain is achy. Pain is 8/10 at its worse. No locking or popping    Review of Systems   Constitutional:  Negative for chills and fever. Musculoskeletal:  Positive for arthralgias. Negative for joint swelling.    Patient Active Problem List   Diagnosis    Atrial fibrillation (HCC)    ASHD (arteriosclerotic heart disease)    CHF (congestive heart failure) ([facility])    Cholelithiasis    Gluten enteropathy    Pacemaker battery depletion    Complete heart block ([address]    Benign non-nodular prostatic hyperplasia without lower urinary tract symptoms    Lumbar degenerative disc disease    Kidney stone on right side    Right inguinal hernia    Diverticulosis of large intestine without hemorrhage    Essential hypertension    Personal history of calcium pyrophosphate deposition disease (CPPD)    Localized osteoarthritis of right knee     Past Medical History:   Diagnosis Date    Arthritis     Atrial fibrillation (HCC)     Atrial fibrillation ([address] 07/08/2020    CAD (coronary artery disease)     CHF (congestive heart failure) ([address]     Dyslipidemia 06/23/2021    Enlarged prostate 06/23/2021    GERD (gastroesophageal reflux disease)     Hyperlipidemia     Hypertension       Past Surgical History:   Procedure Laterality Date    BUNIONECTOMY Left 1990's Ale Montoya      Dr. Emiliano Dimas Left 2015    Dr. Guzman Servant    COLONOSCOPY      x2 Dr. Natalie Waterman      Dr. Dino Jones Right 's    inguinal    Darlys Manish      Dr. Janeth Tomas      Dr. Norman Mireles History   Problem Relation Age of Onset    Kidney Disease Mother     Cancer Father      Social History     Tobacco Use    Smoking status: Former     Packs/day: 0.50     Years: 15.00     Pack years: 7.50     Types: Cigarettes     Quit date: 1965     Years since quittin.7    Smokeless tobacco: Never   Substance Use Topics    Alcohol use: No      Current Outpatient Medications   Medication Sig Dispense Refill    terazosin (HYTRIN) 5 MG capsule Take 5 mg by mouth nightly      carvedilol (COREG) 25 MG tablet Take 1 tablet by mouth 2 times daily (with meals) 180 tablet 3    captopril (CAPOTEN) 25 MG tablet Take 0.5 tablets by mouth 2 times daily 90 tablet 3    ELIQUIS 5 MG TABS tablet Take 1 tablet by mouth 2 times daily 180 tablet 3    hydroCHLOROthiazide (HYDRODIURIL) 25 MG tablet Take 1 tablet by mouth daily 90 tablet 1    baclofen (LIORESAL) 10 MG tablet Take 1 tablet by mouth 3 times daily as needed (muscle spasm) 270 tablet 1    rosuvastatin (CRESTOR) 5 MG tablet Take 1 tablet by mouth daily 90 tablet 1    amLODIPine (NORVASC) 5 MG tablet Take 1 tablet by mouth daily 90 tablet 4    finasteride (PROSCAR) 5 MG tablet Take 1 tablet by mouth daily 90 tablet 3    famotidine (PEPCID) 40 MG tablet Take 40 mg by mouth 2 times daily      mesalamine (DELZICOL) 800 MG TBEC TBEC tablet Take 800 mg by mouth daily       digoxin (LANOXIN) 125 MCG tablet Take 1 tablet by mouth daily. 90 tablet 1    gemfibrozil (LOPID) 600 MG tablet Take 1 tablet by mouth 2 times daily (before meals).  180 tablet 1    Coenzyme Q10 (COQ10) 200 MG CAPS Take 200 mg/day by mouth 2 times daily  180 capsule 1     No current facility-administered medications for this visit. Allergies   Allergen Reactions    Latex Rash    Gluten Meal     Vasotec [Enalaprilat] Other (See Comments)     cough    Neosporin [Neomycin-Polymyxin-Gramicidin] Rash    Sulfa Antibiotics Rash    Voltaren [Diclofenac Sodium] Rash     gel     Health Maintenance   Topic Date Due    Shingles vaccine (1 of 2) Never done    Depression Screen  02/21/2023    Annual Wellness Visit (AWV)  02/22/2023    Lipids  09/15/2023    DTaP/Tdap/Td vaccine (2 - Td or Tdap) 06/09/2027    Flu vaccine  Completed    Pneumococcal 65+ years Vaccine  Completed    COVID-19 Vaccine  Completed    Hepatitis A vaccine  Aged Out    Hepatitis B vaccine  Aged Out    Hib vaccine  Aged Out    Meningococcal (ACWY) vaccine  Aged Out       Objective:  /70   Pulse 73   Temp 97 °F (36.1 °C)   Resp 18   Ht 6' 1\" (1.854 m)   Wt 174 lb (78.9 kg)   SpO2 98%   BMI 22.96 kg/m²   Physical Exam  Vitals reviewed. Constitutional:       General: He is not in acute distress. Appearance: He is not ill-appearing. Neurological:      Mental Status: He is alert. Psychiatric:         Mood and Affect: Mood normal.     Right knee: no effusion. Tenderness of the lateral joint line. Range of motion is from 0 to 120 degrees. Negative valgus and varus. 5 -/5 strength for flexion extension    Right hip -10 degrees IR. Procedure Note right Knee Injection    Discussed risks and benefits of steroid injection, including but not limited to infection, skin discoloration, pain, and bleeding. With the patient's verbal informed consent, his right knee was prepped  in standard sterile fashion with Betadine and Alcohol. Ethyl chloride was used to anesthetize the skin. A mixture of 4 cc of 0.5% Bupivacaine, 4 cc of 1% Lidocaine,  and 2 cc of Kenalog 40 mg was injected into the right anterior lateral joint space.   The patient tolerated this well without difficulty. A band-aid was applied and the patient was advised to ice the knee for 15-20 minutes to relieve any injection site related pain. Impression/Plan:  1. Localized osteoarthritis of right knee  Right knee pain secondary to osteoarthritis. Continue Tylenol. Continue home exercises. X-ray prior to next appointment.  ice. Steroid injection was performed as described above  - XR KNEE RIGHT (MIN 4 VIEWS); Future  - [place] ARTHROCENTESIS ASPIR&/INJ MAJOR JT/BURSA W/O US  - triamcinolone acetonide (KENALOG-40) injection 80 mg  - bupivacaine (MARCAINE) 0.5 % injection 20 mg  - lidocaine 1 % injection 4 mL    2. Chronic pain of right knee  - XR KNEE RIGHT (MIN 4 VIEWS); Future  - [place] ARTHROCENTESIS ASPIR&/INJ MAJOR JT/BURSA W/O US  - triamcinolone acetonide (KENALOG-40) injection 80 mg  - bupivacaine (MARCAINE) 0.5 % injection 20 mg  - lidocaine 1 % injection 4 mL        They voiced understanding. All questions answered. They agreed with treatment plan. See patient instructions for any educational materials that may have been given. Discussed use, benefit, and side effects of prescribed medications. Reviewed health maintenance. (Please note that portions of this note may have been completed with a voice recognition program.  Efforts were made to edit the dictation but occasionally words are mis-transcribed.)    Return if symptoms worsen or fail to improve.       Electronically signed by Thomas Gray MD on 9/27/2022 at 12:04 PM

## 2022-09-30 ENCOUNTER — OFFICE VISIT (OUTPATIENT)
Dept: FAMILY MEDICINE CLINIC | Age: 82
End: 2022-09-30
Payer: MEDICARE

## 2022-09-30 VITALS
DIASTOLIC BLOOD PRESSURE: 70 MMHG | HEART RATE: 74 BPM | OXYGEN SATURATION: 99 % | HEIGHT: 73 IN | BODY MASS INDEX: 23.43 KG/M2 | WEIGHT: 176.8 LBS | SYSTOLIC BLOOD PRESSURE: 134 MMHG | TEMPERATURE: 97.4 F | RESPIRATION RATE: 16 BRPM

## 2022-09-30 DIAGNOSIS — H60.12 CELLULITIS OF LEFT EAR: Primary | ICD-10-CM

## 2022-09-30 PROCEDURE — 99213 OFFICE O/P EST LOW 20 MIN: CPT | Performed by: FAMILY MEDICINE

## 2022-09-30 PROCEDURE — 1123F ACP DISCUSS/DSCN MKR DOCD: CPT | Performed by: FAMILY MEDICINE

## 2022-09-30 RX ORDER — CEPHALEXIN 500 MG/1
500 CAPSULE ORAL 3 TIMES DAILY
Qty: 21 CAPSULE | Refills: 0 | Status: SHIPPED | OUTPATIENT
Start: 2022-09-30 | End: 2022-10-07

## 2022-09-30 NOTE — PROGRESS NOTES
Glynn Santillan (:  1940) is a 79 y.o. male,Established patient, here for evaluation of the following chief complaint(s):  Otalgia (Pt states he has some discomfort in his left ear )         ASSESSMENT/PLAN:  1. Cellulitis of left ear  -     cephALEXin (KEFLEX) 500 MG capsule; Take 1 capsule by mouth 3 times daily for 7 days, Disp-21 capsule, R-0Normal    Return if symptoms worsen or fail to improve. Subjective   SUBJECTIVE/OBJECTIVE:  HPI    Left ear is hurting and also feeling warm for several days. He is not sure what happened. Had some pain in left ear canal and he's not sure if he injured it while cleaning. almost 2 weeks of off and on ear trouble. Normally hearing. No drainage. No cold symptoms. No fever/chills. Review of Systems    No fever/chills    Objective   Physical Exam    Gen: NAD, AAO x 3, coherent, pleasant    Conjunctiva clear bilaterally. Left ear mildly swollen and erythematous with mild tenderness with exam. Canal distally with mild erythema with a small bruise on inferior aspect, no drainage. An electronic signature was used to authenticate this note.     --Lazarus Osgood, MD

## 2022-10-12 ENCOUNTER — OFFICE VISIT (OUTPATIENT)
Dept: FAMILY MEDICINE CLINIC | Age: 82
End: 2022-10-12
Payer: MEDICARE

## 2022-10-12 VITALS
WEIGHT: 176 LBS | HEIGHT: 73 IN | TEMPERATURE: 97.8 F | BODY MASS INDEX: 23.33 KG/M2 | HEART RATE: 60 BPM | SYSTOLIC BLOOD PRESSURE: 136 MMHG | RESPIRATION RATE: 18 BRPM | OXYGEN SATURATION: 99 % | DIASTOLIC BLOOD PRESSURE: 76 MMHG

## 2022-10-12 DIAGNOSIS — H66.002 ACUTE SUPPURATIVE OTITIS MEDIA OF LEFT EAR WITHOUT SPONTANEOUS RUPTURE OF TYMPANIC MEMBRANE, RECURRENCE NOT SPECIFIED: Primary | ICD-10-CM

## 2022-10-12 PROCEDURE — 1123F ACP DISCUSS/DSCN MKR DOCD: CPT | Performed by: NURSE PRACTITIONER

## 2022-10-12 PROCEDURE — 99213 OFFICE O/P EST LOW 20 MIN: CPT | Performed by: NURSE PRACTITIONER

## 2022-10-12 RX ORDER — CEFDINIR 300 MG/1
300 CAPSULE ORAL 2 TIMES DAILY
Qty: 14 CAPSULE | Refills: 0 | Status: SHIPPED | OUTPATIENT
Start: 2022-10-12 | End: 2022-10-19

## 2022-10-12 RX ORDER — CETIRIZINE HYDROCHLORIDE 10 MG/1
10 TABLET ORAL DAILY
Qty: 30 TABLET | Refills: 0 | Status: SHIPPED | OUTPATIENT
Start: 2022-10-12

## 2022-10-12 ASSESSMENT — ENCOUNTER SYMPTOMS
SINUS PAIN: 0
RESPIRATORY NEGATIVE: 1
SORE THROAT: 1
GASTROINTESTINAL NEGATIVE: 1

## 2022-10-12 ASSESSMENT — PATIENT HEALTH QUESTIONNAIRE - PHQ9
1. LITTLE INTEREST OR PLEASURE IN DOING THINGS: 0
SUM OF ALL RESPONSES TO PHQ QUESTIONS 1-9: 0
SUM OF ALL RESPONSES TO PHQ9 QUESTIONS 1 & 2: 0
SUM OF ALL RESPONSES TO PHQ QUESTIONS 1-9: 0
2. FEELING DOWN, DEPRESSED OR HOPELESS: 0

## 2022-10-12 NOTE — PROGRESS NOTES
Narendra Joiner (:  1940) is a 79 y.o. male,Established patient, here for evaluation of the following chief complaint(s):  Pharyngitis and Otalgia (Left ear more than right x 2 days)         ASSESSMENT/PLAN:  1. Acute suppurative otitis media of left ear without spontaneous rupture of tympanic membrane, recurrence not specified  -     cefdinir (OMNICEF) 300 MG capsule; Take 1 capsule by mouth 2 times daily for 7 days, Disp-14 capsule, R-0Normal  -     cetirizine (ZYRTEC) 10 MG tablet; Take 1 tablet by mouth daily, Disp-30 tablet, R-0Normal    Return if symptoms worsen or fail to improve.     -continue taking the probiotics  -OTC throat lozenges as needed.   -zyrtec for 2 weeks. -increase fluids. Subjective   SUBJECTIVE/OBJECTIVE:  MIGUEL  Supriya Vega is here for evaluation of sore throat and left ear pain. He was treated for cellulitis of the left ear 2 weeks ago but this has not resolved. Painful swallowing. No cough. Review of Systems   Constitutional:  Negative for chills and fever. HENT:  Positive for ear pain, postnasal drip and sore throat. Negative for congestion and sinus pain. Respiratory: Negative. Cardiovascular: Negative. Gastrointestinal: Negative. Musculoskeletal: Negative. Objective   Physical Exam  Vitals reviewed. Constitutional:       General: He is not in acute distress. Appearance: Normal appearance. He is normal weight. He is not ill-appearing. HENT:      Head: Normocephalic and atraumatic. Right Ear: Hearing, tympanic membrane, ear canal and external ear normal.      Left Ear: Hearing, ear canal and external ear normal. Tympanic membrane is erythematous and bulging. Nose: Nose normal.      Right Turbinates: Swollen. Left Turbinates: Swollen. Mouth/Throat:      Lips: Pink. Mouth: Mucous membranes are moist.      Pharynx: Posterior oropharyngeal erythema present.    Cardiovascular:      Rate and Rhythm: Normal rate and regular rhythm. Pulses: Normal pulses. Heart sounds: Normal heart sounds. Pulmonary:      Effort: Pulmonary effort is normal.      Breath sounds: Normal breath sounds. Skin:     General: Skin is warm and dry. Neurological:      General: No focal deficit present. Mental Status: He is alert and oriented to person, place, and time. An electronic signature was used to authenticate this note.     --Abran Jenkins, SID - CNP

## 2022-11-08 PROBLEM — Z95.0 PACEMAKER: Status: ACTIVE | Noted: 2022-11-08

## 2022-11-21 ENCOUNTER — TELEPHONE (OUTPATIENT)
Dept: CARDIOLOGY CLINIC | Age: 82
End: 2022-11-21

## 2022-11-21 RX ORDER — ISOSORBIDE MONONITRATE 30 MG/1
30 TABLET, EXTENDED RELEASE ORAL DAILY
COMMUNITY
Start: 2022-11-21

## 2022-11-21 NOTE — TELEPHONE ENCOUNTER
Effie davis was started on Norvasc and he is having some swelling of the ear and feet. Dr. Unique Floyd stopped the Norvasc and started Imdur 30mg/qd.

## 2022-11-28 ENCOUNTER — TELEPHONE (OUTPATIENT)
Dept: FAMILY MEDICINE CLINIC | Age: 82
End: 2022-11-28

## 2022-11-28 NOTE — TELEPHONE ENCOUNTER
Patient came in wanting to inform you that Dr. Marlon Baum put him on AMLODIPINE BESYLATE 5 MG Tablets for his heart. He had problems with swelling in his feet and ankles. ( His right one is worse than left). He called over to them and they switched him to ISOSORBIDE MONONIT ER 30 MG Tablets. The swelling came down it was slight. They just wanted him to inform his PCP so you know.

## 2022-11-29 ENCOUNTER — HOSPITAL ENCOUNTER (OUTPATIENT)
Age: 82
Discharge: HOME | End: 2022-11-29
Payer: MEDICARE

## 2022-11-29 ENCOUNTER — HOSPITAL ENCOUNTER (OUTPATIENT)
Age: 82
End: 2022-11-29

## 2022-11-29 LAB — C DIFF TOXIN/ANTIGEN: NEGATIVE

## 2022-11-29 PROCEDURE — 87427 SHIGA-LIKE TOXIN AG IA: CPT

## 2022-11-29 PROCEDURE — 87449 NOS EACH ORGANISM AG IA: CPT

## 2022-11-29 PROCEDURE — 87045 FECES CULTURE AEROBIC BACT: CPT

## 2022-12-01 LAB — CULTURE, STOOL: NORMAL

## 2022-12-07 ENCOUNTER — TELEPHONE (OUTPATIENT)
Dept: CARDIOLOGY CLINIC | Age: 82
End: 2022-12-07

## 2022-12-07 RX ORDER — BUMETANIDE 1 MG/1
1 TABLET ORAL DAILY
COMMUNITY
Start: 2022-12-07

## 2022-12-07 RX ORDER — POTASSIUM CHLORIDE 750 MG/1
10 TABLET, EXTENDED RELEASE ORAL DAILY
COMMUNITY
Start: 2022-12-07

## 2022-12-07 NOTE — TELEPHONE ENCOUNTER
Divina Downing called stating he is still having leg swelling even after stopping the Norvasc.   Dr. Corey Duggan prescribed Bumex 1mg/qd and K-Dur 10meq/qd

## 2022-12-16 ENCOUNTER — PROCEDURE VISIT (OUTPATIENT)
Dept: CARDIOLOGY CLINIC | Age: 82
End: 2022-12-16

## 2022-12-16 ENCOUNTER — TELEPHONE (OUTPATIENT)
Dept: CARDIOLOGY CLINIC | Age: 82
End: 2022-12-16

## 2022-12-16 DIAGNOSIS — Z95.0 PACEMAKER: Primary | ICD-10-CM

## 2022-12-16 NOTE — PROGRESS NOTES
[employer] medtronic dual pacer    . Chavo Chacon Battery longevity:  3.5 years on device   Presenting rhythm  AF     Atrial impedance 559  RV impedance 492    P wave sensing 0.35  R wave sensing 11.2-16    34.4 % atrial paced  73.4 % RV paced     Atrial threshold 0.75 V  at 0.4ms  RV threshold 1.125 V at 0.4ms  Mode switches   known AF/eliquis

## 2022-12-21 ENCOUNTER — TELEPHONE (OUTPATIENT)
Dept: CARDIOLOGY CLINIC | Age: 82
End: 2022-12-21

## 2022-12-27 ENCOUNTER — HOSPITAL ENCOUNTER (OUTPATIENT)
Age: 82
Discharge: HOME | End: 2022-12-27
Payer: MEDICARE

## 2022-12-27 ENCOUNTER — OFFICE VISIT (OUTPATIENT)
Dept: FAMILY MEDICINE CLINIC | Age: 82
End: 2022-12-27
Payer: MEDICARE

## 2022-12-27 VITALS
SYSTOLIC BLOOD PRESSURE: 108 MMHG | BODY MASS INDEX: 20.99 KG/M2 | OXYGEN SATURATION: 99 % | HEART RATE: 60 BPM | TEMPERATURE: 98.2 F | HEIGHT: 73 IN | DIASTOLIC BLOOD PRESSURE: 58 MMHG | RESPIRATION RATE: 16 BRPM | WEIGHT: 158.4 LBS

## 2022-12-27 DIAGNOSIS — E86.0 DEHYDRATION: ICD-10-CM

## 2022-12-27 DIAGNOSIS — R63.0 DECREASED APPETITE: ICD-10-CM

## 2022-12-27 DIAGNOSIS — R63.4 WEIGHT LOSS: Primary | ICD-10-CM

## 2022-12-27 DIAGNOSIS — R63.4 WEIGHT LOSS: ICD-10-CM

## 2022-12-27 LAB
ALBUMIN SERPL-MCNC: 4.9 G/DL (ref 3.5–5.1)
ANION GAP SERPL CALCULATED.3IONS-SCNC: 14 MEQ/L (ref 8–16)
BUN BLDV-MCNC: 23 MG/DL (ref 7–22)
CALCIUM SERPL-MCNC: 11.7 MG/DL (ref 8.5–10.5)
CHLORIDE BLD-SCNC: 96 MEQ/L (ref 98–111)
CO2: 28 MEQ/L (ref 23–33)
CREAT SERPL-MCNC: 0.5 MG/DL (ref 0.4–1.2)
ERYTHROCYTE [DISTWIDTH] IN BLOOD BY AUTOMATED COUNT: 14.8 % (ref 11.5–14.5)
ERYTHROCYTE [DISTWIDTH] IN BLOOD BY AUTOMATED COUNT: 50.4 FL (ref 35–45)
GFR SERPL CREATININE-BSD FRML MDRD: > 60 ML/MIN/1.73M2
GLUCOSE BLD-MCNC: 128 MG/DL (ref 70–108)
HCT VFR BLD CALC: 39.6 % (ref 42–52)
HEMOGLOBIN: 12.9 GM/DL (ref 14–18)
MCH RBC QN AUTO: 30.4 PG (ref 26–33)
MCHC RBC AUTO-ENTMCNC: 32.6 GM/DL (ref 32.2–35.5)
MCV RBC AUTO: 93.4 FL (ref 80–94)
PHOSPHORUS: 2.1 MG/DL (ref 2.4–4.7)
PLATELET # BLD: 227 THOU/MM3 (ref 130–400)
PMV BLD AUTO: 10.5 FL (ref 9.4–12.4)
POTASSIUM SERPL-SCNC: 4.1 MEQ/L (ref 3.5–5.2)
RBC # BLD: 4.24 MILL/MM3 (ref 4.7–6.1)
SODIUM BLD-SCNC: 138 MEQ/L (ref 135–145)
WBC # BLD: 14.2 THOU/MM3 (ref 4.8–10.8)

## 2022-12-27 PROCEDURE — 3078F DIAST BP <80 MM HG: CPT | Performed by: NURSE PRACTITIONER

## 2022-12-27 PROCEDURE — 85027 COMPLETE CBC AUTOMATED: CPT

## 2022-12-27 PROCEDURE — 3074F SYST BP LT 130 MM HG: CPT | Performed by: NURSE PRACTITIONER

## 2022-12-27 PROCEDURE — 36415 COLL VENOUS BLD VENIPUNCTURE: CPT

## 2022-12-27 PROCEDURE — 1123F ACP DISCUSS/DSCN MKR DOCD: CPT | Performed by: NURSE PRACTITIONER

## 2022-12-27 PROCEDURE — 80069 RENAL FUNCTION PANEL: CPT

## 2022-12-27 PROCEDURE — 99214 OFFICE O/P EST MOD 30 MIN: CPT | Performed by: NURSE PRACTITIONER

## 2022-12-27 RX ORDER — BUDESONIDE 3 MG/1
CAPSULE, COATED PELLETS ORAL
COMMUNITY
Start: 2022-12-12

## 2022-12-27 ASSESSMENT — PATIENT HEALTH QUESTIONNAIRE - PHQ9
1. LITTLE INTEREST OR PLEASURE IN DOING THINGS: 0
SUM OF ALL RESPONSES TO PHQ QUESTIONS 1-9: 0
SUM OF ALL RESPONSES TO PHQ QUESTIONS 1-9: 0
2. FEELING DOWN, DEPRESSED OR HOPELESS: 0
SUM OF ALL RESPONSES TO PHQ QUESTIONS 1-9: 0
SUM OF ALL RESPONSES TO PHQ9 QUESTIONS 1 & 2: 0
SUM OF ALL RESPONSES TO PHQ QUESTIONS 1-9: 0

## 2022-12-27 ASSESSMENT — ENCOUNTER SYMPTOMS: RESPIRATORY NEGATIVE: 1

## 2022-12-27 NOTE — PROGRESS NOTES
Mert Das (:  1940) is a 79 y.o. male,Established patient, here for evaluation of the following chief complaint(s):  Weight Loss (Patient believes is due to wrong medication) and Leg Swelling (More in the right)         ASSESSMENT/PLAN:  1. Weight loss  -     CBC; Future  -     Renal Function Panel; Future  2. Dehydration  -     CBC; Future  -     Renal Function Panel; Future  3. Decreased appetite  -     CBC; Future  -     Renal Function Panel; Future    Return in about 1 week (around 1/3/2023) for weight check. Stop taking Hydrochlorothiazide. Continue taking your blood pressure daily. Call if your blood pressure goes below 100/50. Continue taking Bumex 1 mg daily    Come to the office in 1 week for a weight and BP check. He notes a 2 week history of diarrhea for which his gastroenterologist prescribed Entocort on 22. He notes the diarrhea has improved. Question if this could be causing the anorexia, patient states that he is sure that his decreased appetite started with Norvasc and Bumex. However, if his symptoms do not improve, I would recommend he DC this medication. Will check some lab, recommend he call Dr. Cori Boyd for FU on the Bumex, otherwise if he continues losing weight and appears dry, will decrease Bumex to 0.5 mg daily. Subjective   SUBJECTIVE/OBJECTIVE:  MIGUEL Bourne presents for evaluation of weight loss. He has been following with Dr. Cori Boyd and developed worsening bilateral lower extremity edema and decreased appetite after starting Amlodipine in September. Amlodipine was stopped and Imdur added, but he continued to have edema. He was then given Bumex and K-Dur. He noticed rapid weight loss after starting Bumex 1 mg on 22. 20 pound weight reduction since he was last seen in . He has been supplementing his intake with Boost. He complains of increased thirst. He has noticed BPs at home ranging in low 100s/50s.  He has occasional dizziness upon standing. Review of Systems   Constitutional:  Positive for appetite change. HENT: Negative. Respiratory: Negative. Cardiovascular: Negative. Gastrointestinal:  Positive for diarrhea and nausea. Negative for abdominal pain and vomiting. Neurological:  Positive for dizziness. Objective   Physical Exam  Constitutional:       General: He is not in acute distress. Appearance: Normal appearance. Eyes:      Extraocular Movements: Extraocular movements intact. Pupils: Pupils are equal, round, and reactive to light. Cardiovascular:      Rate and Rhythm: Normal rate and regular rhythm. Pulses: Normal pulses. Heart sounds: Normal heart sounds. Pulmonary:      Effort: Pulmonary effort is normal.      Breath sounds: Normal breath sounds. Musculoskeletal:         General: Swelling present. Right lower leg: Edema (non-pitting 1+ centralized to the ankle) present. Left lower leg: No edema. Skin:     General: Skin is warm and dry. Capillary Refill: Capillary refill takes less than 2 seconds. Neurological:      General: No focal deficit present. Mental Status: He is alert and oriented to person, place, and time. An electronic signature was used to authenticate this note.     --Nic Hanson, SID - CNP

## 2022-12-27 NOTE — PATIENT INSTRUCTIONS
Stop taking Hydrochlorothiazide. Continue taking your blood pressure daily. Call if your blood pressure goes below 100/50. Continue taking Bumex 1 mg daily. Come to the office in 1 week for a weight check.

## 2022-12-28 ASSESSMENT — ENCOUNTER SYMPTOMS
NAUSEA: 1
DIARRHEA: 1
VOMITING: 0
ABDOMINAL PAIN: 0

## 2023-01-01 ENCOUNTER — HOSPITAL ENCOUNTER (INPATIENT)
Age: 83
LOS: 3 days | Discharge: NURSING, REHAB OR LONG-TERM CARE | DRG: 640 | End: 2023-04-14
Attending: STUDENT IN AN ORGANIZED HEALTH CARE EDUCATION/TRAINING PROGRAM | Admitting: PHYSICIAN ASSISTANT
Payer: MEDICARE

## 2023-01-01 ENCOUNTER — APPOINTMENT (OUTPATIENT)
Dept: CT IMAGING | Age: 83
DRG: 640 | End: 2023-01-01
Payer: MEDICARE

## 2023-01-01 ENCOUNTER — APPOINTMENT (OUTPATIENT)
Dept: GENERAL RADIOLOGY | Age: 83
DRG: 640 | End: 2023-01-01
Payer: MEDICARE

## 2023-01-01 VITALS
DIASTOLIC BLOOD PRESSURE: 66 MMHG | SYSTOLIC BLOOD PRESSURE: 109 MMHG | RESPIRATION RATE: 14 BRPM | HEART RATE: 58 BPM | OXYGEN SATURATION: 98 % | WEIGHT: 153.88 LBS | HEIGHT: 73 IN | TEMPERATURE: 97 F | BODY MASS INDEX: 20.39 KG/M2

## 2023-01-01 DIAGNOSIS — R41.82 ALTERED MENTAL STATUS, UNSPECIFIED ALTERED MENTAL STATUS TYPE: Primary | ICD-10-CM

## 2023-01-01 DIAGNOSIS — D72.829 LEUKOCYTOSIS, UNSPECIFIED TYPE: ICD-10-CM

## 2023-01-01 DIAGNOSIS — E83.52 HYPERCALCEMIA: ICD-10-CM

## 2023-01-01 DIAGNOSIS — M62.838 MUSCLE SPASM: ICD-10-CM

## 2023-01-01 LAB
ALBUMIN SERPL BCG-MCNC: 3 G/DL (ref 3.5–5.1)
ALBUMIN SERPL BCG-MCNC: 3 G/DL (ref 3.5–5.1)
ALP SERPL-CCNC: 162 U/L (ref 38–126)
ALP SERPL-CCNC: 168 U/L (ref 38–126)
ALT SERPL W/O P-5'-P-CCNC: 7 U/L (ref 11–66)
ALT SERPL W/O P-5'-P-CCNC: 7 U/L (ref 11–66)
AMMONIA PLAS-MCNC: 38 UMOL/L (ref 11–60)
ANION GAP SERPL CALC-SCNC: 10 MEQ/L (ref 8–16)
APTT PPP: 31.4 SECONDS (ref 22–38)
AST SERPL-CCNC: 29 U/L (ref 5–40)
AST SERPL-CCNC: 30 U/L (ref 5–40)
BACTERIA BLD AEROBE CULT: NORMAL
BACTERIA BLD AEROBE CULT: NORMAL
BACTERIA URNS QL MICRO: ABNORMAL /HPF
BASOPHILS ABSOLUTE: 0.1 THOU/MM3 (ref 0–0.1)
BASOPHILS ABSOLUTE: 0.1 THOU/MM3 (ref 0–0.1)
BASOPHILS NFR BLD AUTO: 0.5 %
BASOPHILS NFR BLD AUTO: 0.5 %
BILIRUB SERPL-MCNC: 0.6 MG/DL (ref 0.3–1.2)
BILIRUB SERPL-MCNC: 0.7 MG/DL (ref 0.3–1.2)
BILIRUB UR QL STRIP.AUTO: ABNORMAL
BUN SERPL-MCNC: 30 MG/DL (ref 7–22)
BUN SERPL-MCNC: 31 MG/DL (ref 7–22)
BUN SERPL-MCNC: 37 MG/DL (ref 7–22)
CA-I BLD ISE-SCNC: 1.38 MMOL/L (ref 1.12–1.32)
CA-I BLD ISE-SCNC: 1.57 MMOL/L (ref 1.12–1.32)
CALCIUM SERPL-MCNC: 11.7 MG/DL (ref 8.5–10.5)
CALCIUM SERPL-MCNC: 11.9 MG/DL (ref 8.5–10.5)
CALCIUM SERPL-MCNC: 12.3 MG/DL (ref 8.5–10.5)
CASTS #/AREA URNS LPF: ABNORMAL /LPF
CASTS 2: ABNORMAL /LPF
CHARACTER UR: ABNORMAL
CHLORIDE SERPL-SCNC: 103 MEQ/L (ref 98–111)
CHLORIDE SERPL-SCNC: 105 MEQ/L (ref 98–111)
CHLORIDE SERPL-SCNC: 95 MEQ/L (ref 98–111)
CO2 SERPL-SCNC: 23 MEQ/L (ref 23–33)
CO2 SERPL-SCNC: 23 MEQ/L (ref 23–33)
CO2 SERPL-SCNC: 26 MEQ/L (ref 23–33)
COLOR: ABNORMAL
CREAT SERPL-MCNC: 0.5 MG/DL (ref 0.4–1.2)
CREAT SERPL-MCNC: 0.6 MG/DL (ref 0.4–1.2)
CREAT SERPL-MCNC: 0.7 MG/DL (ref 0.4–1.2)
CRYSTALS URNS MICRO: ABNORMAL
DEPRECATED RDW RBC AUTO: 52.3 FL (ref 35–45)
DEPRECATED RDW RBC AUTO: 53.2 FL (ref 35–45)
EOSINOPHIL NFR BLD AUTO: 8.5 %
EOSINOPHIL NFR BLD AUTO: 8.6 %
EOSINOPHILS ABSOLUTE: 1.6 THOU/MM3 (ref 0–0.4)
EOSINOPHILS ABSOLUTE: 1.6 THOU/MM3 (ref 0–0.4)
EPITHELIAL CELLS, UA: ABNORMAL /HPF
ERYTHROCYTE [DISTWIDTH] IN BLOOD BY AUTOMATED COUNT: 15.2 % (ref 11.5–14.5)
ERYTHROCYTE [DISTWIDTH] IN BLOOD BY AUTOMATED COUNT: 15.3 % (ref 11.5–14.5)
GFR SERPL CREATININE-BSD FRML MDRD: > 60 ML/MIN/1.73M2
GLUCOSE SERPL-MCNC: 100 MG/DL (ref 70–108)
GLUCOSE SERPL-MCNC: 120 MG/DL (ref 70–108)
GLUCOSE SERPL-MCNC: 83 MG/DL (ref 70–108)
GLUCOSE UR QL STRIP.AUTO: NEGATIVE MG/DL
HCT VFR BLD AUTO: 32.2 % (ref 42–52)
HCT VFR BLD AUTO: 35.6 % (ref 42–52)
HGB BLD-MCNC: 10.3 GM/DL (ref 14–18)
HGB BLD-MCNC: 11.2 GM/DL (ref 14–18)
HGB UR QL STRIP.AUTO: ABNORMAL
ICTOTEST: NEGATIVE
IMM GRANULOCYTES # BLD AUTO: 0.1 THOU/MM3 (ref 0–0.07)
IMM GRANULOCYTES # BLD AUTO: 0.1 THOU/MM3 (ref 0–0.07)
IMM GRANULOCYTES NFR BLD AUTO: 0.5 %
IMM GRANULOCYTES NFR BLD AUTO: 0.5 %
INR PPP: 1.55 (ref 0.85–1.13)
KETONES UR QL STRIP.AUTO: NEGATIVE
LACTIC ACID, SEPSIS: 1.2 MMOL/L (ref 0.5–1.9)
LYMPHOCYTES ABSOLUTE: 1.2 THOU/MM3 (ref 1–4.8)
LYMPHOCYTES ABSOLUTE: 1.2 THOU/MM3 (ref 1–4.8)
LYMPHOCYTES NFR BLD AUTO: 6 %
LYMPHOCYTES NFR BLD AUTO: 6.3 %
MCH RBC QN AUTO: 30.1 PG (ref 26–33)
MCH RBC QN AUTO: 30.3 PG (ref 26–33)
MCHC RBC AUTO-ENTMCNC: 31.5 GM/DL (ref 32.2–35.5)
MCHC RBC AUTO-ENTMCNC: 32 GM/DL (ref 32.2–35.5)
MCV RBC AUTO: 94.7 FL (ref 80–94)
MCV RBC AUTO: 95.7 FL (ref 80–94)
MISCELLANEOUS 2: ABNORMAL
MONOCYTES ABSOLUTE: 1.2 THOU/MM3 (ref 0.4–1.3)
MONOCYTES ABSOLUTE: 1.2 THOU/MM3 (ref 0.4–1.3)
MONOCYTES NFR BLD AUTO: 6 %
MONOCYTES NFR BLD AUTO: 6.6 %
NEUTROPHILS NFR BLD AUTO: 77.5 %
NEUTROPHILS NFR BLD AUTO: 78.5 %
NITRITE UR QL STRIP: NEGATIVE
NRBC BLD AUTO-RTO: 0 /100 WBC
NRBC BLD AUTO-RTO: 0 /100 WBC
OSMOLALITY SERPL CALC.SUM OF ELEC: 272.5 MOSMOL/KG (ref 275–300)
PH UR STRIP.AUTO: 5 [PH] (ref 5–9)
PLATELET # BLD AUTO: 246 THOU/MM3 (ref 130–400)
PLATELET # BLD AUTO: 247 THOU/MM3 (ref 130–400)
PMV BLD AUTO: 10 FL (ref 9.4–12.4)
PMV BLD AUTO: 9.9 FL (ref 9.4–12.4)
POTASSIUM SERPL-SCNC: 3.6 MEQ/L (ref 3.5–5.2)
POTASSIUM SERPL-SCNC: 3.7 MEQ/L (ref 3.5–5.2)
POTASSIUM SERPL-SCNC: 4 MEQ/L (ref 3.5–5.2)
PROT SERPL-MCNC: 5.3 G/DL (ref 6.1–8)
PROT SERPL-MCNC: 5.8 G/DL (ref 6.1–8)
PROT UR STRIP.AUTO-MCNC: ABNORMAL MG/DL
RBC # BLD AUTO: 3.4 MILL/MM3 (ref 4.7–6.1)
RBC # BLD AUTO: 3.72 MILL/MM3 (ref 4.7–6.1)
RBC URINE: ABNORMAL /HPF
RENAL EPI CELLS #/AREA URNS HPF: ABNORMAL /[HPF]
SARS-COV-2 RDRP RESP QL NAA+PROBE: NOT  DETECTED
SARS-COV-2 RDRP RESP QL NAA+PROBE: NOT  DETECTED
SEGMENTED NEUTROPHILS ABSOLUTE COUNT: 14.4 THOU/MM3 (ref 1.8–7.7)
SEGMENTED NEUTROPHILS ABSOLUTE COUNT: 15.2 THOU/MM3 (ref 1.8–7.7)
SODIUM SERPL-SCNC: 131 MEQ/L (ref 135–145)
SODIUM SERPL-SCNC: 136 MEQ/L (ref 135–145)
SODIUM SERPL-SCNC: 138 MEQ/L (ref 135–145)
SP GR UR REFRACT.AUTO: > 1.03 (ref 1–1.03)
TROPONIN T: 0.01 NG/ML
TROPONIN T: < 0.01 NG/ML
UROBILINOGEN, URINE: 1 EU/DL (ref 0–1)
WBC # BLD AUTO: 18.6 THOU/MM3 (ref 4.8–10.8)
WBC # BLD AUTO: 19.3 THOU/MM3 (ref 4.8–10.8)
WBC #/AREA URNS HPF: ABNORMAL /HPF
WBC #/AREA URNS HPF: NEGATIVE /[HPF]
YEAST LIKE FUNGI URNS QL MICRO: ABNORMAL

## 2023-01-01 PROCEDURE — 92610 EVALUATE SWALLOWING FUNCTION: CPT

## 2023-01-01 PROCEDURE — 82330 ASSAY OF CALCIUM: CPT

## 2023-01-01 PROCEDURE — 6370000000 HC RX 637 (ALT 250 FOR IP): Performed by: PHYSICIAN ASSISTANT

## 2023-01-01 PROCEDURE — 99239 HOSP IP/OBS DSCHRG MGMT >30: CPT | Performed by: INTERNAL MEDICINE

## 2023-01-01 PROCEDURE — 99233 SBSQ HOSP IP/OBS HIGH 50: CPT | Performed by: INTERNAL MEDICINE

## 2023-01-01 PROCEDURE — 70496 CT ANGIOGRAPHY HEAD: CPT

## 2023-01-01 PROCEDURE — 2060000000 HC ICU INTERMEDIATE R&B

## 2023-01-01 PROCEDURE — 99285 EMERGENCY DEPT VISIT HI MDM: CPT

## 2023-01-01 PROCEDURE — 6360000002 HC RX W HCPCS: Performed by: STUDENT IN AN ORGANIZED HEALTH CARE EDUCATION/TRAINING PROGRAM

## 2023-01-01 PROCEDURE — 80053 COMPREHEN METABOLIC PANEL: CPT

## 2023-01-01 PROCEDURE — 93005 ELECTROCARDIOGRAM TRACING: CPT | Performed by: STUDENT IN AN ORGANIZED HEALTH CARE EDUCATION/TRAINING PROGRAM

## 2023-01-01 PROCEDURE — 2580000003 HC RX 258: Performed by: STUDENT IN AN ORGANIZED HEALTH CARE EDUCATION/TRAINING PROGRAM

## 2023-01-01 PROCEDURE — 36415 COLL VENOUS BLD VENIPUNCTURE: CPT

## 2023-01-01 PROCEDURE — 97166 OT EVAL MOD COMPLEX 45 MIN: CPT

## 2023-01-01 PROCEDURE — 2580000003 HC RX 258: Performed by: PHYSICIAN ASSISTANT

## 2023-01-01 PROCEDURE — 71045 X-RAY EXAM CHEST 1 VIEW: CPT

## 2023-01-01 PROCEDURE — 85025 COMPLETE CBC W/AUTO DIFF WBC: CPT

## 2023-01-01 PROCEDURE — 81001 URINALYSIS AUTO W/SCOPE: CPT

## 2023-01-01 PROCEDURE — 85730 THROMBOPLASTIN TIME PARTIAL: CPT

## 2023-01-01 PROCEDURE — 96361 HYDRATE IV INFUSION ADD-ON: CPT

## 2023-01-01 PROCEDURE — 99231 SBSQ HOSP IP/OBS SF/LOW 25: CPT | Performed by: SOCIAL WORKER

## 2023-01-01 PROCEDURE — 6360000004 HC RX CONTRAST MEDICATION: Performed by: STUDENT IN AN ORGANIZED HEALTH CARE EDUCATION/TRAINING PROGRAM

## 2023-01-01 PROCEDURE — 83605 ASSAY OF LACTIC ACID: CPT

## 2023-01-01 PROCEDURE — 85610 PROTHROMBIN TIME: CPT

## 2023-01-01 PROCEDURE — 87635 SARS-COV-2 COVID-19 AMP PRB: CPT

## 2023-01-01 PROCEDURE — 87040 BLOOD CULTURE FOR BACTERIA: CPT

## 2023-01-01 PROCEDURE — 70498 CT ANGIOGRAPHY NECK: CPT

## 2023-01-01 PROCEDURE — 92526 ORAL FUNCTION THERAPY: CPT

## 2023-01-01 PROCEDURE — 70450 CT HEAD/BRAIN W/O DYE: CPT

## 2023-01-01 PROCEDURE — 6360000002 HC RX W HCPCS: Performed by: INTERNAL MEDICINE

## 2023-01-01 PROCEDURE — 97535 SELF CARE MNGMENT TRAINING: CPT

## 2023-01-01 PROCEDURE — 82140 ASSAY OF AMMONIA: CPT

## 2023-01-01 PROCEDURE — 93010 ELECTROCARDIOGRAM REPORT: CPT | Performed by: INTERNAL MEDICINE

## 2023-01-01 PROCEDURE — 95819 EEG AWAKE AND ASLEEP: CPT

## 2023-01-01 PROCEDURE — 84484 ASSAY OF TROPONIN QUANT: CPT

## 2023-01-01 PROCEDURE — 80048 BASIC METABOLIC PNL TOTAL CA: CPT

## 2023-01-01 PROCEDURE — 96360 HYDRATION IV INFUSION INIT: CPT

## 2023-01-01 PROCEDURE — 99233 SBSQ HOSP IP/OBS HIGH 50: CPT | Performed by: PHYSICIAN ASSISTANT

## 2023-01-01 RX ORDER — BUMETANIDE 1 MG/1
2 TABLET ORAL DAILY PRN
Status: DISCONTINUED | OUTPATIENT
Start: 2023-01-01 | End: 2023-01-01 | Stop reason: HOSPADM

## 2023-01-01 RX ORDER — ROSUVASTATIN CALCIUM 10 MG/1
5 TABLET, COATED ORAL DAILY
Status: DISCONTINUED | OUTPATIENT
Start: 2023-01-01 | End: 2023-01-01 | Stop reason: HOSPADM

## 2023-01-01 RX ORDER — LORAZEPAM 2 MG/ML
0.5 INJECTION INTRAMUSCULAR EVERY 4 HOURS PRN
Status: DISCONTINUED | OUTPATIENT
Start: 2023-01-01 | End: 2023-01-01 | Stop reason: HOSPADM

## 2023-01-01 RX ORDER — MULTIVITAMIN WITH IRON
1 TABLET ORAL DAILY
Qty: 30 TABLET | Refills: 0 | Status: SHIPPED | OUTPATIENT
Start: 2023-01-01 | End: 2023-05-14

## 2023-01-01 RX ORDER — SODIUM CHLORIDE, SODIUM LACTATE, POTASSIUM CHLORIDE, CALCIUM CHLORIDE 600; 310; 30; 20 MG/100ML; MG/100ML; MG/100ML; MG/100ML
INJECTION, SOLUTION INTRAVENOUS CONTINUOUS
Status: DISCONTINUED | OUTPATIENT
Start: 2023-01-01 | End: 2023-01-01

## 2023-01-01 RX ORDER — PANTOPRAZOLE SODIUM 40 MG/1
40 TABLET, DELAYED RELEASE ORAL DAILY
Status: DISCONTINUED | OUTPATIENT
Start: 2023-01-01 | End: 2023-01-01 | Stop reason: HOSPADM

## 2023-01-01 RX ORDER — POTASSIUM CHLORIDE 20 MEQ/1
40 TABLET, EXTENDED RELEASE ORAL PRN
Status: DISCONTINUED | OUTPATIENT
Start: 2023-01-01 | End: 2023-01-01 | Stop reason: HOSPADM

## 2023-01-01 RX ORDER — ONDANSETRON 4 MG/1
4 TABLET, ORALLY DISINTEGRATING ORAL EVERY 8 HOURS PRN
Status: DISCONTINUED | OUTPATIENT
Start: 2023-01-01 | End: 2023-01-01 | Stop reason: HOSPADM

## 2023-01-01 RX ORDER — MESALAMINE 400 MG/1
800 CAPSULE, DELAYED RELEASE ORAL DAILY
Status: DISCONTINUED | OUTPATIENT
Start: 2023-01-01 | End: 2023-01-01 | Stop reason: HOSPADM

## 2023-01-01 RX ORDER — 0.9 % SODIUM CHLORIDE 0.9 %
500 INTRAVENOUS SOLUTION INTRAVENOUS ONCE
Status: COMPLETED | OUTPATIENT
Start: 2023-01-01 | End: 2023-01-01

## 2023-01-01 RX ORDER — ONDANSETRON 2 MG/ML
4 INJECTION INTRAMUSCULAR; INTRAVENOUS EVERY 6 HOURS PRN
Status: DISCONTINUED | OUTPATIENT
Start: 2023-01-01 | End: 2023-01-01 | Stop reason: HOSPADM

## 2023-01-01 RX ORDER — SODIUM CHLORIDE 9 MG/ML
INJECTION, SOLUTION INTRAVENOUS PRN
Status: DISCONTINUED | OUTPATIENT
Start: 2023-01-01 | End: 2023-01-01 | Stop reason: HOSPADM

## 2023-01-01 RX ORDER — MORPHINE SULFATE 2 MG/ML
1 INJECTION, SOLUTION INTRAMUSCULAR; INTRAVENOUS EVERY 4 HOURS PRN
Status: DISCONTINUED | OUTPATIENT
Start: 2023-01-01 | End: 2023-01-01 | Stop reason: HOSPADM

## 2023-01-01 RX ORDER — BACLOFEN 10 MG/1
TABLET ORAL
Qty: 270 TABLET | Refills: 1 | OUTPATIENT
Start: 2023-01-01

## 2023-01-01 RX ORDER — POTASSIUM CHLORIDE 7.45 MG/ML
10 INJECTION INTRAVENOUS PRN
Status: DISCONTINUED | OUTPATIENT
Start: 2023-01-01 | End: 2023-01-01 | Stop reason: HOSPADM

## 2023-01-01 RX ORDER — SODIUM CHLORIDE 9 MG/ML
INJECTION, SOLUTION INTRAVENOUS CONTINUOUS
Status: DISCONTINUED | OUTPATIENT
Start: 2023-01-01 | End: 2023-01-01 | Stop reason: HOSPADM

## 2023-01-01 RX ORDER — SODIUM CHLORIDE 0.9 % (FLUSH) 0.9 %
5-40 SYRINGE (ML) INJECTION EVERY 12 HOURS SCHEDULED
Status: DISCONTINUED | OUTPATIENT
Start: 2023-01-01 | End: 2023-01-01 | Stop reason: HOSPADM

## 2023-01-01 RX ORDER — CALCIUM POLYCARBOPHIL 625 MG 625 MG/1
1250 TABLET ORAL DAILY
Status: DISCONTINUED | OUTPATIENT
Start: 2023-01-01 | End: 2023-01-01 | Stop reason: HOSPADM

## 2023-01-01 RX ORDER — ACETAMINOPHEN 650 MG/1
650 SUPPOSITORY RECTAL EVERY 6 HOURS PRN
Status: DISCONTINUED | OUTPATIENT
Start: 2023-01-01 | End: 2023-01-01 | Stop reason: HOSPADM

## 2023-01-01 RX ORDER — GEMFIBROZIL 600 MG/1
600 TABLET, FILM COATED ORAL
Status: DISCONTINUED | OUTPATIENT
Start: 2023-01-01 | End: 2023-01-01 | Stop reason: HOSPADM

## 2023-01-01 RX ORDER — MULTIVITAMIN WITH IRON
1 TABLET ORAL DAILY
Status: DISCONTINUED | OUTPATIENT
Start: 2023-01-01 | End: 2023-01-01 | Stop reason: HOSPADM

## 2023-01-01 RX ORDER — LOPERAMIDE HYDROCHLORIDE 2 MG/1
2 CAPSULE ORAL PRN
Status: DISCONTINUED | OUTPATIENT
Start: 2023-01-01 | End: 2023-01-01 | Stop reason: HOSPADM

## 2023-01-01 RX ORDER — 0.9 % SODIUM CHLORIDE 0.9 %
1000 INTRAVENOUS SOLUTION INTRAVENOUS ONCE
Status: COMPLETED | OUTPATIENT
Start: 2023-01-01 | End: 2023-01-01

## 2023-01-01 RX ORDER — ACETAMINOPHEN 325 MG/1
650 TABLET ORAL EVERY 6 HOURS PRN
Status: DISCONTINUED | OUTPATIENT
Start: 2023-01-01 | End: 2023-01-01 | Stop reason: HOSPADM

## 2023-01-01 RX ORDER — HYDROCHLOROTHIAZIDE 25 MG/1
TABLET ORAL
Qty: 90 TABLET | Refills: 1 | OUTPATIENT
Start: 2023-01-01

## 2023-01-01 RX ORDER — FINASTERIDE 5 MG/1
5 TABLET, FILM COATED ORAL DAILY
Status: DISCONTINUED | OUTPATIENT
Start: 2023-01-01 | End: 2023-01-01 | Stop reason: HOSPADM

## 2023-01-01 RX ORDER — SODIUM CHLORIDE 0.9 % (FLUSH) 0.9 %
5-40 SYRINGE (ML) INJECTION PRN
Status: DISCONTINUED | OUTPATIENT
Start: 2023-01-01 | End: 2023-01-01 | Stop reason: HOSPADM

## 2023-01-01 RX ORDER — CARVEDILOL 12.5 MG/1
12.5 TABLET ORAL 2 TIMES DAILY WITH MEALS
Qty: 60 TABLET | Refills: 1 | Status: SHIPPED | OUTPATIENT
Start: 2023-01-01

## 2023-01-01 RX ORDER — MAGNESIUM SULFATE IN WATER 40 MG/ML
2000 INJECTION, SOLUTION INTRAVENOUS PRN
Status: DISCONTINUED | OUTPATIENT
Start: 2023-01-01 | End: 2023-01-01 | Stop reason: HOSPADM

## 2023-01-01 RX ORDER — LACTOBACILLUS RHAMNOSUS GG 10B CELL
1 CAPSULE ORAL DAILY
Status: DISCONTINUED | OUTPATIENT
Start: 2023-01-01 | End: 2023-01-01 | Stop reason: HOSPADM

## 2023-01-01 RX ORDER — POLYETHYLENE GLYCOL 3350 17 G/17G
17 POWDER, FOR SOLUTION ORAL DAILY PRN
Status: DISCONTINUED | OUTPATIENT
Start: 2023-01-01 | End: 2023-01-01 | Stop reason: HOSPADM

## 2023-01-01 RX ORDER — CARVEDILOL 25 MG/1
25 TABLET ORAL 2 TIMES DAILY WITH MEALS
Status: DISCONTINUED | OUTPATIENT
Start: 2023-01-01 | End: 2023-01-01 | Stop reason: HOSPADM

## 2023-01-01 RX ADMIN — Medication 1 TABLET: at 07:51

## 2023-01-01 RX ADMIN — CARVEDILOL 25 MG: 25 TABLET, FILM COATED ORAL at 13:16

## 2023-01-01 RX ADMIN — SODIUM CHLORIDE 1000 ML: 9 INJECTION, SOLUTION INTRAVENOUS at 18:03

## 2023-01-01 RX ADMIN — FINASTERIDE 5 MG: 5 TABLET, FILM COATED ORAL at 13:16

## 2023-01-01 RX ADMIN — ACETAMINOPHEN 650 MG: 325 TABLET ORAL at 07:52

## 2023-01-01 RX ADMIN — APIXABAN 5 MG: 5 TABLET, FILM COATED ORAL at 13:16

## 2023-01-01 RX ADMIN — IOPAMIDOL 85 ML: 755 INJECTION, SOLUTION INTRAVENOUS at 14:10

## 2023-01-01 RX ADMIN — ACETAMINOPHEN 650 MG: 650 SUPPOSITORY RECTAL at 23:32

## 2023-01-01 RX ADMIN — CALCIUM POLYCARBOPHIL 1250 MG: 625 TABLET, FILM COATED ORAL at 08:21

## 2023-01-01 RX ADMIN — CARVEDILOL 25 MG: 25 TABLET, FILM COATED ORAL at 16:08

## 2023-01-01 RX ADMIN — Medication 1 TABLET: at 08:21

## 2023-01-01 RX ADMIN — APIXABAN 5 MG: 5 TABLET, FILM COATED ORAL at 07:52

## 2023-01-01 RX ADMIN — PANTOPRAZOLE SODIUM 40 MG: 40 TABLET, DELAYED RELEASE ORAL at 07:52

## 2023-01-01 RX ADMIN — FINASTERIDE 5 MG: 5 TABLET, FILM COATED ORAL at 07:52

## 2023-01-01 RX ADMIN — CALCIUM POLYCARBOPHIL 1250 MG: 625 TABLET, FILM COATED ORAL at 07:50

## 2023-01-01 RX ADMIN — MESALAMINE 800 MG: 400 CAPSULE, DELAYED RELEASE ORAL at 13:16

## 2023-01-01 RX ADMIN — SODIUM CHLORIDE, PRESERVATIVE FREE 10 ML: 5 INJECTION INTRAVENOUS at 19:36

## 2023-01-01 RX ADMIN — SODIUM CHLORIDE 500 ML: 9 INJECTION, SOLUTION INTRAVENOUS at 19:38

## 2023-01-01 RX ADMIN — LORAZEPAM 0.5 MG: 2 INJECTION INTRAMUSCULAR; INTRAVENOUS at 00:33

## 2023-01-01 RX ADMIN — ROSUVASTATIN 5 MG: 10 TABLET, FILM COATED ORAL at 20:26

## 2023-01-01 RX ADMIN — APIXABAN 5 MG: 5 TABLET, FILM COATED ORAL at 08:20

## 2023-01-01 RX ADMIN — VANCOMYCIN HYDROCHLORIDE 1000 MG: 1 INJECTION, POWDER, LYOPHILIZED, FOR SOLUTION INTRAVENOUS at 19:39

## 2023-01-01 RX ADMIN — PIPERACILLIN AND TAZOBACTAM 4500 MG: 4; .5 INJECTION, POWDER, LYOPHILIZED, FOR SOLUTION INTRAVENOUS at 18:01

## 2023-01-01 RX ADMIN — FINASTERIDE 5 MG: 5 TABLET, FILM COATED ORAL at 08:22

## 2023-01-01 RX ADMIN — SODIUM CHLORIDE: 9 INJECTION, SOLUTION INTRAVENOUS at 20:16

## 2023-01-01 RX ADMIN — Medication 1 CAPSULE: at 08:21

## 2023-01-01 RX ADMIN — APIXABAN 5 MG: 5 TABLET, FILM COATED ORAL at 20:26

## 2023-01-01 RX ADMIN — PANTOPRAZOLE SODIUM 40 MG: 40 TABLET, DELAYED RELEASE ORAL at 13:16

## 2023-01-01 RX ADMIN — SODIUM CHLORIDE 1000 ML: 9 INJECTION, SOLUTION INTRAVENOUS at 15:16

## 2023-01-01 RX ADMIN — Medication 1 CAPSULE: at 07:50

## 2023-01-01 RX ADMIN — ACETAMINOPHEN 650 MG: 325 TABLET ORAL at 16:05

## 2023-01-01 RX ADMIN — PANTOPRAZOLE SODIUM 40 MG: 40 TABLET, DELAYED RELEASE ORAL at 08:21

## 2023-01-01 RX ADMIN — LORAZEPAM 0.5 MG: 2 INJECTION INTRAMUSCULAR; INTRAVENOUS at 19:36

## 2023-01-01 RX ADMIN — MESALAMINE 800 MG: 400 CAPSULE, DELAYED RELEASE ORAL at 08:21

## 2023-01-01 RX ADMIN — ACETAMINOPHEN 650 MG: 325 TABLET ORAL at 23:02

## 2023-01-01 RX ADMIN — MESALAMINE 800 MG: 400 CAPSULE, DELAYED RELEASE ORAL at 07:51

## 2023-01-01 RX ADMIN — GEMFIBROZIL 600 MG: 600 TABLET ORAL at 07:51

## 2023-01-01 RX ADMIN — SODIUM CHLORIDE: 9 INJECTION, SOLUTION INTRAVENOUS at 16:05

## 2023-01-01 RX ADMIN — SODIUM CHLORIDE, PRESERVATIVE FREE 10 ML: 5 INJECTION INTRAVENOUS at 08:40

## 2023-01-01 RX ADMIN — GEMFIBROZIL 600 MG: 600 TABLET ORAL at 16:06

## 2023-01-01 ASSESSMENT — PAIN - FUNCTIONAL ASSESSMENT
PAIN_FUNCTIONAL_ASSESSMENT: NONE - DENIES PAIN
PAIN_FUNCTIONAL_ASSESSMENT: NONE - DENIES PAIN
PAIN_FUNCTIONAL_ASSESSMENT: PREVENTS OR INTERFERES SOME ACTIVE ACTIVITIES AND ADLS
PAIN_FUNCTIONAL_ASSESSMENT: NONE - DENIES PAIN

## 2023-01-01 ASSESSMENT — PAIN DESCRIPTION - LOCATION
LOCATION: BACK
LOCATION: GENERALIZED

## 2023-01-01 ASSESSMENT — PAIN SCALES - GENERAL
PAINLEVEL_OUTOF10: 0
PAINLEVEL_OUTOF10: 0
PAINLEVEL_OUTOF10: 4
PAINLEVEL_OUTOF10: 2
PAINLEVEL_OUTOF10: 0

## 2023-01-01 ASSESSMENT — PAIN SCALES - WONG BAKER: WONGBAKER_NUMERICALRESPONSE: 0

## 2023-01-01 ASSESSMENT — PAIN DESCRIPTION - DESCRIPTORS: DESCRIPTORS: ACHING

## 2023-01-01 ASSESSMENT — PAIN DESCRIPTION - ORIENTATION: ORIENTATION: LOWER;MID

## 2023-01-03 ENCOUNTER — OFFICE VISIT (OUTPATIENT)
Dept: FAMILY MEDICINE CLINIC | Age: 83
End: 2023-01-03
Payer: MEDICARE

## 2023-01-03 VITALS
WEIGHT: 164 LBS | SYSTOLIC BLOOD PRESSURE: 126 MMHG | BODY MASS INDEX: 21.64 KG/M2 | HEART RATE: 76 BPM | RESPIRATION RATE: 20 BRPM | DIASTOLIC BLOOD PRESSURE: 78 MMHG

## 2023-01-03 DIAGNOSIS — R22.43 LOCALIZED SWELLING OF BOTH LOWER LEGS: ICD-10-CM

## 2023-01-03 DIAGNOSIS — R63.4 WEIGHT LOSS: Primary | ICD-10-CM

## 2023-01-03 PROCEDURE — 1123F ACP DISCUSS/DSCN MKR DOCD: CPT | Performed by: NURSE PRACTITIONER

## 2023-01-03 PROCEDURE — 99214 OFFICE O/P EST MOD 30 MIN: CPT | Performed by: NURSE PRACTITIONER

## 2023-01-03 PROCEDURE — 3078F DIAST BP <80 MM HG: CPT | Performed by: NURSE PRACTITIONER

## 2023-01-03 PROCEDURE — 3074F SYST BP LT 130 MM HG: CPT | Performed by: NURSE PRACTITIONER

## 2023-01-03 RX ORDER — HYDROCHLOROTHIAZIDE 25 MG/1
25 TABLET ORAL DAILY
COMMUNITY

## 2023-01-03 ASSESSMENT — ENCOUNTER SYMPTOMS
SHORTNESS OF BREATH: 0
ROS SKIN COMMENTS: PRURITIS

## 2023-01-03 NOTE — PROGRESS NOTES
Yaron Chew (:  1940) is a 79 y.o. male,Established patient, here for evaluation of the following chief complaint(s):  Follow-up (Weight loss, low BP) and Rash (was started on bumex for edema for bilateral ankles and now has itchy rash /)         ASSESSMENT/PLAN:  1. Weight loss  - Improving  - 6 lb weight gain since last visit without evidence of fluid overload  - Continue increased daily caloric intake, including addition of meal-supplementing beverages if needed    2. Localized swelling of both lower legs  - Patient elected to stop bumex and potassium supplement yesterday d/t lack of improvement in symptoms and medication SE  - Encouraged patient to restart hctz if no longer using bumex- has previously successfully been treated with hctz but medication was stopped following most recent visit d/t dual  diuretic use  - Encouraged use of compression stockings x12 hours daily for management of BLE edema  - DASH diet, regular exercise encouraged  - Encouraged patient to notify cardiology of his medication changes (patient has stopped isosorbide, amlodipine and bumex) as these were all recently started by Dr. Duc Hummel    Return if symptoms worsen or fail to improve. Subjective   SUBJECTIVE/OBJECTIVE:  Presents with his wife for a 1 week follow for weight loss, hypotension. HCTZ was stopped d/t low reading. Reports 20 lb weight loss over the last 1.5 months which he attributes to amlodipine and bumex use. Over the last week, he since regained 6 lbs. Has been increasing his daily caloric intake. Drinking protein drinks. Stopped taking bumex yesterday d/t SE and lack of improvement in leg swelling. Has not yet notified cardiology. Presents with blood pressure journal. No hypertensive or hypotensive readings noted. Denies lightheadedness and dizziness. No significant change in bilateral leg swelling. R remains worse than left. Worsens throughout the day. Drinking approximately 32 oz of water daily. Review of Systems   Constitutional:  Negative for appetite change and unexpected weight change. Respiratory:  Negative for shortness of breath. Cardiovascular:  Positive for leg swelling. Skin:         Pruritis   Neurological:  Negative for dizziness and light-headedness. Psychiatric/Behavioral:  Negative for confusion. Objective   Physical Exam  Vitals and nursing note reviewed. Constitutional:       General: He is awake. Appearance: Normal appearance. HENT:      Head: Normocephalic and atraumatic. Right Ear: Hearing and external ear normal.      Left Ear: Hearing and external ear normal.      Nose: Nose normal. No congestion or rhinorrhea. Eyes:      General: Lids are normal.         Right eye: No discharge. Left eye: No discharge. Conjunctiva/sclera: Conjunctivae normal.   Neck:      Trachea: No tracheal deviation. Cardiovascular:      Rate and Rhythm: Regular rhythm. Heart sounds: Normal heart sounds. No murmur heard. Pulmonary:      Effort: Pulmonary effort is normal. No respiratory distress. Breath sounds: No stridor. No wheezing. Musculoskeletal:      Cervical back: Full passive range of motion without pain. Right lower le+ Pitting Edema present. Left lower leg: No edema. Skin:     General: Skin is dry. Coloration: Skin is not jaundiced or pale. Neurological:      General: No focal deficit present. Mental Status: He is alert. Mental status is at baseline. Psychiatric:         Mood and Affect: Mood and affect normal.         Behavior: Behavior is cooperative. On this date 1/3/2023 I have spent 30 minutes reviewing previous notes, test results and face to face with the patient discussing the diagnosis and importance of compliance with the treatment plan as well as documenting on the day of the visit. An electronic signature was used to authenticate this note.     --Ross Farah, SID - CNP

## 2023-01-06 ENCOUNTER — TELEPHONE (OUTPATIENT)
Dept: FAMILY MEDICINE CLINIC | Age: 83
End: 2023-01-06

## 2023-01-06 DIAGNOSIS — L29.9 GENERALIZED PRURITUS: Primary | ICD-10-CM

## 2023-01-06 RX ORDER — HYDROXYZINE HYDROCHLORIDE 10 MG/1
10 TABLET, FILM COATED ORAL EVERY 6 HOURS PRN
Qty: 30 TABLET | Refills: 0 | Status: SHIPPED | OUTPATIENT
Start: 2023-01-06 | End: 2023-01-16

## 2023-01-06 NOTE — TELEPHONE ENCOUNTER
Have him use moisturizing lotion at least once daily to areas of pruritis. Have him try hydroxyzine nightly. May increase to 3-4x daily if needed. Monitor for SE including drowsiness. Do not use benadryl and hydroxyzine simultaneously.

## 2023-01-06 NOTE — TELEPHONE ENCOUNTER
Patients wife stopped by the office and stated that patient has developed some more spots that are itching on his arms. Wife states that they are going to try Benadryl to see if this gives him any relief. Is there anything else that you would recommend? Please advice.

## 2023-01-16 ENCOUNTER — TELEPHONE (OUTPATIENT)
Dept: FAMILY MEDICINE CLINIC | Age: 83
End: 2023-01-16

## 2023-01-16 NOTE — TELEPHONE ENCOUNTER
[facility]- nurse triage, Patient reports would like an appointment scheduled with Dr. Luis A Farley as he continues to have bilateral feet swelling. Appointment scheduled for 1/20/23 at 1015.

## 2023-01-18 ENCOUNTER — OFFICE VISIT (OUTPATIENT)
Dept: FAMILY MEDICINE CLINIC | Age: 83
End: 2023-01-18
Payer: MEDICARE

## 2023-01-18 VITALS
BODY MASS INDEX: 21.79 KG/M2 | SYSTOLIC BLOOD PRESSURE: 130 MMHG | HEART RATE: 63 BPM | HEIGHT: 73 IN | TEMPERATURE: 98.1 F | OXYGEN SATURATION: 98 % | WEIGHT: 164.4 LBS | DIASTOLIC BLOOD PRESSURE: 72 MMHG | RESPIRATION RATE: 14 BRPM

## 2023-01-18 DIAGNOSIS — L05.91 PILONIDAL CYST WITHOUT ABSCESS: Primary | ICD-10-CM

## 2023-01-18 DIAGNOSIS — R22.43 LOCALIZED SWELLING OF BOTH LOWER LEGS: ICD-10-CM

## 2023-01-18 PROCEDURE — 1123F ACP DISCUSS/DSCN MKR DOCD: CPT | Performed by: FAMILY MEDICINE

## 2023-01-18 PROCEDURE — 99213 OFFICE O/P EST LOW 20 MIN: CPT | Performed by: FAMILY MEDICINE

## 2023-01-18 PROCEDURE — 3075F SYST BP GE 130 - 139MM HG: CPT | Performed by: FAMILY MEDICINE

## 2023-01-18 PROCEDURE — 3078F DIAST BP <80 MM HG: CPT | Performed by: FAMILY MEDICINE

## 2023-01-18 RX ORDER — CEPHALEXIN 500 MG/1
500 CAPSULE ORAL 3 TIMES DAILY
Qty: 30 CAPSULE | Refills: 0 | Status: SHIPPED | OUTPATIENT
Start: 2023-01-18 | End: 2023-01-28

## 2023-01-18 ASSESSMENT — PATIENT HEALTH QUESTIONNAIRE - PHQ9
2. FEELING DOWN, DEPRESSED OR HOPELESS: 0
SUM OF ALL RESPONSES TO PHQ9 QUESTIONS 1 & 2: 0
SUM OF ALL RESPONSES TO PHQ QUESTIONS 1-9: 0
1. LITTLE INTEREST OR PLEASURE IN DOING THINGS: 0

## 2023-01-18 NOTE — PROGRESS NOTES
[facility]  [address]  [facility] [address]  Dept: [phone]  Dept Fax: [phone]  Loc: [phone]  PROGRESS NOTE      Visit Date: 1/18/2023    Gaston Martinez is a 82 y.o. male who presents today for:  Chief Complaint   Patient presents with    Other     Busted last night.        Subjective:  Other  Pertinent negatives include no chills or fever.     Left buttock with draining cyst/abscess.  Started 1.5 weeks ago. Bloody clear drainage started yesterday.  Painful.    Bilateral feet swelling. Uses compression stockings.  On hctz. Tried bumex which did not help.    Review of Systems   Constitutional:  Negative for chills and fever.   Skin:  Positive for wound.     Past Medical History:   Diagnosis Date    Arthritis     Atrial fibrillation (HCC)     Atrial fibrillation (HCC) 07/08/2020    CAD (coronary artery disease)     CHF (congestive heart failure) (HCC)     Dyslipidemia 06/23/2021    Enlarged prostate 06/23/2021    GERD (gastroesophageal reflux disease)     Hyperlipidemia     Hypertension     Medtronic dual pacemaker  11/8/2022      Current Outpatient Medications   Medication Sig Dispense Refill    hydroCHLOROthiazide (HYDRODIURIL) 25 MG tablet Take 25 mg by mouth daily      terazosin (HYTRIN) 5 MG capsule Take 5 mg by mouth nightly      carvedilol (COREG) 25 MG tablet Take 1 tablet by mouth 2 times daily (with meals) 180 tablet 3    captopril (CAPOTEN) 25 MG tablet Take 0.5 tablets by mouth 2 times daily 90 tablet 3    ELIQUIS 5 MG TABS tablet Take 1 tablet by mouth 2 times daily 180 tablet 3    baclofen (LIORESAL) 10 MG tablet Take 1 tablet by mouth 3 times daily as needed (muscle spasm) 270 tablet 1    finasteride (PROSCAR) 5 MG tablet Take 1 tablet by mouth daily 90 tablet 3    famotidine (PEPCID) 40 MG tablet Take 40 mg by mouth 2 times daily      mesalamine (DELZICOL) 800 MG TBEC TBEC tablet Take 800 mg by mouth daily        digoxin (LANOXIN) 125 MCG tablet Take 1 tablet by mouth daily. 90 tablet 1    gemfibrozil (LOPID) 600 MG tablet Take 1 tablet by mouth 2 times daily (before meals). 180 tablet 1    Coenzyme Q10 (COQ10) 200 MG CAPS Take 200 mg/day by mouth 2 times daily  180 capsule 1    budesonide (ENTOCORT EC) 3 MG extended release capsule take 3 capsules by mouth every morning (Patient not taking: Reported on 1/18/2023)      cetirizine (ZYRTEC) 10 MG tablet Take 1 tablet by mouth daily (Patient not taking: Reported on 1/18/2023) 30 tablet 0    rosuvastatin (CRESTOR) 5 MG tablet Take 1 tablet by mouth daily (Patient not taking: Reported on 1/18/2023) 90 tablet 1     No current facility-administered medications for this visit. Allergies   Allergen Reactions    Latex Rash    Gluten Meal     Vasotec [Enalaprilat] Other (See Comments)     cough    Neosporin [Neomycin-Polymyxin-Gramicidin] Rash    Sulfa Antibiotics Rash    Voltaren [Diclofenac Sodium] Rash     gel       Objective:     /72   Pulse 63   Temp 98.1 °F (36.7 °C)   Resp 14   Ht 6' 1\" (1.854 m)   Wt 164 lb 6.4 oz (74.6 kg)   SpO2 98%   BMI 21.69 kg/m²   Physical Exam  Vitals reviewed. Constitutional:       General: He is not in acute distress. Appearance: He is not ill-appearing. Pulmonary:      Effort: Pulmonary effort is normal. No respiratory distress. Musculoskeletal:      Right lower leg: Edema (2+) present. Left lower leg: Edema (2+) present. Neurological:      Mental Status: He is alert. Sacral area without induration. Mild erythema mostly on the medial left buttock. Scabbed lesion present without drainage on left medial buttock. No fluctuance. Dried blood present    Impression/Plan:  1. Pilonidal cyst without abscess  Possible pilonidal cyst which has ruptured. No evidence of abscess. Will treat empirically with Keflex to try to limit or prevent infection. Return if it worsens  - cephALEXin (KEFLEX) 500 MG capsule;  Take 1 capsule by mouth 3 times daily for 10 days  Dispense: 30 capsule; Refill: 0    2. Localized swelling of both lower legs  Bilateral lower leg/feet swelling likely due to venous insufficiency and to CHF. Continue hydrochlorothiazide and compression stockings. Discussed possible lymphedema clinic referral which he declines. They voiced understanding. All questions answered. They agreed with treatment plan. See patient instructions for any educational materials that may have been given. Discussed use, benefit, and side effects of prescribed medications. (Please note that portions of this note may have been completed with a voice recognition program.  Efforts were made to edit the dictation but occasionally words are mis-transcribed.)    Return if symptoms worsen or fail to improve, for cancel appt for 1/20.   Next  Appt 2/23       Electronically signed by Clotilde Saez MD on 1/18/2023 at 8:47 AM

## 2023-02-16 ENCOUNTER — OFFICE VISIT (OUTPATIENT)
Dept: CARDIOLOGY CLINIC | Age: 83
End: 2023-02-16
Payer: MEDICARE

## 2023-02-16 VITALS
BODY MASS INDEX: 21.2 KG/M2 | DIASTOLIC BLOOD PRESSURE: 75 MMHG | SYSTOLIC BLOOD PRESSURE: 136 MMHG | HEIGHT: 73 IN | HEART RATE: 69 BPM | WEIGHT: 160 LBS

## 2023-02-16 DIAGNOSIS — I48.21 PERMANENT ATRIAL FIBRILLATION (HCC): ICD-10-CM

## 2023-02-16 DIAGNOSIS — I25.10 ASHD (ARTERIOSCLEROTIC HEART DISEASE): Primary | ICD-10-CM

## 2023-02-16 PROCEDURE — 3078F DIAST BP <80 MM HG: CPT | Performed by: INTERNAL MEDICINE

## 2023-02-16 PROCEDURE — 3075F SYST BP GE 130 - 139MM HG: CPT | Performed by: INTERNAL MEDICINE

## 2023-02-16 PROCEDURE — 99214 OFFICE O/P EST MOD 30 MIN: CPT | Performed by: INTERNAL MEDICINE

## 2023-02-16 PROCEDURE — 1123F ACP DISCUSS/DSCN MKR DOCD: CPT | Performed by: INTERNAL MEDICINE

## 2023-02-16 RX ORDER — PANTOPRAZOLE SODIUM 40 MG/1
TABLET, DELAYED RELEASE ORAL
COMMUNITY
Start: 2023-02-03

## 2023-02-16 NOTE — PROGRESS NOTES
[id number] [facility] [address]  [place] [address]  Dept: [phone]  Dept Fax: [phone]  Loc: [phone]    Visit Date: 2/16/2023    Mr. Zoila Mccloud is a 79 y.o. male  who presented for:  Chief Complaint   Patient presents with    New Patient       HPI:   HPI   78 yo M hx of Afib, CAD, HTN, HLD who presents for establishment of care. No chest pain, angina, ZIMMERMAN, orthopnea, PND, sob at rest, palpitations, LE edema, or syncope. Patient with prior cath 3/2022 showing mid LCX 60%, and ostial 80% LAD stenosis--was told to continue medical therapy. EF preserved, moderate AS. He took Norvasc, but had LE edema. Eliquis without bleeding. Patient is in Afib with complete HB and has V-pacing, per interrogation he is pacing 73% of the time, with VS 26%. No bleeding. No syncope. Can do all ADLs. Cr 0.5.          Current Outpatient Medications:     pantoprazole (PROTONIX) 40 MG tablet, TAKE 1 TABLET BY MOUTH ONCE DAILY, Disp: , Rfl:     hydroCHLOROthiazide (HYDRODIURIL) 25 MG tablet, Take 25 mg by mouth daily, Disp: , Rfl:     budesonide (ENTOCORT EC) 3 MG extended release capsule, , Disp: , Rfl:     terazosin (HYTRIN) 5 MG capsule, Take 5 mg by mouth nightly, Disp: , Rfl:     carvedilol (COREG) 25 MG tablet, Take 1 tablet by mouth 2 times daily (with meals), Disp: 180 tablet, Rfl: 3    captopril (CAPOTEN) 25 MG tablet, Take 0.5 tablets by mouth 2 times daily, Disp: 90 tablet, Rfl: 3    ELIQUIS 5 MG TABS tablet, Take 1 tablet by mouth 2 times daily, Disp: 180 tablet, Rfl: 3    baclofen (LIORESAL) 10 MG tablet, Take 1 tablet by mouth 3 times daily as needed (muscle spasm), Disp: 270 tablet, Rfl: 1    rosuvastatin (CRESTOR) 5 MG tablet, Take 1 tablet by mouth daily, Disp: 90 tablet, Rfl: 1    finasteride (PROSCAR) 5 MG tablet, Take 1 tablet by mouth daily, Disp: 90 tablet, Rfl: 3    mesalamine (DELZICOL) 800 MG TBEC TBEC tablet, Take 800 mg by mouth daily , Disp: , Rfl:     digoxin (LANOXIN) 125 MCG tablet, Take 1 tablet by mouth daily. , Disp: 90 tablet, Rfl: 1    gemfibrozil (LOPID) 600 MG tablet, Take 1 tablet by mouth 2 times daily (before meals). , Disp: 180 tablet, Rfl: 1    Coenzyme Q10 (COQ10) 200 MG CAPS, Take 200 mg/day by mouth 2 times daily , Disp: 180 capsule, Rfl: 1    Past Medical History  Bandar Case  has a past medical history of Arthritis, Atrial fibrillation ([facility] [address] Atrial fibrillation ([facility] [address] CAD (coronary artery disease), CHF (congestive heart failure) ([facility] [address] Dyslipidemia, Enlarged prostate, GERD (gastroesophageal reflux disease), Hyperlipidemia, Hypertension, and Medtronic dual pacemaker . Social History  Bandar Case  reports that he quit smoking about 58 years ago. His smoking use included cigarettes. He has a 7.50 pack-year smoking history. He has never used smokeless tobacco. He reports that he does not drink alcohol and does not use drugs. Family History  Bandar Case family history includes Cancer in his father; Kidney Disease in his mother. There is no family history of bicuspid aortic valve, aneurysms, heart transplant, pacemakers, defibrillators, or sudden cardiac death. Past Surgical History   Past Surgical History:   Procedure Laterality Date    BUNIONECTOMY Left 1990's    Amy Goss 2015    Dr. Lynn Kilpatrick    COLONOSCOPY      x2 Dr. Caty Mobley  2016    Dr. Sophia Rivero Right 1960's    inguinal    Girtha Lindion  2008    Dr. Gerardo Dillon  2016    Dr. Gordon Duty       Review of Systems   Constitutional: Negative for chills and fever  HENT: Negative for congestion, sinus pressure, sneezing and sore throat. Eyes: Negative for pain, discharge, redness and itching.    Respiratory: Negative for apnea, cough  Gastrointestinal: Negative for blood in stool, constipation, diarrhea   Endocrine: Negative for cold intolerance, heat intolerance, polydipsia. Genitourinary: Negative for dysuria, enuresis, flank pain and hematuria. Musculoskeletal: Negative for arthralgias, joint swelling and neck pain. Neurological: Negative for numbness and headaches. Psychiatric/Behavioral: Negative for agitation, confusion, decreased concentration and dysphoric mood. Objective:     /75   Pulse 69   Ht 6' 1\" (1.854 m)   Wt 160 lb (72.6 kg)   BMI 21.11 kg/m²     Wt Readings from Last 3 Encounters:   02/16/23 160 lb (72.6 kg)   01/18/23 164 lb 6.4 oz (74.6 kg)   01/03/23 164 lb (74.4 kg)     BP Readings from Last 3 Encounters:   02/16/23 136/75   01/18/23 130/72   01/03/23 126/78       Nursing note and vitals reviewed. Physical Exam   Constitutional: Oriented to person, place, and time. Appears well-developed and well-nourished. HENT:   Head: Normocephalic and atraumatic. Eyes: EOM are normal. Pupils are equal, round, and reactive to light. Neck: Normal range of motion. Neck supple. No JVD present. Cardiovascular: Normal rate, regular rhythm, normal heart sounds and intact distal pulses. No murmur heard. Pulmonary/Chest: Effort normal and breath sounds normal. No respiratory distress. No wheezes. No rales. Abdominal: Soft. Bowel sounds are normal. No distension. There is no tenderness. Musculoskeletal: Normal range of motion. No edema. Neurological: Alert and oriented to person, place, and time. No cranial nerve deficit. Coordination normal.   Skin: Skin is warm and dry. Psychiatric: Normal mood and affect.        No results found for: CKTOTAL, CKMB, CKMBINDEX    Lab Results   Component Value Date/Time    WBC 14.2 12/27/2022 10:01 AM    RBC 4.24 12/27/2022 10:01 AM    HGB 12.9 12/27/2022 10:01 AM    HCT 39.6 12/27/2022 10:01 AM    MCV 93.4 12/27/2022 10:01 AM    MCH 30.4 12/27/2022 10:01 AM    MCHC 32.6 12/27/2022 10:01 AM    RDW 14.3 04/17/2018 05:08 AM     12/27/2022 10:01 AM    MPV 10.5 12/27/2022 10:01 AM       Lab Results   Component Value Date/Time     12/27/2022 10:01 AM    K 4.1 12/27/2022 10:01 AM    K 4.5 03/09/2022 05:56 AM    CL 96 12/27/2022 10:01 AM    CO2 28 12/27/2022 10:01 AM    BUN 23 12/27/2022 10:01 AM    LABALBU 4.9 12/27/2022 10:01 AM    LABALBU 4.7 05/11/2012 07:14 AM    CREATININE 0.5 12/27/2022 10:01 AM    CALCIUM 11.7 12/27/2022 10:01 AM    LABGLOM >60 12/27/2022 10:01 AM    GLUCOSE 128 12/27/2022 10:01 AM    GLUCOSE 104 05/11/2012 07:14 AM       Lab Results   Component Value Date/Time    ALKPHOS 93 09/15/2022 07:26 AM    ALT <5 09/15/2022 07:26 AM    AST 23 09/15/2022 07:26 AM    PROT 7.8 09/15/2022 07:26 AM    PROT 7.5 09/14/2016 07:42 AM    BILITOT 0.8 09/15/2022 07:26 AM    BILIDIR <0.2 09/15/2022 07:26 AM    LABALBU 4.9 12/27/2022 10:01 AM    LABALBU 4.7 05/11/2012 07:14 AM       Lab Results   Component Value Date/Time    MG 2.1 04/17/2018 05:08 AM       Lab Results   Component Value Date    INR 1.16 (H) 03/09/2022    INR 1.10 04/17/2018         Lab Results   Component Value Date/Time    LABA1C 5.7 09/15/2022 07:25 AM       Lab Results   Component Value Date/Time    TRIG 86 09/15/2022 07:26 AM    HDL 49 09/15/2022 07:26 AM    LDLCALC 58 09/15/2022 07:26 AM       No results found for: TSH      Testing Reviewed:      I have individually reviewed the cardiac test below:    ECHO: No results found for this or any previous visit. 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 Continue Eliquis. Needs to daily ASA, but cannot due to GI issues. Continue statin, but will need to increase at some point due to CAD. RF management.   Discussed diet/exercise/BP/weight loss/health lifestyle choices/lipids; the patient understands the goals and will try to comply.       Disposition:  1 year           Electronically signed by Magan Goodson MD   2/16/2023 at 1:09 PM EST

## 2023-02-23 ENCOUNTER — OFFICE VISIT (OUTPATIENT)
Dept: FAMILY MEDICINE CLINIC | Age: 83
End: 2023-02-23
Payer: MEDICARE

## 2023-02-23 VITALS
HEART RATE: 60 BPM | BODY MASS INDEX: 20.83 KG/M2 | RESPIRATION RATE: 16 BRPM | WEIGHT: 157.2 LBS | OXYGEN SATURATION: 98 % | HEIGHT: 73 IN | DIASTOLIC BLOOD PRESSURE: 72 MMHG | SYSTOLIC BLOOD PRESSURE: 116 MMHG | TEMPERATURE: 97.3 F

## 2023-02-23 DIAGNOSIS — I10 ESSENTIAL HYPERTENSION: ICD-10-CM

## 2023-02-23 DIAGNOSIS — K29.30 CHRONIC SUPERFICIAL GASTRITIS WITHOUT BLEEDING: ICD-10-CM

## 2023-02-23 DIAGNOSIS — L05.91 PILONIDAL CYST WITHOUT ABSCESS: ICD-10-CM

## 2023-02-23 DIAGNOSIS — R53.81 PHYSICAL DECONDITIONING: ICD-10-CM

## 2023-02-23 DIAGNOSIS — Z00.00 MEDICARE ANNUAL WELLNESS VISIT, SUBSEQUENT: Primary | ICD-10-CM

## 2023-02-23 DIAGNOSIS — I25.10 ASHD (ARTERIOSCLEROTIC HEART DISEASE): ICD-10-CM

## 2023-02-23 DIAGNOSIS — R26.81 UNSTABLE GAIT: ICD-10-CM

## 2023-02-23 PROCEDURE — 3078F DIAST BP <80 MM HG: CPT | Performed by: FAMILY MEDICINE

## 2023-02-23 PROCEDURE — 3074F SYST BP LT 130 MM HG: CPT | Performed by: FAMILY MEDICINE

## 2023-02-23 PROCEDURE — 1123F ACP DISCUSS/DSCN MKR DOCD: CPT | Performed by: FAMILY MEDICINE

## 2023-02-23 PROCEDURE — G0439 PPPS, SUBSEQ VISIT: HCPCS | Performed by: FAMILY MEDICINE

## 2023-02-23 RX ORDER — CEPHALEXIN 500 MG/1
500 CAPSULE ORAL 3 TIMES DAILY
Qty: 30 CAPSULE | Refills: 0 | Status: SHIPPED | OUTPATIENT
Start: 2023-02-23 | End: 2023-03-05

## 2023-02-23 RX ORDER — ROSUVASTATIN CALCIUM 5 MG/1
5 TABLET, COATED ORAL DAILY
Qty: 90 TABLET | Refills: 3 | Status: SHIPPED | OUTPATIENT
Start: 2023-02-23

## 2023-02-23 RX ORDER — GEMFIBROZIL 600 MG/1
600 TABLET, FILM COATED ORAL
Qty: 180 TABLET | Refills: 3 | Status: SHIPPED | OUTPATIENT
Start: 2023-02-23

## 2023-02-23 RX ORDER — TERAZOSIN 5 MG/1
5 CAPSULE ORAL NIGHTLY
Qty: 90 CAPSULE | Refills: 3 | Status: SHIPPED | OUTPATIENT
Start: 2023-02-23

## 2023-02-23 RX ORDER — HYDROCHLOROTHIAZIDE 25 MG/1
25 TABLET ORAL DAILY
Qty: 90 TABLET | Refills: 3 | Status: SHIPPED | OUTPATIENT
Start: 2023-02-23

## 2023-02-23 SDOH — ECONOMIC STABILITY: HOUSING INSECURITY
IN THE LAST 12 MONTHS, WAS THERE A TIME WHEN YOU DID NOT HAVE A STEADY PLACE TO SLEEP OR SLEPT IN A SHELTER (INCLUDING NOW)?: NO

## 2023-02-23 SDOH — ECONOMIC STABILITY: INCOME INSECURITY: HOW HARD IS IT FOR YOU TO PAY FOR THE VERY BASICS LIKE FOOD, HOUSING, MEDICAL CARE, AND HEATING?: NOT HARD AT ALL

## 2023-02-23 SDOH — ECONOMIC STABILITY: FOOD INSECURITY: WITHIN THE PAST 12 MONTHS, THE FOOD YOU BOUGHT JUST DIDN'T LAST AND YOU DIDN'T HAVE MONEY TO GET MORE.: NEVER TRUE

## 2023-02-23 SDOH — ECONOMIC STABILITY: FOOD INSECURITY: WITHIN THE PAST 12 MONTHS, YOU WORRIED THAT YOUR FOOD WOULD RUN OUT BEFORE YOU GOT MONEY TO BUY MORE.: NEVER TRUE

## 2023-02-23 ASSESSMENT — PATIENT HEALTH QUESTIONNAIRE - PHQ9
SUM OF ALL RESPONSES TO PHQ QUESTIONS 1-9: 2
2. FEELING DOWN, DEPRESSED OR HOPELESS: 1
SUM OF ALL RESPONSES TO PHQ QUESTIONS 1-9: 2
SUM OF ALL RESPONSES TO PHQ9 QUESTIONS 1 & 2: 2
1. LITTLE INTEREST OR PLEASURE IN DOING THINGS: 1

## 2023-02-23 ASSESSMENT — LIFESTYLE VARIABLES
HOW MANY STANDARD DRINKS CONTAINING ALCOHOL DO YOU HAVE ON A TYPICAL DAY: PATIENT DOES NOT DRINK
HOW OFTEN DO YOU HAVE A DRINK CONTAINING ALCOHOL: NEVER

## 2023-02-23 NOTE — PATIENT INSTRUCTIONS
Preventing Falls: Care Instructions  Overview     Getting around your home safely can be a challenge if you have injuries or health problems that make it easy for you to fall. Loose rugs and furniture in walkways are among the dangers for many older people who have problems walking or who have poor eyesight. People who have conditions such as arthritis, osteoporosis, or dementia also have to be careful not to fall. You can make your home safer with a few simple measures. Follow-up care is a key part of your treatment and safety. Be sure to make and go to all appointments, and call your doctor if you are having problems. It's also a good idea to know your test results and keep a list of the medicines you take. How can you care for yourself at home? Taking care of yourself  Exercise regularly to improve your strength, muscle tone, and balance. Walk if you can. Swimming may be a good choice if you cannot walk easily. Have your vision and hearing checked each year or any time you notice a change. If you have trouble seeing and hearing, you might not be able to avoid objects and could lose your balance. Know the side effects of the medicines you take. Ask your doctor or pharmacist whether the medicines you take can affect your balance. Sleeping pills or sedatives can affect your balance. Limit the amount of alcohol you drink. Alcohol can impair your balance and other senses. Ask your doctor whether calluses or corns on your feet need to be removed. If you wear loose-fitting shoes because of calluses or corns, you can lose your balance and fall. Talk to your doctor if you have numbness in your feet. You may get dizzy if you do not drink enough water. To prevent dehydration, drink plenty of fluids. Choose water and other clear liquids. If you have kidney, heart, or liver disease and have to limit fluids, talk with your doctor before you increase the amount of fluids you drink.   Preventing falls at home  Remove raised doorway thresholds, throw rugs, and clutter. Repair loose carpet or raised areas in the floor. Move furniture and electrical cords to keep them out of walking paths. Use nonskid floor wax, and wipe up spills right away, especially on ceramic tile floors. If you use a walker or cane, put rubber tips on it. If you use crutches, clean the bottoms of them regularly with an abrasive pad, such as steel wool. Keep your house well lit, especially stairways, porches, and outside walkways. Use night-lights in areas such as hallways and bathrooms. Add extra light switches or use remote switches (such as switches that go on or off when you clap your hands) to make it easier to turn lights on if you have to get up during the night. Install sturdy handrails on stairways. Move items in your cabinets so that the things you use a lot are on the lower shelves (about waist level). Keep a cordless phone and a flashlight with new batteries by your bed. If possible, put a phone in each of the main rooms of your house, or carry a cell phone in case you fall and cannot reach a phone. Or, you can wear a device around your neck or wrist. You push a button that sends a signal for help. Wear low-heeled shoes that fit well and give your feet good support. Use footwear with nonskid soles. Check the heels and soles of your shoes for wear. Repair or replace worn heels or soles. Do not wear socks without shoes on smooth floors, such as wood. Walk on the grass when the sidewalks are slippery. If you live in an area that gets snow and ice in the winter, sprinkle salt on slippery steps and sidewalks. Or ask a family member or friend to do this for you. Preventing falls in the bath  Install grab bars and nonskid mats inside and outside your shower or tub and near the toilet and sinks. Use shower chairs and bath benches. Use a hand-held shower head that will allow you to sit while showering.   Get into a tub or shower by putting the weaker leg in first. Get out of a tub or shower with your strong side first.  Repair loose toilet seats and consider installing a raised toilet seat to make getting on and off the toilet easier. Keep your bathroom door unlocked while you are in the shower. Where can you learn more? Go to http://www.hogue.com/ and enter G117 to learn more about \"Preventing Falls: Care Instructions. \"  Current as of: May 4, 2022               Content Version: 13.5  © 5769-6834 Healthwise, Incorporated. Care instructions adapted under license by [facility] ([facility]). If you have questions about a medical condition or this instruction, always ask your healthcare professional. [address] any warranty or liability for your use of this information. Hearing Loss: Care Instructions  Overview     Hearing loss is a sudden or slow decrease in how well you hear. It can range from mild to severe. Permanent hearing loss can occur with aging. It also can happen when you are exposed long-term to loud noise. Examples include listening to loud music, riding motorcycles, or being around other loud machines. Hearing loss can affect your work and home life. It can make you feel lonely or depressed. You may feel that you have lost your independence. But hearing aids and other devices can help you hear better and feel connected to others. Follow-up care is a key part of your treatment and safety. Be sure to make and go to all appointments, and call your doctor if you are having problems. It's also a good idea to know your test results and keep a list of the medicines you take. How can you care for yourself at home? Avoid loud noises whenever possible. This helps keep your hearing from getting worse. Always wear hearing protection around loud noises. Wear a hearing aid as directed. See a professional who can help you pick a hearing aid that fits you. Have hearing tests as your doctor suggests. They can show whether your hearing has changed. Your hearing aid may need to be adjusted. Use other devices as needed. These may include:  Telephone amplifiers and hearing aids that can connect to a television, stereo, radio, or microphone. Devices that use lights or vibrations. These alert you to the doorbell, a ringing telephone, or a baby monitor. Television closed-captioning. This shows the words at the bottom of the screen. Most new TVs can do this. TTY (text telephone). This lets you type messages back and forth on the telephone instead of talking or listening. These devices are also called TDD. When messages are typed on the keyboard, they are sent over the phone line to a receiving TTY. The message is shown on a monitor. Use text messaging, social media, and email if it is hard for you to communicate by telephone. Try to learn a listening technique called speechreading. It is not lipreading. You pay attention to people's gestures, expressions, posture, and tone of voice. These clues can help you understand what a person is saying. Face the person you are talking to, and have them face you. Make sure the lighting is good. You need to see the other person's face clearly. Think about counseling if you need help to adjust to your hearing loss. When should you call for help? Watch closely for changes in your health, and be sure to contact your doctor if:    You think your hearing is getting worse.     You have new symptoms, such as dizziness or nausea. Where can you learn more? Go to http://www.[employer].com/ and enter R798 to learn more about \"Hearing Loss: Care Instructions. \"  Current as of: May 4, 2022               Content Version: 13.5  © 8620-1802 Healthwise, Incorporated. Care instructions adapted under license by [facility] ([facility]).  If you have questions about a medical condition or this instruction, always ask your healthcare professional. [place] any warranty or liability for your use of this information. Learning About Activities of Daily Living  What are activities of daily living? Activities of daily living (ADLs) are the basic self-care tasks you do every day. As you age, and if you have health problems, you may find that it's harder to do these things for yourself. That's when you may need some help. Your doctor uses ADLs to measure how much help you need. Knowing what you can and can't do for yourself is an important first step to getting help. And when you have the help you need, you can stay as independent as possible. Your doctor will want to know if you are able to do tasks such as: Take a bath or shower without help. Go to the bathroom by yourself. Dress and undress without help. Shave, comb your hair, and brush teeth on your own. Get in and out of bed or a chair without help. Feed yourself without help. If you are having trouble doing basic self-care tasks, talk with your doctor. You may want to bring a caregiver or family member who can help the doctor understand your needs and abilities. How will a doctor assess your ADLs? Asking about ADLs is part of a routine health checkup your doctor will likely do as you age. Your health check might be done in a doctor's office, in your home, or at a hospital. The goal is to find out if you are having any problems that could make your health problems worse or that make it unsafe for you to be on your own. To measure your ADLs, your doctor will ask how hard it is for you to do routine tasks. He or she may also want to know if you have changed the way you do a task because of a health problem. He or she may watch how you:  Walk back and forth. Keep your balance while you stand or walk. Move from sitting to standing or from a bed to a chair. Button or unbutton a shirt or sweater. Remove and put on your shoes.   It's normal to feel a little worried or anxious if you find you can't do all the things you used to be able to do. Talking with your doctor about ADLs isn't a test that you either pass or fail. It's just a way to get more information about your health and safety. Follow-up care is a key part of your treatment and safety. Be sure to make and go to all appointments, and call your doctor if you are having problems. It's also a good idea to know your test results and keep a list of the medicines you take. Current as of: October 6, 2021               Content Version: 13.5  © 2006-2022 Healthwise, [employer]. Care instructions adapted under license by [facility] ([facility] [place]). If you have questions about a medical condition or this instruction, always ask your healthcare professional. [address] any warranty or liability for your use of this information. Advance Directives: Care Instructions  Overview  An advance directive is a legal way to state your wishes at the end of your life. It tells your family and your doctor what to do if you can't say what you want. There are two main types of advance directives. You can change them any time your wishes change. Living will. This form tells your family and your doctor your wishes about life support and other treatment. The form is also called a declaration. Medical power of . This form lets you name a person to make treatment decisions for you when you can't speak for yourself. This person is called a health care agent (health care proxy, health care surrogate). The form is also called a durable power of  for health care. If you do not have an advance directive, decisions about your medical care may be made by a family member, or by a doctor or a  who doesn't know you. It may help to think of an advance directive as a gift to the people who care for you. If you have one, they won't have to make tough decisions by themselves.   For more information, including forms for your state, see the [employer] website (www.caringinfo.org/planning/advance-directives/). Follow-up care is a key part of your treatment and safety. Be sure to make and go to all appointments, and call your doctor if you are having problems. It's also a good idea to know your test results and keep a list of the medicines you take. What should you include in an advance directive? Many states have a unique advance directive form. (It may ask you to address specific issues.) Or you might use a universal form that's approved by many states. If your form doesn't tell you what to address, it may be hard to know what to include in your advance directive. Use the questions below to help you get started. Who do you want to make decisions about your medical care if you are not able to? What life-support measures do you want if you have a serious illness that gets worse over time or can't be cured? What are you most afraid of that might happen? (Maybe you're afraid of having pain, losing your independence, or being kept alive by machines.)  Where would you prefer to die? (Your home? A hospital? A nursing home?)  Do you want to donate your organs when you die? Do you want certain Bahai practices performed before you die? When should you call for help? Be sure to contact your doctor if you have any questions. Where can you learn more? Go to http://www.hogue.com/ and enter R264 to learn more about \"Advance Directives: Care Instructions. \"  Current as of: June 16, 2022               Content Version: 13.5  © 2006-2022 Healthwise, Incorporated. Care instructions adapted under license by [facility] ([facility]). If you have questions about a medical condition or this instruction, always ask your healthcare professional. [address] any warranty or liability for your use of this information.            A Healthy Heart: Care Instructions  Your Care Instructions     Coronary artery disease, also called heart disease, occurs when a substance called plaque builds up in the vessels that supply oxygen-rich blood to your heart muscle. This can narrow the blood vessels and reduce blood flow. A heart attack happens when blood flow is completely blocked. A high-fat diet, smoking, and other factors increase the risk of heart disease. Your doctor has found that you have a chance of having heart disease. You can do lots of things to keep your heart healthy. It may not be easy, but you can change your diet, exercise more, and quit smoking. These steps really work to lower your chance of heart disease. Follow-up care is a key part of your treatment and safety. Be sure to make and go to all appointments, and call your doctor if you are having problems. It's also a good idea to know your test results and keep a list of the medicines you take. How can you care for yourself at home? Diet    Use less salt when you cook and eat. This helps lower your blood pressure. Taste food before salting. Add only a little salt when you think you need it. With time, your taste buds will adjust to less salt.     Eat fewer snack items, fast foods, canned soups, and other high-salt, high-fat, processed foods.     Read food labels and try to avoid saturated and trans fats. They increase your risk of heart disease by raising cholesterol levels.     Limit the amount of solid fat-butter, margarine, and shortening-you eat. Use olive, peanut, or canola oil when you cook. Bake, broil, and steam foods instead of frying them.     Eat a variety of fruit and vegetables every day. Dark green, deep orange, red, or yellow fruits and vegetables are especially good for you. Examples include spinach, carrots, peaches, and berries.     Foods high in fiber can reduce your cholesterol and provide important vitamins and minerals. High-fiber foods include whole-grain cereals and breads, oatmeal, beans, brown rice, citrus fruits, and apples.     Eat lean proteins. Heart-healthy proteins include seafood, lean meats and poultry, eggs, beans, peas, nuts, seeds, and soy products.     Limit drinks and foods with added sugar. These include candy, desserts, and soda pop. Lifestyle changes    If your doctor recommends it, get more exercise. Walking is a good choice. Bit by bit, increase the amount you walk every day. Try for at least 30 minutes on most days of the week. You also may want to swim, bike, or do other activities.     Do not smoke. If you need help quitting, talk to your doctor about stop-smoking programs and medicines. These can increase your chances of quitting for good. Quitting smoking may be the most important step you can take to protect your heart. It is never too late to quit.     Limit alcohol to 2 drinks a day for men and 1 drink a day for women. Too much alcohol can cause health problems.     Manage other health problems such as diabetes, high blood pressure, and high cholesterol. If you think you may have a problem with alcohol or drug use, talk to your doctor. Medicines    Take your medicines exactly as prescribed. Call your doctor if you think you are having a problem with your medicine.     If your doctor recommends aspirin, take the amount directed each day. Make sure you take aspirin and not another kind of pain reliever, such as acetaminophen (Tylenol). When should you call for help? Call 911 if you have symptoms of a heart attack. These may include:    Chest pain or pressure, or a strange feeling in the chest.     Sweating.     Shortness of breath.     Pain, pressure, or a strange feeling in the back, neck, jaw, or upper belly or in one or both shoulders or arms.     Lightheadedness or sudden weakness.     A fast or irregular heartbeat. After you call 911, the  may tell you to chew 1 adult-strength or 2 to 4 low-dose aspirin. Wait for an ambulance. Do not try to drive yourself.   Watch closely for changes in your health, and be sure to contact your doctor if you have any problems. Where can you learn more? Go to http://www.hogue.com/ and enter F075 to learn more about \"A Healthy Heart: Care Instructions. \"  Current as of: September 7, 2022               Content Version: 13.5  © 9723-8764 Healthwise, Incorporated. Care instructions adapted under license by [facility] ([facility]). If you have questions about a medical condition or this instruction, always ask your healthcare professional. [place] [address] any warranty or liability for your use of this information. Personalized Preventive Plan for Ceasar Rocha - 2/23/2023  Medicare offers a range of preventive health benefits. Some of the tests and screenings are paid in full while other may be subject to a deductible, co-insurance, and/or copay. Some of these benefits include a comprehensive review of your medical history including lifestyle, illnesses that may run in your family, and various assessments and screenings as appropriate. After reviewing your medical record and screening and assessments performed today your provider may have ordered immunizations, labs, imaging, and/or referrals for you. A list of these orders (if applicable) as well as your Preventive Care list are included within your After Visit Summary for your review. Other Preventive Recommendations:    A preventive eye exam performed by an eye specialist is recommended every 1-2 years to screen for glaucoma; cataracts, macular degeneration, and other eye disorders. A preventive dental visit is recommended every 6 months. Try to get at least 150 minutes of exercise per week or 10,000 steps per day on a pedometer . Order or download the FREE \"Exercise & Physical Activity: Your Everyday Guide\" from [employer]. Call 6-656.164.4692 or search [employer] online. You need 3520-4769 mg of calcium and 0415-9992 IU of vitamin D per day.  It is possible to meet your calcium requirement with diet alone, but a vitamin D supplement is usually necessary to meet this goal.  When exposed to the sun, use a sunscreen that protects against both UVA and UVB radiation with an SPF of 30 or greater. Reapply every 2 to 3 hours or after sweating, drying off with a towel, or swimming. Always wear a seat belt when traveling in a car. Always wear a helmet when riding a bicycle or motorcycle.

## 2023-02-23 NOTE — PROGRESS NOTES
Medicare Annual Wellness Visit    Elaina Mtz is here for Medicare AWV    Assessment & Plan   Medicare annual wellness visit, subsequent  ASHD (arteriosclerotic heart disease)  -     rosuvastatin (CRESTOR) 5 MG tablet; Take 1 tablet by mouth daily, Disp-90 tablet, R-3Normal  -     gemfibrozil (LOPID) 600 MG tablet; Take 1 tablet by mouth 2 times daily (before meals), Disp-180 tablet, R-3Normal  Essential hypertension  -     hydroCHLOROthiazide (HYDRODIURIL) 25 MG tablet; Take 1 tablet by mouth daily, Disp-90 tablet, R-3Normal  -     terazosin (HYTRIN) 5 MG capsule; Take 1 capsule by mouth nightly, Disp-90 capsule, R-3Normal  Chronic superficial gastritis without bleeding  Pilonidal cyst without abscess  -     cephALEXin (KEFLEX) 500 MG capsule; Take 1 capsule by mouth 3 times daily for 10 days, Disp-30 capsule, R-0Normal  Physical deconditioning  -     [facility] [place]  Unstable gait  -     [facility] [place]        Chronic problems as above. Refill medications as above    Healing cellulitis of left buttock area. No abscess. May have been a pilonidal cyst.  Treat with another course of Keflex. Physical therapy for his unsteady gait and physical deconditioning. His mobility has decreased significantly. Recommendations for Preventive Services Due: see orders and patient instructions/AVS.  Recommended screening schedule for the next 5-10 years is provided to the patient in written form: see Patient Instructions/AVS.     Return in about 3 months (around 5/23/2023) for unsteady gait; . Subjective       Had egd since last appt. On protonix and budesonide now    Wants buttock cyst looked at. Mild pain with sitting. Patient's complete Health Risk Assessment and screening values have been reviewed and are found in Flowsheets. The following problems were reviewed today and where indicated follow up appointments were made and/or referrals ordered.     Positive Risk Factor Screenings with Interventions:    Fall Risk:  Do you feel unsteady or are you worried about falling? : (!) yes  2 or more falls in past year?: no  Fall with injury in past year?: no     Interventions:    Patient declines any further evaluation or treatment                   ADL's:   Patient reports needing help with:  Select all that apply: (!) Walking/Balance  Select all that apply: [employer], Housekeeping, Banking/Finances, Shopping, Food Preparation, Transportation, Taking Medications    Interventions:  Patient declined any further interventions or treatment                    Objective   Vitals:    02/23/23 1043   BP: 116/72   Site: Right Upper Arm   Position: Sitting   Pulse: 60   Resp: 16   Temp: 97.3 °F (36.3 °C)   SpO2: 98%   Weight: 157 lb 3.2 oz (71.3 kg)   Height: 6' 1\" (1.854 m)      Body mass index is 20.74 kg/m². Physical Exam  Vitals reviewed. Constitutional:       General: He is not in acute distress. Appearance: He is well-developed. Cardiovascular:      Rate and Rhythm: Normal rate and regular rhythm. Pulmonary:      Effort: Pulmonary effort is normal. No respiratory distress. Breath sounds: Normal breath sounds. No wheezing. Musculoskeletal:      Right lower leg: Edema (1+) present. Left lower leg: Edema (1+) present. Neurological:      Mental Status: He is alert. Mental status is at baseline. Psychiatric:         Mood and Affect: Mood normal.         Behavior: Behavior normal.     Slow unsteady gait. Left buttock at midline with erythema. No drainage. Skin with scab present. Allergies   Allergen Reactions    Latex Rash    Gluten Meal     Vasotec [Enalaprilat] Other (See Comments)     cough    Neosporin [Neomycin-Polymyxin-Gramicidin] Rash    Sulfa Antibiotics Rash    Voltaren [Diclofenac Sodium] Rash     gel     Prior to Visit Medications    Medication Sig Taking?  Authorizing Provider   pantoprazole (PROTONIX) 40 MG tablet TAKE 1 TABLET BY MOUTH ONCE DAILY Yes Historical Provider, MD   hydroCHLOROthiazide (HYDRODIURIL) 25 MG tablet Take 25 mg by mouth daily Yes Historical Provider, MD   budesonide (ENTOCORT EC) 3 MG extended release capsule  Yes Historical Provider, MD   terazosin (HYTRIN) 5 MG capsule Take 5 mg by mouth nightly Yes Historical Provider, MD   carvedilol (COREG) 25 MG tablet Take 1 tablet by mouth 2 times daily (with meals) Yes Sandra Kirby MD   captopril (CAPOTEN) 25 MG tablet Take 0.5 tablets by mouth 2 times daily Yes Sandra Kirby MD   ELIQUIS 5 MG TABS tablet Take 1 tablet by mouth 2 times daily Yes Sandra Kirby MD   baclofen (LIORESAL) 10 MG tablet Take 1 tablet by mouth 3 times daily as needed (muscle spasm) Yes Mae Bowie MD   rosuvastatin (CRESTOR) 5 MG tablet Take 1 tablet by mouth daily Yes Mae Bowie MD   finasteride (PROSCAR) 5 MG tablet Take 1 tablet by mouth daily Yes SID Mcintyre - CNP   mesalamine (DELZICOL) 800 MG TBEC TBEC tablet Take 800 mg by mouth daily  Yes Historical Provider, MD   gemfibrozil (LOPID) 600 MG tablet Take 1 tablet by mouth 2 times daily (before meals).  Yes Tyrese Shah MD   Coenzyme Q10 (COQ10) 200 MG CAPS Take 200 mg/day by mouth 2 times daily  Yes Tyrese Shah MD       [facility] (Including outside providers/suppliers regularly involved in providing care):   Patient Care Team:  Mae Bowie MD as PCP - General (Family Medicine)  Mae Bowie MD as PCP - Empaneled Provider     Reviewed and updated this visit:  Tobacco  Allergies  Meds  Problems  Med Hx  Surg Hx  Soc Hx  Fam Hx               Mae Bowie MD

## 2023-02-28 ENCOUNTER — NURSE ONLY (OUTPATIENT)
Dept: CARDIOLOGY CLINIC | Age: 83
End: 2023-02-28
Payer: MEDICARE

## 2023-02-28 DIAGNOSIS — Z95.0 PACEMAKER: Primary | ICD-10-CM

## 2023-02-28 PROCEDURE — 93280 PM DEVICE PROGR EVAL DUAL: CPT | Performed by: INTERNAL MEDICINE

## 2023-02-28 NOTE — PROGRESS NOTES
Medtronic dual pacer in office   Switching from Dr Phillip Estrada to Natalia Kirkland  1st check in our office   Did get carelink paired, given schedule   Programmed DDDR/chronic AF     AF/eliquis   2-4.5 years on device  avg 3      Presenting rhythm  AF     Atrial impedance 559  RV impedance 490    R wave sensing none  he's dependent     36.2% atrial paced   76.9 % RV paced     RV threshold 1.25 V at 0.4ms

## 2023-03-02 ENCOUNTER — TELEPHONE (OUTPATIENT)
Dept: FAMILY MEDICINE CLINIC | Age: 83
End: 2023-03-02

## 2023-03-02 DIAGNOSIS — L05.91 PILONIDAL CYST WITHOUT ABSCESS: Primary | ICD-10-CM

## 2023-03-02 NOTE — TELEPHONE ENCOUNTER
Patient informed and verbalized understanding. Referral sent to [facility] @ [phone].

## 2023-03-02 NOTE — TELEPHONE ENCOUNTER
----- Message from Ariel[address]levelandaat [address] sent at 3/2/2023 11:43 AM EST -----  Subject: Referral Request    Reason for referral request? Patient has a pilonidal cyst and would like a   referral to have it looked at and removed  Provider patient wants to be referred to(if known):     Provider Phone Number(if known):     Additional Information for Provider?   ---------------------------------------------------------------------------  --------------  [id number]    [phone]; OK to leave message on voicemail  ---------------------------------------------------------------------------  --------------

## 2023-03-06 ENCOUNTER — TELEPHONE (OUTPATIENT)
Dept: FAMILY MEDICINE CLINIC | Age: 83
End: 2023-03-06

## 2023-03-06 ENCOUNTER — HOSPITAL ENCOUNTER (OUTPATIENT)
Dept: PHYSICAL THERAPY | Age: 83
Setting detail: THERAPIES SERIES
Discharge: HOME | End: 2023-03-06
Payer: MEDICARE

## 2023-03-06 DIAGNOSIS — I25.10 ASHD (ARTERIOSCLEROTIC HEART DISEASE): ICD-10-CM

## 2023-03-06 DIAGNOSIS — R26.81 UNSTABLE GAIT: Primary | ICD-10-CM

## 2023-03-06 PROCEDURE — 97163 PT EVAL HIGH COMPLEX 45 MIN: CPT

## 2023-03-06 NOTE — TELEPHONE ENCOUNTER
Patients wife came in stating that the patient needs a script for his handicap placard. Patient will pick it up once it is printed and signed.

## 2023-03-06 NOTE — TELEPHONE ENCOUNTER
Patients wife mentioned that they would stop back in after his therapy appointment [place] the [place].

## 2023-03-06 NOTE — PROGRESS NOTES
** PLEASE SIGN, DATE AND TIME CERTIFICATION BELOW AND RETURN TO [facility] OUTPATIENT REHABILITATION (FAX #: [phone]). ATTEST/CO-SIGN IF ACCESSING VIA [employer]. THANK YOU.**    I certify that I have examined the patient below and determined that Physical Medicine and Rehabilitation service is necessary and that I approve the established plan of care for up to 90 days or as specifically noted.   Attestation, signature or co-signature of physician indicates approval of certification requirements.    ________________________ ____________ __________  Physician Signature   Date   Time   [address] [facility]  PHYSICAL THERAPY  [x] EVALUATION  [] DAILY NOTE (LAND) [] DAILY NOTE (AQUATIC ) [] PROGRESS NOTE [] DISCHARGE NOTE    [] [facility]   [x] [address] [address]    [] [address] [facility]   [] Gayle Armando    Date: 3/6/2023  Patient Name:  Thierry Bishop  : 1940  MRN: [id number]  CSN: [id number]    Referring Practitioner Cindy Fatima MD   Diagnosis Physical deconditioning [R53.81]  Unstable gait [R26.81]    Treatment Diagnosis B LE weakness, decreased balance, difficulty walking   Date of Evaluation 3/6/23   Additional Pertinent History Pacemaker 20 years ago, arthritis, stomach inflammation, HTN, R hip pain with OA      Functional Outcome Measure Used Tinetti balance test   Functional Outcome Score Eval  with high risk of falls (3/6/23)       Insurance: Primary: Payor: Kuldip Jensen /  /  / ,   Secondary:    Authorization Information: INSURANCE PAYS AT: 100% AFTER CO PAY                PATIENT RESPONSIBILITY AND/OR CO-PAY: $40 CO PAY  SECONDARY INSURANCE COMPANY:        PRE CERTIFICATION REQUIRED: YES THROUGH REBECCA ( [phone] )  INSURANCE THERAPY BENEFIT:  UNLIMITED VISITS BASED ON MEDICAL NECESSITY  AQUATIC THERAPY COVERED: NO  MODALITIES COVERED:  NO   Visit # 1, 1/10 for progress note   Visits Allowed: 1   Recertification Date: 6/3/23   Physician Follow-Up: Consult with Dr. Cat Downey on 3/15 for cyst on buttock L      Physician Orders:    History of Present Illness: Patient reports having increased weight loss in past 3 months, lost 25 # in 3 months, poor appetite, GI did upper GI x 3 weeks ago, medication added immediately due to stomach inflammation. Does feel that medication is helping and is also drinking 2 ensure per day. Weakness gradually increased over past 3 months with weight loss. Difficulty with sit to stand and has had to start taking meals in lift recliner chair. Difficulty with sit to stand from elevated toilet seat with arms. Difficulty putting socks and shoes on, putting pants on, R >L LE weakness, no falls in past year. Gets up to walk 6 x per day. SUBJECTIVE: has 4 wheeled walker, cane, RW, lift chair,     Social/Functional History and Current Status:  Medications and Allergies have been reviewed and are listed on Medical History Questionnaire. Abena Jiménez lives with spouse in a single story home with stairs and no handrail to enter. 1 ADELSO wtithout HR. Has walk in shower with walk in house, stands to shower . Task Previous Current   ADLs  Independent Modified Independent  patients wife assisting with putting compression socks and shoes/slippers  on,    IADL's Independent Modified Independent   Ambulation Independent Modified Independent   Transfers Independent Modified Independent difficulty sit to stand from kitchen chair, difficulty getting in/out of car   R [address] Dependent/Unable unable to go to [employer] for past 3 months due to difficulty sit to stand and getting in/out of [employer]. Has not been out to eat x 3 months.      Driving Active  Active   does have license but has not driven x 2 months, would like to get back to driving   Work Retired   Other appointments:  3/10- 11 am taxes  3/15- 6 am  [place]  4/12- 10:15 MD appt for wife  4/17 9:40 am appt for wife  5/3 10:45 MD appt  5/15 Dr. Richard March  5/23 Dr. Quique Maki Retired       OBJECTIVE:  Pain L buttock cyst with pain with sitting 5/10  R hip pain intermittent 5/10       Observation Wearing slippers into clinic, PT discussed more supportive shoes with walking/PT appointment   Posture fair        Range of Motion Hip: L hip flexion 20, abduction 8 degrees, R hip flexion 18 degrees, abduction 5 degrees  Knee: L knee 0- 120 degrees, R knee 0- 80 with hip ER  Ankle: B ankle DF to 5 degrees, PF 30 degrees   Strength Right Lower Extremity:  Impaired - hip 2/5, knee 3-/5, ankle 3/5  Left Lower Extremity:   Impaired - hip 3-/5, knee 3/5, ankle 3+/5   Coordination Impaired - slow rapid toe tapping B   Sensation WFL   Bed Mobility Impaired - min assist getting right leg in/out of bed   Transfers CGA with with sit to stand. Ambulation Stand By Assistance  Distance: 100 feet  Surface: Level Tile  Device:4 Wheeled Walker  Gait Deviations:   Forward Flexed Posture, Slow Inez, Decreased Step Length Bilaterally, Decreased Heel Strike Bilaterally, [address] of Support, and Unsteady Gait   Balance Tinetti: 16/28               TREATMENT   Precautions: Pacemaker, falls risk   Pain: denies    \"X in shaded column indicates activity completed today    *\" next to exercise/intervention indicates progression   Modalities Parameters/  Location  Notes                     Manual Therapy Time/Technique  Notes                     Exercise/Intervention   Notes   Ankle pump B 10x  x    Quad set 10x 5s x    SAQ R then L  5x 5s x    LAQ R then L 5x 5s x                                                       Specific Interventions Next Treatment: NuStep, backward shoulder circles, scapular retraction, supine knees bent ball squeeze, B LE AROM , strengthening, stretching, progress to standing, balance, gait training, total gym    Activity/Treatment Tolerance:  []  Patient tolerated treatment well  [x]  Patient limited by fatigue  []  Patient limited by pain   []  Patient limited by medical complications  []  Other:     Assessment: PT for exercises and gait training to improve B LE AROM, strengthening, balance, gait to improve I with ADL's in home and get this patient able to get out in community with improved strength, endurance and mobility  Body Structures/Functions/Activity Limitations: impaired balance, impaired ROM, impaired strength, pain, and abnormal gait  Prognosis: fair    GOALS:  Patient Goal: to get back to walking better    Short Term Goals:  Time Frame: 4 weeks   Increase B hip flexion to 35 degrees, R knee flexion supine to to 100 degrees to allow patient to report able to progress to return to taking meal in kitchen using back of chair and stand to stand  Increase strength B knee and ankle to 4-/5 to allow sit to stand from varied heights mod I with no cues on hand placement  I with HEP as prescribed to improve balance Tinetti balance improved to 20/28 to reduce risk of falls      Long Term Goals:  Time Frame:  8 weeks  Increase B hip flexion to 50, R knee flexion to 110 degrees to allow patient to report able to walk household distance with cane with no LOB  Increase strength B LE hip to 3+/5, knee and ankle to 4/5 to allow patient to report able to walk in/out of [employer] with cane with no LOB  I with HEP as prescribed with Tinetti balance 22/28 to allow patient to report able to go to store with wife to walk 10 minutes with cart with no rest break      Patient Education:   [x]  HEP/Education Completed: Plan of Care, Goals, HEP of above exercises  [employer] Access Code:  []  No new Education completed  []  Reviewed Prior HEP      []  Patient verbalized and/or demonstrated understanding of education provided. []  Patient unable to verbalize and/or demonstrate understanding of education provided. Will continue education.   [x]  Barriers to learning: handouts needed    PLAN:  Treatment Recommendations: Strengthening, Range of Motion, Balance Training, Transfer Training, Gait Training, Stair Training, Pain Management, Home Exercise Program, and Patient Education    [x]  Plan of care initiated. Plan to see patient 2 times per week for 8 weeks to address the treatment planned outlined above.   []  Continue with current plan of care  []  Modify plan of care as follows:    []  Hold pending physician visit  []  Discharge    Time In 1010   Time Out 1100   Timed Code Minutes: 5 min   Total Treatment Time: 50 min       Electronically Signed by: Gary Nieves PT

## 2023-03-15 ENCOUNTER — TELEPHONE (OUTPATIENT)
Dept: SURGERY | Age: 83
End: 2023-03-15

## 2023-03-15 ENCOUNTER — OFFICE VISIT (OUTPATIENT)
Dept: SURGERY | Age: 83
End: 2023-03-15
Payer: MEDICARE

## 2023-03-15 VITALS
WEIGHT: 160 LBS | OXYGEN SATURATION: 98 % | SYSTOLIC BLOOD PRESSURE: 121 MMHG | HEART RATE: 60 BPM | TEMPERATURE: 97.2 F | BODY MASS INDEX: 21.2 KG/M2 | HEIGHT: 73 IN | DIASTOLIC BLOOD PRESSURE: 60 MMHG

## 2023-03-15 DIAGNOSIS — Z01.818 PRE-OP TESTING: ICD-10-CM

## 2023-03-15 DIAGNOSIS — I10 ESSENTIAL HYPERTENSION: ICD-10-CM

## 2023-03-15 DIAGNOSIS — L05.91 PILONIDAL CYST: Primary | ICD-10-CM

## 2023-03-15 PROCEDURE — 3078F DIAST BP <80 MM HG: CPT | Performed by: SURGERY

## 2023-03-15 PROCEDURE — 1123F ACP DISCUSS/DSCN MKR DOCD: CPT | Performed by: SURGERY

## 2023-03-15 PROCEDURE — 99203 OFFICE O/P NEW LOW 30 MIN: CPT | Performed by: SURGERY

## 2023-03-15 PROCEDURE — 3074F SYST BP LT 130 MM HG: CPT | Performed by: SURGERY

## 2023-03-15 NOTE — TELEPHONE ENCOUNTER
Pre op Risk Assessment    Procedure Pilonidal Cystectomy  Physician Dr. Harriett Davey  Date of surgery/procedure 4-20-23    Last OV 2-16-23  Last Stress 3-3-22  Last Echo 3-16-22  Last Cath 3-9-22  Last Stent None in Epic  Is patient on blood thinners Eliquis  Hold Meds/how many days ?

## 2023-03-15 NOTE — TELEPHONE ENCOUNTER
Pt of Dr. Lan Morse last seen 2/16/23 is scheduled for pilonidal cystectomy on 4/20/23 with Dr. Nancy Sexton. Can he be cleared for surgery? Ok to hold Eliquis 2 days prior? Nutrition, metabolism, and development symptoms

## 2023-03-16 ENCOUNTER — HOSPITAL ENCOUNTER (OUTPATIENT)
Dept: PHYSICAL THERAPY | Age: 83
Setting detail: THERAPIES SERIES
Discharge: HOME | End: 2023-03-16
Payer: MEDICARE

## 2023-03-16 PROCEDURE — 97110 THERAPEUTIC EXERCISES: CPT

## 2023-03-16 ASSESSMENT — ENCOUNTER SYMPTOMS
EYE ITCHING: 0
EYE REDNESS: 0
COLOR CHANGE: 0
SHORTNESS OF BREATH: 0
DIARRHEA: 0
WHEEZING: 0
EYE DISCHARGE: 0
ABDOMINAL PAIN: 0
ANAL BLEEDING: 0
PHOTOPHOBIA: 0
TROUBLE SWALLOWING: 0
ABDOMINAL DISTENTION: 0
CONSTIPATION: 0
NAUSEA: 0
SINUS PRESSURE: 0
SORE THROAT: 0
FACIAL SWELLING: 0
CHOKING: 0
RHINORRHEA: 0
RECTAL PAIN: 0
ALLERGIC/IMMUNOLOGIC NEGATIVE: 1
COUGH: 0
EYE PAIN: 0
BACK PAIN: 0
VOMITING: 0
VOICE CHANGE: 0
CHEST TIGHTNESS: 0
BLOOD IN STOOL: 0
STRIDOR: 0
APNEA: 0

## 2023-03-16 NOTE — PROGRESS NOTES
[address] [facility]  PHYSICAL THERAPY  [] EVALUATION  [x] DAILY NOTE (LAND) [] DAILY NOTE (AQUATIC ) [] PROGRESS NOTE [] DISCHARGE NOTE    [] [address] [facility]   [x] [place]     [] [facility]   [] Kaleb Chappell    Date: 3/16/2023  Patient Name:  Christian Cardona  : 1940  MRN: [id number]  CSN: [id number]    Referring Practitioner Lorenza Turpin MD   Diagnosis Physical deconditioning [R53.81]  Unstable gait [R26.81]    Treatment Diagnosis B LE weakness, decreased balance, difficulty walking   Date of Evaluation 3/6/23   Additional Pertinent History Pacemaker 20 years ago, arthritis, stomach inflammation, HTN, R hip pain with OA      Functional Outcome Measure Used Tinetti balance test   Functional Outcome Score Eval  with high risk of falls (3/6/23)       Insurance: Primary: Payor: Jefe Alarcon /  /  / ,   Secondary:    Authorization Information: INSURANCE PAYS AT: 100% AFTER CO PAY                PATIENT RESPONSIBILITY AND/OR CO-PAY: $40 CO PAY  SECONDARY INSURANCE COMPANY:        PRE CERTIFICATION REQUIRED: YES THROUGH REBECCA ( [phone] )  INSURANCE THERAPY BENEFIT:  UNLIMITED VISITS BASED ON MEDICAL NECESSITY  AQUATIC THERAPY COVERED: NO  MODALITIES COVERED:  NO   Visit # 2, 2/10 for progress note   Visits Allowed: 1 + 6= 7   Recertification Date: 99   Physician Follow-Up: Consult with Dr. Harriett Davey on 3/15 for cyst on buttock L , surgery scheduled to remove cyst on      Physician Orders:    History of Present Illness: Patient reports having increased weight loss in past 3 months, lost 25 # in 3 months, poor appetite, GI did upper GI x 3 weeks ago, medication added immediately due to stomach inflammation. Does feel that medication is helping and is also drinking 2 ensure per day. Weakness gradually increased over past 3 months with weight loss.    Difficulty with sit to stand and has had to start taking meals in lift recliner chair. Difficulty with sit to stand from elevated toilet seat with arms. Difficulty putting socks and shoes on, putting pants on, R >L LE weakness, no falls in past year. Gets up to walk 6 x per day. SUBJECTIVE:  reports doing HEP daily, is having cyst removed L buttock 4/20. No pain walking in with 4 wheeled walker      OBJECTIVE:    TREATMENT   Precautions: Pacemaker, falls risk, cyst on L buttock- avoid pain in this area- surgery to remove 4/20   Pain: denies    \"X in shaded column indicates activity completed today    *\" next to exercise/intervention indicates progression   Modalities Parameters/  Location  Notes                     Manual Therapy Time/Technique  Notes                     Exercise/Intervention   Notes   Ankle pump B 10x  x    Quad set 10x 5s x                  SAQ R then L  5x 5s x    LAQ R then L 5x 5s x    Sitting heel raises B 10 x  x    Sitting toe raises B 10x  x    Sitting marching 10x alternating leg, difficulty with R  x    Gait training hallway 1 x 50 feet, cues heel to toe,  feet with walking  x                           Specific Interventions Next Treatment: NuStep, backward shoulder circles, scapular retraction, supine knees bent ball squeeze, B LE AROM , strengthening, stretching, progress to standing, balance, gait training, total gym    Activity/Treatment Tolerance:  []  Patient tolerated treatment well  [x]  Patient limited by fatigue  []  Patient limited by pain   []  Patient limited by medical complications  []  Other:     Assessment: added sitting exercises to HEP.   May try NuStep next visit LE only but if L buttock pain discontinue due to cyst.    GOALS:  Patient Goal: to get back to walking better    Short Term Goals:  Time Frame: 4 weeks   Increase B hip flexion to 35 degrees, R knee flexion supine to to 100 degrees to allow patient to report able to progress to return to taking meal in kitchen using back of chair and stand to stand  Increase strength B knee and ankle to 4-/5 to allow sit to stand from varied heights mod I with no cues on hand placement  I with HEP as prescribed to improve balance Tinetti balance improved to 20/28 to reduce risk of falls      Long Term Goals:  Time Frame:  8 weeks  Increase B hip flexion to 50, R knee flexion to 110 degrees to allow patient to report able to walk household distance with cane with no LOB  Increase strength B LE hip to 3+/5, knee and ankle to 4/5 to allow patient to report able to walk in/out of [place] with cane with no LOB  I with HEP as prescribed with Tinetti balance 22/28 to allow patient to report able to go to store with wife to walk 10 minutes with cart with no rest break      Patient Education:   [x]  HEP/Education Completed: Plan of Care, Goals, HEP of above exercises  [employer] Access Code:  []  No new Education completed  []  Reviewed Prior HEP      []  Patient verbalized and/or demonstrated understanding of education provided. []  Patient unable to verbalize and/or demonstrate understanding of education provided. Will continue education. [x]  Barriers to learning: handouts needed    PLAN:  Treatment Recommendations: Strengthening, Range of Motion, Balance Training, Transfer Training, Gait Training, Stair Training, Pain Management, Home Exercise Program, and Patient Education    []  Plan of care initiated. Plan to see patient 2 times per week for 8 weeks to address the treatment planned outlined above.   [x]  Continue with current plan of care  []  Modify plan of care as follows:    []  Hold pending physician visit  []  Discharge    Time In 1300   Time Out 1330   Timed Code Minutes: 30 min   Total Treatment Time: 30 min       Electronically Signed by: Dave Templeton PT

## 2023-03-16 NOTE — PROGRESS NOTES
Orquidea Martinez (:  1940)     ASSESSMENT:  1. Pilonidal cystic disease    PLAN:  1. Schedule [facility]aston for excision pilonidal cystic disease. 2. He will undergo pre-operative clearance per anesthesia guidelines with risk factors listed under the past medical history diagnosis & problem list.  3. The risks, benefits and alternatives were discussed with Sierra Owusu including non-operative management. All questions answered. He understands and wishes to proceed with surgical intervention. 4. Restrictions discussed with Sierra Owusu and he expresses understanding. 5. He is advised to call back directly if there are further questions/concerns, or if his symptoms worsen prior to surgery. 6.  No signs of acute infection in need of antibiotics or I&D at this time    SUBJECTIVE/OBJECTIVE:    Chief Complaint   Patient presents with    Surgical Consult     NP ref by Dr. Marina Grant cyst      HPI  Sierra Owusu is an 54-year-old male who presents with his wife secondary to a pilonidal cystic disease just to the left of the midline of the buttock crease. He states about 5 weeks ago this swelled up and then eventually spontaneously popped. Foul-smelling drainage. Antibiotics improved this however it will not completely go away. Has since completed the antibiotic treatment. Has a scabbed area that falls off as it swells up. Intermittent small amount of drainage. Has not had any more active infection since the initial issue 5 weeks ago. Denies any trauma to the area but admits that he does sit a lot given his age and instability on his feet. No change in bowel function. No hematochezia or melena. No new urinary complaints. No other skin or subcutaneous lesions that he is aware of. No fever, chills or sweats. On Eliquis. No current chest pain. No abdominal pain. Review of Systems   Constitutional:  Negative for activity change, appetite change, chills, diaphoresis, fatigue, fever and unexpected weight change. HENT:  Negative for congestion, dental problem, drooling, ear discharge, ear pain, facial swelling, hearing loss, mouth sores, nosebleeds, postnasal drip, rhinorrhea, sinus pressure, sneezing, sore throat, tinnitus, trouble swallowing and voice change. Eyes:  Negative for photophobia, pain, discharge, redness, itching and visual disturbance. Respiratory:  Negative for apnea, cough, choking, chest tightness, shortness of breath, wheezing and stridor. Cardiovascular:  Negative for chest pain, palpitations and leg swelling. Gastrointestinal:  Negative for abdominal distention, abdominal pain, anal bleeding, blood in stool, constipation, diarrhea, nausea, rectal pain and vomiting. Endocrine: Negative. Genitourinary:  Negative for decreased urine volume, difficulty urinating, dysuria, enuresis, flank pain, frequency, genital sores, hematuria, penile discharge, penile pain, penile swelling, scrotal swelling, testicular pain and urgency. Musculoskeletal:  Negative for arthralgias, back pain, gait problem, joint swelling, myalgias, neck pain and neck stiffness. Skin:  Positive for wound. Negative for color change, pallor and rash. Allergic/Immunologic: Negative. Neurological:  Negative for dizziness, tremors, seizures, syncope, facial asymmetry, speech difficulty, weakness, light-headedness, numbness and headaches. Hematological:  Negative for adenopathy. Does not bruise/bleed easily. Psychiatric/Behavioral:  Negative for agitation, behavioral problems, confusion, decreased concentration, dysphoric mood, hallucinations, self-injury, sleep disturbance and suicidal ideas. The patient is not nervous/anxious and is not hyperactive.       Past Medical History:   Diagnosis Date    Arthritis     Atrial fibrillation [facility])     Atrial fibrillation ([facility] [address] 07/08/2020    CAD (coronary artery disease)     CHF (congestive heart failure) ([facility] [address]     Dyslipidemia 06/23/2021    Enlarged prostate 06/23/2021    GERD (gastroesophageal reflux disease)     Hyperlipidemia     Hypertension     Medtronic dual pacemaker  11/8/2022       Past Surgical History:   Procedure Laterality Date    BUNIONECTOMY Left 1990's    [facility]      Dr. Diaz [facility] Left 2015    Dr. Zeb Mehta    COLONOSCOPY      x2 Dr. Elliot Grossman  2016    Dr. Juliette Joseph Right 1960's    inguinal    PACEMAKER INSERTION  2008    Dr. Lamonte Greene  2016    Dr. Kaila Pichardo       Current Outpatient Medications   Medication [employer] by Does not apply route 5 years 1 each 0    rosuvastatin (CRESTOR) 5 MG tablet Take 1 tablet by mouth daily 90 tablet 3    hydroCHLOROthiazide (HYDRODIURIL) 25 MG tablet Take 1 tablet by mouth daily 90 tablet 3    gemfibrozil (LOPID) 600 MG tablet Take 1 tablet by mouth 2 times daily (before meals) 180 tablet 3    terazosin (HYTRIN) 5 MG capsule Take 1 capsule by mouth nightly 90 capsule 3    pantoprazole (PROTONIX) 40 MG tablet TAKE 1 TABLET BY MOUTH ONCE DAILY      carvedilol (COREG) 25 MG tablet Take 1 tablet by mouth 2 times daily (with meals) 180 tablet 3    captopril (CAPOTEN) 25 MG tablet Take 0.5 tablets by mouth 2 times daily 90 tablet 3    ELIQUIS 5 MG TABS tablet Take 1 tablet by mouth 2 times daily 180 tablet 3    baclofen (LIORESAL) 10 MG tablet Take 1 tablet by mouth 3 times daily as needed (muscle spasm) 270 tablet 1    finasteride (PROSCAR) 5 MG tablet Take 1 tablet by mouth daily 90 tablet 3    mesalamine (DELZICOL) 800 MG TBEC TBEC tablet Take 800 mg by mouth daily       Coenzyme Q10 (COQ10) 200 MG CAPS Take 200 mg/day by mouth 2 times daily  180 capsule 1     No current facility-administered medications for this visit.        Allergies   Allergen Reactions    Latex Rash    Gluten Meal     Vasotec [Enalaprilat] Other (See Comments)     cough Neosporin [Neomycin-Polymyxin-Gramicidin] Rash    Sulfa Antibiotics Rash    Voltaren [Diclofenac Sodium] Rash     gel       Family History   Problem Relation Age of Onset    Kidney Disease Mother     Cancer Father        Social History     Socioeconomic History    Marital status:      Spouse name: Not on file    Number of children: Not on file    Years of education: Not on file    Highest education level: Not on file   Occupational History    Not on file   Tobacco Use    Smoking status: Former     Packs/day: 0.50     Years: 15.00     Pack years: 7.50     Types: Cigarettes     Quit date: 1965     Years since quittin.2    Smokeless tobacco: Never   Substance and Sexual Activity    Alcohol use: No    Drug use: No    Sexual activity: Not Currently   Other Topics Concern    Not on file   Social History Narrative    Not on file     Social Determinants of Health     Financial Resource Strain: Low Risk     Difficulty of Paying Living Expenses: Not hard at all   Food Insecurity: No Food Insecurity    Worried About [address] in the Last Year: Never true    [address] [place] in the Last Year: Never true   Transportation Needs: Unknown    Lack of Transportation (Medical): Not on file    Lack of Transportation (Non-Medical): No   Physical Activity: Insufficiently Active    Days of Exercise per Week: 1 day    Minutes of Exercise per Session: 10 min   Stress: Not on file   Social Connections: Not on file   Intimate Partner Violence: Not on file   Housing Stability: Unknown    Unable to Pay for Housing in the Last Year: Not on file    Number of Places Lived in the Last Year: Not on file    Unstable Housing in the Last Year: No     Vitals:    03/15/23 1112   BP: 121/60   Site: Left Upper Arm   Position: Sitting   Cuff Size: Medium Adult   Pulse: 60   Temp: 97.2 °F (36.2 °C)   TempSrc: Temporal   SpO2: 98%   Weight: 160 lb (72.6 kg)   Height: 6' 1\" (1.854 m)     Body mass index is 21.11 kg/m².     Wt Readings from Last 3 Encounters:   03/15/23 160 lb (72.6 kg)   02/23/23 157 lb 3.2 oz (71.3 kg)   02/16/23 160 lb (72.6 kg)     Physical Exam  Vitals reviewed. Constitutional:       General: He is not in acute distress. Appearance: He is well-developed. He is not diaphoretic. HENT:      Head: Normocephalic and atraumatic. Right Ear: External ear normal.      Left Ear: External ear normal.      Nose: Nose normal.   Eyes:      General: No scleral icterus. Right eye: No discharge. Left eye: No discharge. Conjunctiva/sclera: Conjunctivae normal.   Cardiovascular:      Rate and Rhythm: Normal rate and regular rhythm. Heart sounds: Normal heart sounds. Pulmonary:      Effort: Pulmonary effort is normal. No respiratory distress. Breath sounds: Normal breath sounds. No wheezing or rales. Chest:      Chest wall: No tenderness. Abdominal:      General: Bowel sounds are normal. There is no distension. Palpations: Abdomen is soft. There is no mass. Tenderness: There is no abdominal tenderness. There is no guarding or rebound. Musculoskeletal:         General: No tenderness. Normal range of motion. Cervical back: Normal range of motion and neck supple. Skin:     General: Skin is warm and dry. Coloration: Skin is not pale. Findings: Wound present. No abscess, erythema or rash. Neurological:      Mental Status: He is alert and oriented to person, place, and time. Cranial Nerves: No cranial nerve deficit. Psychiatric:         Behavior: Behavior normal.         Thought Content:  Thought content normal.         Judgment: Judgment normal.     Lab Results   Component Value Date    WBC 14.2 (H) 12/27/2022    HGB 12.9 (L) 12/27/2022    HCT 39.6 (L) 12/27/2022    MCV 93.4 12/27/2022     12/27/2022     Lab Results   Component Value Date     12/27/2022    K 4.1 12/27/2022    CL 96 (L) 12/27/2022    CO2 28 12/27/2022     Lab Results   Component Value Date    CREATININE 0.5 12/27/2022     Lab Results   Component Value Date    ALT <5 (L) 09/15/2022    AST 23 09/15/2022    ALKPHOS 93 09/15/2022    BILITOT 0.8 09/15/2022     Lab Results   Component Value Date    LIPASE 28.2 04/17/2018       Patient Active Problem List   Diagnosis    Atrial fibrillation (HCC)    ASHD (arteriosclerotic heart disease)    Cholelithiasis    Gluten enteropathy    Pacemaker battery depletion    Complete heart block (HCC)    Benign non-nodular prostatic hyperplasia without lower urinary tract symptoms    Lumbar degenerative disc disease    Kidney stone on right side    Right inguinal hernia    Diverticulosis of large intestine without hemorrhage    Essential hypertension    Personal history of calcium pyrophosphate deposition disease (CPPD)    Localized osteoarthritis of right knee    Medtronic dual pacemaker      --35 minutes spent with patient today with direct patient care.    An electronic signature was used to authenticate this note.    --Maximino Vazquez MD

## 2023-03-17 ENCOUNTER — APPOINTMENT (OUTPATIENT)
Dept: GENERAL RADIOLOGY | Age: 83
End: 2023-03-17
Payer: MEDICARE

## 2023-03-17 ENCOUNTER — HOSPITAL ENCOUNTER (INPATIENT)
Age: 83
LOS: 14 days | Discharge: NURSING, REHAB OR LONG-TERM CARE | End: 2023-03-31
Attending: EMERGENCY MEDICINE
Payer: MEDICARE

## 2023-03-17 ENCOUNTER — APPOINTMENT (OUTPATIENT)
Dept: CT IMAGING | Age: 83
End: 2023-03-17
Payer: MEDICARE

## 2023-03-17 DIAGNOSIS — I50.9 ACUTE CONGESTIVE HEART FAILURE, UNSPECIFIED HEART FAILURE TYPE (HCC): ICD-10-CM

## 2023-03-17 DIAGNOSIS — I25.10 ASHD (ARTERIOSCLEROTIC HEART DISEASE): ICD-10-CM

## 2023-03-17 DIAGNOSIS — R77.8 ELEVATED TROPONIN: ICD-10-CM

## 2023-03-17 DIAGNOSIS — E83.42 HYPOMAGNESEMIA: ICD-10-CM

## 2023-03-17 DIAGNOSIS — R53.1 GENERALIZED WEAKNESS: Primary | ICD-10-CM

## 2023-03-17 DIAGNOSIS — E83.52 HYPERCALCEMIA: ICD-10-CM

## 2023-03-17 PROBLEM — J18.9 PNEUMONIA DUE TO INFECTIOUS ORGANISM, UNSPECIFIED LATERALITY, UNSPECIFIED PART OF LUNG: Status: ACTIVE | Noted: 2023-03-17

## 2023-03-17 LAB
ACINETOBACTER CALCOACETICUS-BAUMANNII BY PCR: NOT DETECTED
ALBUMIN SERPL BCG-MCNC: 3.7 G/DL (ref 3.5–5.1)
ALP SERPL-CCNC: 114 U/L (ref 38–126)
ALT SERPL W/O P-5'-P-CCNC: 6 U/L (ref 11–66)
AMYLASE SERPL-CCNC: 15 U/L (ref 20–104)
ANION GAP SERPL CALC-SCNC: 11 MEQ/L (ref 8–16)
AST SERPL-CCNC: 32 U/L (ref 5–40)
BACTERIA URNS QL MICRO: ABNORMAL /HPF
BASOPHILS ABSOLUTE: 0.1 THOU/MM3 (ref 0–0.1)
BASOPHILS NFR BLD AUTO: 0.4 %
BILIRUB CONJ SERPL-MCNC: 0.3 MG/DL (ref 0–0.3)
BILIRUB SERPL-MCNC: 0.9 MG/DL (ref 0.3–1.2)
BILIRUB UR QL STRIP.AUTO: NEGATIVE
BLACTX-M ISLT/SPM QL: NOT DETECTED
BLAIMP ISLT/SPM QL: NOT DETECTED
BLAKPC ISLT/SPM QL: NOT DETECTED
BLAOXA-48-LIKE ISLT/SPM QL: NOT DETECTED
BLAVIM ISLT/SPM QL: NOT DETECTED
BUN SERPL-MCNC: 19 MG/DL (ref 7–22)
C PNEUM DNA LOWER RESP QL NAA+NON-PROBE: NOT DETECTED
CA-I BLD ISE-SCNC: 1.48 MMOL/L (ref 1.12–1.32)
CALCIUM SERPL-MCNC: 12.1 MG/DL (ref 8.5–10.5)
CASTS #/AREA URNS LPF: ABNORMAL /LPF
CASTS 2: ABNORMAL /LPF
CHARACTER UR: ABNORMAL
CHLORIDE SERPL-SCNC: 97 MEQ/L (ref 98–111)
CO2 SERPL-SCNC: 23 MEQ/L (ref 23–33)
COLOR: YELLOW
CREAT SERPL-MCNC: 0.4 MG/DL (ref 0.4–1.2)
CRYSTALS URNS MICRO: ABNORMAL
D DIMER PPP IA.FEU-MCNC: 384 NG/ML FEU (ref 0–500)
DEPRECATED RDW RBC AUTO: 51.8 FL (ref 35–45)
ENTEROBACTER CLOACAE COMPLEX BY PCR: NOT DETECTED
EOSINOPHIL NFR BLD AUTO: 3.6 %
EOSINOPHILS ABSOLUTE: 0.5 THOU/MM3 (ref 0–0.4)
EPITHELIAL CELLS, UA: ABNORMAL /HPF
ERYTHROCYTE [DISTWIDTH] IN BLOOD BY AUTOMATED COUNT: 15 % (ref 11.5–14.5)
ESCHERICHIA COLI BY PCR: DETECTED
FLUAV RNA LOWER RESP QL NAA+NON-PROBE: NOT DETECTED
FLUAV RNA RESP QL NAA+PROBE: NOT DETECTED
FLUBV RNA LOWER RESP QL NAA+NON-PROBE: NOT DETECTED
FLUBV RNA RESP QL NAA+PROBE: NOT DETECTED
GFR SERPL CREATININE-BSD FRML MDRD: > 60 ML/MIN/1.73M2
GLUCOSE SERPL-MCNC: 123 MG/DL (ref 70–108)
GLUCOSE UR QL STRIP.AUTO: NEGATIVE MG/DL
HADV DNA LOWER RESP QL NAA+NON-PROBE: NOT DETECTED
HAEMOPHILUS INFLUENZAE BY PCR: NOT DETECTED
HCOV RNA LOWER RESP QL NAA+NON-PROBE: NOT DETECTED
HCT VFR BLD AUTO: 32.6 % (ref 42–52)
HEMOCCULT STL QL: NEGATIVE
HGB BLD-MCNC: 10.7 GM/DL (ref 14–18)
HGB UR QL STRIP.AUTO: NEGATIVE
HMPV RNA LOWER RESP QL NAA+NON-PROBE: NOT DETECTED
HPIV RNA LOWER RESP QL NAA+NON-PROBE: NOT DETECTED
IMM GRANULOCYTES # BLD AUTO: 0.05 THOU/MM3 (ref 0–0.07)
IMM GRANULOCYTES NFR BLD AUTO: 0.4 %
KETONES UR QL STRIP.AUTO: ABNORMAL
KLEBSIELLA AEROGENES BY PCR: NOT DETECTED
KLEBSIELLA OXYTOCA BY PCR: NOT DETECTED
KLEBSIELLA PNEUMONIAE GROUP BY PCR: NOT DETECTED
L PNEUMO DNA LOWER RESP QL NAA+NON-PROBE: NOT DETECTED
LACTATE SERPL-SCNC: 1.8 MMOL/L (ref 0.5–2)
LIPASE SERPL-CCNC: 18.3 U/L (ref 5.6–51.3)
LYMPHOCYTES ABSOLUTE: 0.8 THOU/MM3 (ref 1–4.8)
LYMPHOCYTES NFR BLD AUTO: 6.7 %
M PNEUMO DNA LOWER RESP QL NAA+NON-PROBE: NOT DETECTED
MAGNESIUM SERPL-MCNC: 1.2 MG/DL (ref 1.6–2.4)
MCH RBC QN AUTO: 30.7 PG (ref 26–33)
MCHC RBC AUTO-ENTMCNC: 32.8 GM/DL (ref 32.2–35.5)
MCV RBC AUTO: 93.4 FL (ref 80–94)
MISCELLANEOUS 2: ABNORMAL
MONOCYTES ABSOLUTE: 1.2 THOU/MM3 (ref 0.4–1.3)
MONOCYTES NFR BLD AUTO: 9.6 %
MORAXELLA CATARRHALIS BY PCR: DETECTED
NEUTROPHILS NFR BLD AUTO: 79.3 %
NITRITE UR QL STRIP: NEGATIVE
NRBC BLD AUTO-RTO: 0 /100 WBC
NT-PROBNP SERPL IA-MCNC: 3518 PG/ML (ref 0–449)
OSMOLALITY SERPL CALC.SUM OF ELEC: 266.3 MOSMOL/KG (ref 275–300)
PH UR STRIP.AUTO: 6.5 [PH] (ref 5–9)
PLATELET # BLD AUTO: 169 THOU/MM3 (ref 130–400)
PMV BLD AUTO: 10 FL (ref 9.4–12.4)
POTASSIUM SERPL-SCNC: 3.7 MEQ/L (ref 3.5–5.2)
PROCALCITONIN SERPL IA-MCNC: 0.25 NG/ML (ref 0.01–0.09)
PROT SERPL-MCNC: 6.1 G/DL (ref 6.1–8)
PROT UR STRIP.AUTO-MCNC: NEGATIVE MG/DL
PROTEUS SPECIES BY PCR: NOT DETECTED
PSEUDOMONAS AERUGINOSA BY PCR: NOT DETECTED
RBC # BLD AUTO: 3.49 MILL/MM3 (ref 4.7–6.1)
RBC URINE: ABNORMAL /HPF
RENAL EPI CELLS #/AREA URNS HPF: ABNORMAL /[HPF]
RESISTANT GENE MECA/C & MREJ BY PCR: NOT DETECTED
RESISTANT GENE NDM BY PCR: NOT DETECTED
RSV RNA LOWER RESP QL NAA+NON-PROBE: NOT DETECTED
RV+EV RNA LOWER RESP QL NAA+NON-PROBE: NOT DETECTED
SARS-COV-2 RNA RESP QL NAA+PROBE: NOT DETECTED
SEGMENTED NEUTROPHILS ABSOLUTE COUNT: 10 THOU/MM3 (ref 1.8–7.7)
SERRATIA MARCESCENS BY PCR: NOT DETECTED
SODIUM SERPL-SCNC: 131 MEQ/L (ref 135–145)
SOURCE: ABNORMAL
SP GR UR REFRACT.AUTO: 1.02 (ref 1–1.03)
SPECIMEN ACCEPTABILITY: ABNORMAL
STAPH AUREUS BY PCR: DETECTED
STREP AGALACTIAE BY PCR: NOT DETECTED
STREP PNEUMONIAE BY PCR: NOT DETECTED
STREP PYOGENES BY PCR: NOT DETECTED
TROPONIN T: 0.02 NG/ML
UROBILINOGEN, URINE: 1 EU/DL (ref 0–1)
WBC # BLD AUTO: 12.6 THOU/MM3 (ref 4.8–10.8)
WBC #/AREA URNS HPF: ABNORMAL /HPF
WBC #/AREA URNS HPF: NEGATIVE /[HPF]
YEAST LIKE FUNGI URNS QL MICRO: ABNORMAL

## 2023-03-17 PROCEDURE — 81001 URINALYSIS AUTO W/SCOPE: CPT

## 2023-03-17 PROCEDURE — 1200000000 HC SEMI PRIVATE

## 2023-03-17 PROCEDURE — 82150 ASSAY OF AMYLASE: CPT

## 2023-03-17 PROCEDURE — 82330 ASSAY OF CALCIUM: CPT

## 2023-03-17 PROCEDURE — 84145 PROCALCITONIN (PCT): CPT

## 2023-03-17 PROCEDURE — 80053 COMPREHEN METABOLIC PANEL: CPT

## 2023-03-17 PROCEDURE — 83735 ASSAY OF MAGNESIUM: CPT

## 2023-03-17 PROCEDURE — 85379 FIBRIN DEGRADATION QUANT: CPT

## 2023-03-17 PROCEDURE — 82272 OCCULT BLD FECES 1-3 TESTS: CPT

## 2023-03-17 PROCEDURE — 83880 ASSAY OF NATRIURETIC PEPTIDE: CPT

## 2023-03-17 PROCEDURE — 36415 COLL VENOUS BLD VENIPUNCTURE: CPT

## 2023-03-17 PROCEDURE — 99223 1ST HOSP IP/OBS HIGH 75: CPT | Performed by: HOSPITALIST

## 2023-03-17 PROCEDURE — 82248 BILIRUBIN DIRECT: CPT

## 2023-03-17 PROCEDURE — 2500000003 HC RX 250 WO HCPCS: Performed by: HOSPITALIST

## 2023-03-17 PROCEDURE — 83605 ASSAY OF LACTIC ACID: CPT

## 2023-03-17 PROCEDURE — 84484 ASSAY OF TROPONIN QUANT: CPT

## 2023-03-17 PROCEDURE — 99285 EMERGENCY DEPT VISIT HI MDM: CPT

## 2023-03-17 PROCEDURE — 2580000003 HC RX 258: Performed by: EMERGENCY MEDICINE

## 2023-03-17 PROCEDURE — 85025 COMPLETE CBC W/AUTO DIFF WBC: CPT

## 2023-03-17 PROCEDURE — 87798 DETECT AGENT NOS DNA AMP: CPT

## 2023-03-17 PROCEDURE — 87486 CHLMYD PNEUM DNA AMP PROBE: CPT

## 2023-03-17 PROCEDURE — 87150 DNA/RNA AMPLIFIED PROBE: CPT

## 2023-03-17 PROCEDURE — 93005 ELECTROCARDIOGRAM TRACING: CPT | Performed by: EMERGENCY MEDICINE

## 2023-03-17 PROCEDURE — 6360000002 HC RX W HCPCS: Performed by: EMERGENCY MEDICINE

## 2023-03-17 PROCEDURE — 87205 SMEAR GRAM STAIN: CPT

## 2023-03-17 PROCEDURE — 71046 X-RAY EXAM CHEST 2 VIEWS: CPT

## 2023-03-17 PROCEDURE — 87636 SARSCOV2 & INF A&B AMP PRB: CPT

## 2023-03-17 PROCEDURE — 87541 LEGION PNEUMO DNA AMP PROB: CPT

## 2023-03-17 PROCEDURE — 2580000003 HC RX 258: Performed by: HOSPITALIST

## 2023-03-17 PROCEDURE — 70450 CT HEAD/BRAIN W/O DYE: CPT

## 2023-03-17 PROCEDURE — 87631 RESP VIRUS 3-5 TARGETS: CPT

## 2023-03-17 PROCEDURE — 1200000003 HC TELEMETRY R&B

## 2023-03-17 PROCEDURE — 87070 CULTURE OTHR SPECIMN AEROBIC: CPT

## 2023-03-17 PROCEDURE — 6360000002 HC RX W HCPCS: Performed by: HOSPITALIST

## 2023-03-17 PROCEDURE — 87581 M.PNEUMON DNA AMP PROBE: CPT

## 2023-03-17 PROCEDURE — 83690 ASSAY OF LIPASE: CPT

## 2023-03-17 RX ORDER — MAGNESIUM SULFATE IN WATER 40 MG/ML
2000 INJECTION, SOLUTION INTRAVENOUS ONCE
Status: COMPLETED | OUTPATIENT
Start: 2023-03-17 | End: 2023-03-17

## 2023-03-17 RX ORDER — ONDANSETRON 4 MG/1
4 TABLET, ORALLY DISINTEGRATING ORAL EVERY 8 HOURS PRN
Status: DISCONTINUED | OUTPATIENT
Start: 2023-03-17 | End: 2023-03-31 | Stop reason: HOSPADM

## 2023-03-17 RX ORDER — ENOXAPARIN SODIUM 100 MG/ML
40 INJECTION SUBCUTANEOUS EVERY 24 HOURS
Status: DISCONTINUED | OUTPATIENT
Start: 2023-03-17 | End: 2023-03-17

## 2023-03-17 RX ORDER — BUMETANIDE 0.25 MG/ML
0.5 INJECTION INTRAMUSCULAR; INTRAVENOUS ONCE
Status: COMPLETED | OUTPATIENT
Start: 2023-03-17 | End: 2023-03-17

## 2023-03-17 RX ORDER — SODIUM CHLORIDE 0.9 % (FLUSH) 0.9 %
5-40 SYRINGE (ML) INJECTION EVERY 12 HOURS SCHEDULED
Status: DISCONTINUED | OUTPATIENT
Start: 2023-03-17 | End: 2023-03-31 | Stop reason: HOSPADM

## 2023-03-17 RX ORDER — SODIUM CHLORIDE 9 MG/ML
INJECTION, SOLUTION INTRAVENOUS PRN
Status: DISCONTINUED | OUTPATIENT
Start: 2023-03-17 | End: 2023-03-31 | Stop reason: HOSPADM

## 2023-03-17 RX ORDER — SODIUM CHLORIDE 0.9 % (FLUSH) 0.9 %
5-40 SYRINGE (ML) INJECTION PRN
Status: DISCONTINUED | OUTPATIENT
Start: 2023-03-17 | End: 2023-03-31 | Stop reason: HOSPADM

## 2023-03-17 RX ORDER — ONDANSETRON 2 MG/ML
4 INJECTION INTRAMUSCULAR; INTRAVENOUS EVERY 6 HOURS PRN
Status: DISCONTINUED | OUTPATIENT
Start: 2023-03-17 | End: 2023-03-31 | Stop reason: HOSPADM

## 2023-03-17 RX ORDER — ACETAMINOPHEN 650 MG/1
650 SUPPOSITORY RECTAL EVERY 6 HOURS PRN
Status: DISCONTINUED | OUTPATIENT
Start: 2023-03-17 | End: 2023-03-31 | Stop reason: HOSPADM

## 2023-03-17 RX ORDER — SODIUM CHLORIDE 9 MG/ML
INJECTION, SOLUTION INTRAVENOUS CONTINUOUS
Status: DISCONTINUED | OUTPATIENT
Start: 2023-03-17 | End: 2023-03-20

## 2023-03-17 RX ORDER — ACETAMINOPHEN 325 MG/1
650 TABLET ORAL EVERY 6 HOURS PRN
Status: DISCONTINUED | OUTPATIENT
Start: 2023-03-17 | End: 2023-03-31 | Stop reason: HOSPADM

## 2023-03-17 RX ADMIN — CEFTRIAXONE SODIUM 1000 MG: 1 INJECTION, POWDER, FOR SOLUTION INTRAMUSCULAR; INTRAVENOUS at 19:59

## 2023-03-17 RX ADMIN — MAGNESIUM SULFATE HEPTAHYDRATE 2000 MG: 40 INJECTION, SOLUTION INTRAVENOUS at 17:39

## 2023-03-17 RX ADMIN — SODIUM CHLORIDE: 9 INJECTION, SOLUTION INTRAVENOUS at 18:55

## 2023-03-17 RX ADMIN — AZITHROMYCIN DIHYDRATE 500 MG: 500 INJECTION, POWDER, LYOPHILIZED, FOR SOLUTION INTRAVENOUS at 22:30

## 2023-03-17 RX ADMIN — BUMETANIDE 0.5 MG: 0.25 INJECTION INTRAMUSCULAR; INTRAVENOUS at 18:55

## 2023-03-17 ASSESSMENT — LIFESTYLE VARIABLES
HOW OFTEN DO YOU HAVE A DRINK CONTAINING ALCOHOL: NEVER
HOW MANY STANDARD DRINKS CONTAINING ALCOHOL DO YOU HAVE ON A TYPICAL DAY: PATIENT DOES NOT DRINK

## 2023-03-17 ASSESSMENT — PAIN - FUNCTIONAL ASSESSMENT
PAIN_FUNCTIONAL_ASSESSMENT: NONE - DENIES PAIN

## 2023-03-17 NOTE — ED TRIAGE NOTES
Pt presents to the ED via EMS with c/o cough. EMS states they were called out for lift assist. Pt was getting up from the toilet when his legs became weak causing him to fall. Pt states he fell down onto his knees. Pt denies hitting his head. Pt states his PCP told him he needs PT. Once EMS arrived, pt wife decided she wanted pt checked out for his cough. Pt states he has had a cough for a week. Pt denies any pain.

## 2023-03-17 NOTE — ED NOTES
Report received from [place], [address] [place] [address]. This nurse assuming care.      Emilee Gibbons RN  03/17/23 1789

## 2023-03-17 NOTE — H&P
History & Physical    Patient:  Ria Mims  YOB: 1940  Date of Service: 3/17/2023  MRN: [id number]   Acct:  [de-identified]   Primary Care Physician: Poli Christensen MD    Chief Complaint: Weakness    History of Present Illness:   History obtained from the patient. The patient is a 79 y.o. male who presents with complaints of generalized weakness. Patient reports that he has not been feeling well for the last couple weeks. Patient reports that he has had a cough that was initially dry now productive sometimes of pink or clear sputum. He is on Eliquis for blood thinners. Family is also at bedside endorse that he has had a poor appetite ever since he was treated for Shigella. Patient denies any fevers or chills, nausea or vomiting. Denies any abdominal pain or diarrhea. Patient reports that today he was unable to lift himself off the toilet, EMS was called today for lift assistance and then family decided they would like him evaluated. Patient denies any chest pain, shortness of breath. Does report that he has significant lower extremity swelling from his knees downwards. He follows closely with cardiology and was take off of Norvasc however has had no improvement .      Past Medical History:        Diagnosis Date    Arthritis     Atrial fibrillation [facility])     Atrial fibrillation ([facility] [address] 07/08/2020    CAD (coronary artery disease)     CHF (congestive heart failure) ([facility])     Dyslipidemia 06/23/2021    Enlarged prostate 06/23/2021    GERD (gastroesophageal reflux disease)     Hyperlipidemia     Hypertension     Medtronic dual pacemaker  11/8/2022       Past Surgical History:        Procedure Laterality Date    BUNIONECTOMY Left 1990's    [facility] Left 2015    Dr. Anjali Posey    COLONOSCOPY      x2 Dr. Mirna Lugo  2016    Dr. Ginger Abraham Right 1960's    inguinal    Junito Luna  2008    Dr. Chavez Mccormick in the cavernous segments of both internal carotid arteries. There is no hemorrhage. There are no intra-or extra-axial collections. There is no hydrocephalus, midline shift or mass effect. The paranasal sinuses and mastoid air cells are normally aerated. There is no suspicious calvarial abnormality. 1. Stable CT scan of the brain, no interval change since previous study dated 24th of November 2008. 2. Diffusion MRI scan of the brain may be helpful for better evaluation if the patient has persistent symptoms. **This report has been created using voice recognition software. It may contain minor errors which are inherent in voice recognition technology. ** Final report electronically signed by DR Jarrett Sargent on 3/17/2023 3:29 PM         EKG: ventricular paced      Principal Problem:    Pneumonia due to infectious organism, unspecified laterality, unspecified part of lung  Resolved Problems:    * No resolved hospital problems. *      Assessment and Plan:    Community-acquired pneumonia: Retrocardiac infiltrates noted on imaging, patient with productive cough. We will start patient Rocephin and azithromycin. As needed bronchodilators. COVID/flu negative. Check pneumonia panel. Elevated troponin: We will trend troponins, obtain limited 2D echo. Cardiology consulted. Patient is currently asymptomatic with no chest pain complaints of generalized weakness. EKG shows paced rhythm  Moderate aortic stenosis: Limited echo ordered  Peripheral edema: Patient has 3+ pitting edema of the lower extremities, reports it has been ongoing for the last few weeks thought initially was due to amlodipine. Elevated BNP as well. Limited echo pending. We will give a small dose of IV Bumex. Cardiology to see. Atrial fibrillation: Status post pacemaker, continue with Eliquis. History of CAD: Patient has a history of 80% stenosis of LAD and 60% stenosis of left circumference being managed medically.   Most recent echo shows

## 2023-03-17 NOTE — ED PROVIDER NOTES
[facility] [address] [address] EMERGENCY DEPT      EMERGENCY MEDICINE     Room # 23/023A    Pt Name: Marli Peguero  MRN: [id number]  [place] 1940  Date of evaluation: 3/17/2023  Provider: Michael Atkins MD    CHIEF COMPLAINT       Chief Complaint   Patient presents with    Cough    Extremity Weakness     legs     HISTORY OF PRESENT ILLNESS   Marli Peguero is a pleasant 79 y.o. male who presents to the emergency department from from home, as a walk in to the ED lobby for evaluation of cough and generalized weakness. The patient says been coughing for a week mostly dry however lately noted some red-pink sputum. The patient today went woke up with generalized weakness, she cannot get out feels like his legs will get a give out. Denies any fever chills no shortness of breath or chest pain no headache neck pain. The patient has been taking Pepto-Bismol and having black stool. Patient had a pacemaker denies any stroke or artery disease however she has been losing weight about more than 10 pounds as she does have any appetite since she has a Shigella diarrhea in the fall 2022. Patient denies any abdominal pain, stool is soft.     PASTMEDICAL HISTORY     Past Medical History:   Diagnosis Date    Arthritis     Atrial fibrillation [facility])     [facility] 07/08/2020    CAD (coronary artery disease)     CHF (congestive heart failure) ([facility]     Dyslipidemia 06/23/2021    Enlarged prostate 06/23/2021    GERD (gastroesophageal reflux disease)     Hyperlipidemia     Hypertension     Medtronic dual pacemaker  11/8/2022       Patient Active Problem List   Diagnosis Code    Atrial fibrillation (HCC) I48.91    ASHD (arteriosclerotic heart disease) I25.10    Cholelithiasis K80.20    Gluten enteropathy K90.41    Pacemaker battery depletion Z45.010    Complete heart block (HCC) I44.2    Benign non-nodular prostatic hyperplasia without lower urinary tract symptoms N40.0    Lumbar degenerative disc disease M51.36    Kidney

## 2023-03-17 NOTE — ED NOTES
Patient resting in bed. Respirations easy and unlabored. No distress noted. Call light within reach.      Wellington Shaw RN  03/17/23 0530

## 2023-03-18 ENCOUNTER — APPOINTMENT (OUTPATIENT)
Dept: INTERVENTIONAL RADIOLOGY/VASCULAR | Age: 83
End: 2023-03-18
Payer: MEDICARE

## 2023-03-18 LAB
ANION GAP SERPL CALC-SCNC: 12 MEQ/L (ref 8–16)
BASOPHILS ABSOLUTE: 0.1 THOU/MM3 (ref 0–0.1)
BASOPHILS NFR BLD AUTO: 0.4 %
BUN SERPL-MCNC: 15 MG/DL (ref 7–22)
CALCIUM SERPL-MCNC: 11.5 MG/DL (ref 8.5–10.5)
CHLORIDE SERPL-SCNC: 100 MEQ/L (ref 98–111)
CO2 SERPL-SCNC: 22 MEQ/L (ref 23–33)
CREAT SERPL-MCNC: 0.3 MG/DL (ref 0.4–1.2)
DEPRECATED RDW RBC AUTO: 52.2 FL (ref 35–45)
EKG ATRIAL RATE: 214 BPM
EKG Q-T INTERVAL: 492 MS
EKG QRS DURATION: 210 MS
EKG QTC CALCULATION (BAZETT): 492 MS
EKG R AXIS: -75 DEGREES
EKG T AXIS: 93 DEGREES
EKG VENTRICULAR RATE: 60 BPM
EOSINOPHIL NFR BLD AUTO: 2.6 %
EOSINOPHILS ABSOLUTE: 0.3 THOU/MM3 (ref 0–0.4)
ERYTHROCYTE [DISTWIDTH] IN BLOOD BY AUTOMATED COUNT: 15 % (ref 11.5–14.5)
GFR SERPL CREATININE-BSD FRML MDRD: > 60 ML/MIN/1.73M2
GLUCOSE SERPL-MCNC: 88 MG/DL (ref 70–108)
HCT VFR BLD AUTO: 32.3 % (ref 42–52)
HGB BLD-MCNC: 10.5 GM/DL (ref 14–18)
IMM GRANULOCYTES # BLD AUTO: 0.06 THOU/MM3 (ref 0–0.07)
IMM GRANULOCYTES NFR BLD AUTO: 0.5 %
LV EF: 53 %
LVEF MODALITY: NORMAL
LYMPHOCYTES ABSOLUTE: 0.9 THOU/MM3 (ref 1–4.8)
LYMPHOCYTES NFR BLD AUTO: 6.7 %
MAGNESIUM SERPL-MCNC: 1.3 MG/DL (ref 1.6–2.4)
MCH RBC QN AUTO: 30.9 PG (ref 26–33)
MCHC RBC AUTO-ENTMCNC: 32.5 GM/DL (ref 32.2–35.5)
MCV RBC AUTO: 95 FL (ref 80–94)
MONOCYTES ABSOLUTE: 1.4 THOU/MM3 (ref 0.4–1.3)
MONOCYTES NFR BLD AUTO: 10.2 %
NEUTROPHILS NFR BLD AUTO: 79.6 %
NRBC BLD AUTO-RTO: 0 /100 WBC
PLATELET # BLD AUTO: 174 THOU/MM3 (ref 130–400)
PMV BLD AUTO: 9.7 FL (ref 9.4–12.4)
POTASSIUM SERPL-SCNC: 2.9 MEQ/L (ref 3.5–5.2)
POTASSIUM SERPL-SCNC: 4 MEQ/L (ref 3.5–5.2)
RBC # BLD AUTO: 3.4 MILL/MM3 (ref 4.7–6.1)
SEGMENTED NEUTROPHILS ABSOLUTE COUNT: 10.6 THOU/MM3 (ref 1.8–7.7)
SODIUM SERPL-SCNC: 134 MEQ/L (ref 135–145)
TROPONIN T: 0.01 NG/ML
TROPONIN T: < 0.01 NG/ML
WBC # BLD AUTO: 13.3 THOU/MM3 (ref 4.8–10.8)

## 2023-03-18 PROCEDURE — 2500000003 HC RX 250 WO HCPCS: Performed by: HOSPITALIST

## 2023-03-18 PROCEDURE — 6360000002 HC RX W HCPCS: Performed by: HOSPITALIST

## 2023-03-18 PROCEDURE — 2580000003 HC RX 258: Performed by: HOSPITALIST

## 2023-03-18 PROCEDURE — 84132 ASSAY OF SERUM POTASSIUM: CPT

## 2023-03-18 PROCEDURE — 6370000000 HC RX 637 (ALT 250 FOR IP): Performed by: HOSPITALIST

## 2023-03-18 PROCEDURE — 85025 COMPLETE CBC W/AUTO DIFF WBC: CPT

## 2023-03-18 PROCEDURE — 80048 BASIC METABOLIC PNL TOTAL CA: CPT

## 2023-03-18 PROCEDURE — 99233 SBSQ HOSP IP/OBS HIGH 50: CPT | Performed by: HOSPITALIST

## 2023-03-18 PROCEDURE — 1200000003 HC TELEMETRY R&B

## 2023-03-18 PROCEDURE — 99223 1ST HOSP IP/OBS HIGH 75: CPT | Performed by: INTERNAL MEDICINE

## 2023-03-18 PROCEDURE — 36415 COLL VENOUS BLD VENIPUNCTURE: CPT

## 2023-03-18 PROCEDURE — 93306 TTE W/DOPPLER COMPLETE: CPT

## 2023-03-18 PROCEDURE — 93010 ELECTROCARDIOGRAM REPORT: CPT | Performed by: INTERNAL MEDICINE

## 2023-03-18 PROCEDURE — 83735 ASSAY OF MAGNESIUM: CPT

## 2023-03-18 PROCEDURE — 1200000000 HC SEMI PRIVATE

## 2023-03-18 PROCEDURE — 84484 ASSAY OF TROPONIN QUANT: CPT

## 2023-03-18 PROCEDURE — 93970 EXTREMITY STUDY: CPT

## 2023-03-18 PROCEDURE — 93005 ELECTROCARDIOGRAM TRACING: CPT | Performed by: HOSPITALIST

## 2023-03-18 RX ORDER — BACLOFEN 10 MG/1
10 TABLET ORAL 3 TIMES DAILY PRN
Status: DISCONTINUED | OUTPATIENT
Start: 2023-03-18 | End: 2023-03-31 | Stop reason: HOSPADM

## 2023-03-18 RX ORDER — CARVEDILOL 25 MG/1
25 TABLET ORAL 2 TIMES DAILY WITH MEALS
Status: DISCONTINUED | OUTPATIENT
Start: 2023-03-18 | End: 2023-03-31 | Stop reason: HOSPADM

## 2023-03-18 RX ORDER — POTASSIUM CHLORIDE 7.45 MG/ML
10 INJECTION INTRAVENOUS PRN
Status: DISCONTINUED | OUTPATIENT
Start: 2023-03-18 | End: 2023-03-31 | Stop reason: HOSPADM

## 2023-03-18 RX ORDER — MESALAMINE 400 MG/1
800 CAPSULE, DELAYED RELEASE ORAL 2 TIMES DAILY
Status: DISCONTINUED | OUTPATIENT
Start: 2023-03-18 | End: 2023-03-31 | Stop reason: HOSPADM

## 2023-03-18 RX ORDER — BUMETANIDE 0.25 MG/ML
1 INJECTION INTRAMUSCULAR; INTRAVENOUS DAILY
Status: DISCONTINUED | OUTPATIENT
Start: 2023-03-18 | End: 2023-03-20

## 2023-03-18 RX ORDER — PANTOPRAZOLE SODIUM 40 MG/1
40 TABLET, DELAYED RELEASE ORAL DAILY
Status: DISCONTINUED | OUTPATIENT
Start: 2023-03-18 | End: 2023-03-31 | Stop reason: HOSPADM

## 2023-03-18 RX ORDER — POTASSIUM CHLORIDE 20 MEQ/1
40 TABLET, EXTENDED RELEASE ORAL ONCE
Status: COMPLETED | OUTPATIENT
Start: 2023-03-18 | End: 2023-03-18

## 2023-03-18 RX ORDER — FINASTERIDE 5 MG/1
5 TABLET, FILM COATED ORAL DAILY
Status: DISCONTINUED | OUTPATIENT
Start: 2023-03-18 | End: 2023-03-31 | Stop reason: HOSPADM

## 2023-03-18 RX ORDER — AMPICILLIN TRIHYDRATE 250 MG
200 CAPSULE ORAL 2 TIMES DAILY
Status: DISCONTINUED | OUTPATIENT
Start: 2023-03-18 | End: 2023-03-18 | Stop reason: RX

## 2023-03-18 RX ORDER — CAPTOPRIL 12.5 MG/1
12.5 TABLET ORAL 2 TIMES DAILY
Status: DISCONTINUED | OUTPATIENT
Start: 2023-03-18 | End: 2023-03-18

## 2023-03-18 RX ORDER — ROSUVASTATIN CALCIUM 10 MG/1
5 TABLET, COATED ORAL DAILY
Status: DISCONTINUED | OUTPATIENT
Start: 2023-03-18 | End: 2023-03-31 | Stop reason: HOSPADM

## 2023-03-18 RX ORDER — MAGNESIUM SULFATE IN WATER 40 MG/ML
2000 INJECTION, SOLUTION INTRAVENOUS PRN
Status: DISCONTINUED | OUTPATIENT
Start: 2023-03-18 | End: 2023-03-31 | Stop reason: HOSPADM

## 2023-03-18 RX ORDER — GEMFIBROZIL 600 MG/1
600 TABLET, FILM COATED ORAL
Status: DISCONTINUED | OUTPATIENT
Start: 2023-03-18 | End: 2023-03-31 | Stop reason: HOSPADM

## 2023-03-18 RX ORDER — POTASSIUM CHLORIDE 20 MEQ/1
40 TABLET, EXTENDED RELEASE ORAL PRN
Status: DISCONTINUED | OUTPATIENT
Start: 2023-03-18 | End: 2023-03-31 | Stop reason: HOSPADM

## 2023-03-18 RX ADMIN — AZITHROMYCIN DIHYDRATE 500 MG: 500 INJECTION, POWDER, LYOPHILIZED, FOR SOLUTION INTRAVENOUS at 21:20

## 2023-03-18 RX ADMIN — ROSUVASTATIN 5 MG: 10 TABLET, FILM COATED ORAL at 09:15

## 2023-03-18 RX ADMIN — GEMFIBROZIL 600 MG: 600 TABLET ORAL at 06:29

## 2023-03-18 RX ADMIN — FINASTERIDE 5 MG: 5 TABLET, FILM COATED ORAL at 09:15

## 2023-03-18 RX ADMIN — PANTOPRAZOLE SODIUM 40 MG: 40 TABLET, DELAYED RELEASE ORAL at 06:29

## 2023-03-18 RX ADMIN — CEFTRIAXONE SODIUM 1000 MG: 1 INJECTION, POWDER, FOR SOLUTION INTRAMUSCULAR; INTRAVENOUS at 20:44

## 2023-03-18 RX ADMIN — BUMETANIDE 1 MG: 0.25 INJECTION INTRAMUSCULAR; INTRAVENOUS at 13:18

## 2023-03-18 RX ADMIN — MESALAMINE 800 MG: 400 CAPSULE, DELAYED RELEASE ORAL at 09:15

## 2023-03-18 RX ADMIN — POTASSIUM CHLORIDE 10 MEQ: 7.46 INJECTION, SOLUTION INTRAVENOUS at 16:31

## 2023-03-18 RX ADMIN — POTASSIUM CHLORIDE 10 MEQ: 7.46 INJECTION, SOLUTION INTRAVENOUS at 15:24

## 2023-03-18 RX ADMIN — SODIUM CHLORIDE: 9 INJECTION, SOLUTION INTRAVENOUS at 09:56

## 2023-03-18 RX ADMIN — POTASSIUM CHLORIDE 10 MEQ: 7.46 INJECTION, SOLUTION INTRAVENOUS at 18:33

## 2023-03-18 RX ADMIN — POTASSIUM CHLORIDE 40 MEQ: 1500 TABLET, EXTENDED RELEASE ORAL at 14:28

## 2023-03-18 RX ADMIN — GEMFIBROZIL 600 MG: 600 TABLET ORAL at 16:27

## 2023-03-18 RX ADMIN — SODIUM CHLORIDE: 9 INJECTION, SOLUTION INTRAVENOUS at 20:42

## 2023-03-18 RX ADMIN — MESALAMINE 800 MG: 400 CAPSULE, DELAYED RELEASE ORAL at 20:45

## 2023-03-18 RX ADMIN — APIXABAN 5 MG: 5 TABLET, FILM COATED ORAL at 09:15

## 2023-03-18 RX ADMIN — POTASSIUM CHLORIDE 10 MEQ: 7.46 INJECTION, SOLUTION INTRAVENOUS at 17:36

## 2023-03-18 RX ADMIN — MAGNESIUM SULFATE HEPTAHYDRATE 2000 MG: 40 INJECTION, SOLUTION INTRAVENOUS at 16:30

## 2023-03-18 RX ADMIN — POTASSIUM CHLORIDE 10 MEQ: 7.46 INJECTION, SOLUTION INTRAVENOUS at 14:24

## 2023-03-18 RX ADMIN — POTASSIUM CHLORIDE 10 MEQ: 7.46 INJECTION, SOLUTION INTRAVENOUS at 19:40

## 2023-03-18 RX ADMIN — CARVEDILOL 25 MG: 25 TABLET, FILM COATED ORAL at 16:27

## 2023-03-18 NOTE — CONSULTS
[facility]  Consult    Patient's Name/Date of Birth: Amaya Ford / 1940 (80 y.o.)    Date: March 18, 2023     Referring Provider: Dora Prieto MD    CHIEF COMPLAINT:  SOB      HPI: This is a pleasant 79 y.o. male hx of Afib, CAD, HTN, HLD, hx of mix LCX 60% and ostial 80% LAD told to continue medical therapy. He has moderate AS. Wife and daughter present. Has been fatigued, sob, and developing worsening LE edema. He has been taking his meds. He is on Eliquis, no bleeding. He has no passed out, but does get lightheaded. Has lost 30 lbs as he is not hungry. No chest pain or angina. He was stopped off Digoxin. He is not on GDMT but was being actively managed for it. Cr 0.3, Trop 0.011, K 2.9, CO2 22, Phos 2.1. Hgb 10.5. ECG is V-paced rhythm. Wife states he has no energy, and daughter confirms. Worse over the last couple months. Cannot do ADLs without getting sob. No know malignancies. 3/2022 Cath (reviewed):  IMPRESSION:  1. Dominant RCA which was patent. 2.  Patent left main. 3.  About 60% narrowing of the mid circumflex after the origin of the  obtuse marginal.  4.  About 80% narrowing of the ostium of the LAD. 5.  Left ventriculogram was performed due to the difficulty passing the  pigtail catheter through the subclavian artery which is very tortuous. The patient tolerated the procedure well. Echo: No results found for this or any previous visit.        All labs, EKG's, diagnostic testing and images as well as cardiac cath, stress testing were reviewed during this encounter    Past Medical History:   Diagnosis Date    Arthritis     Atrial fibrillation [facility])     Atrial fibrillation ([facility] [address] 07/08/2020    CAD (coronary artery disease)     CHF (congestive heart failure) ([facility] [address]     Dyslipidemia 06/23/2021    Enlarged prostate 06/23/2021    GERD (gastroesophageal reflux disease)     Hyperlipidemia     Hypertension     Medtronic dual pacemaker PROTIME, INR in the last 72 hours. APTT: No results for input(s): APTT in the last 72 hours.   Liver Profile:  Lab Results   Component Value Date/Time    AST 32 03/17/2023 02:36 PM    ALT 6 03/17/2023 02:36 PM    BILIDIR 0.3 03/17/2023 02:36 PM    BILITOT 0.9 03/17/2023 02:36 PM    ALKPHOS 114 03/17/2023 02:36 PM     Lab Results   Component Value Date/Time    CHOL 124 09/15/2022 07:26 AM    HDL 49 09/15/2022 07:26 AM    TRIG 86 09/15/2022 07:26 AM     TSH: No results found for: TSH  UA:   Lab Results   Component Value Date/Time    NITRITE negative 04/13/2021 10:02 AM    COLORU YELLOW 03/17/2023 06:47 PM    PHUR 6.5 03/17/2023 06:47 PM    WBCUA 0-2 03/17/2023 06:47 PM    RBCUA 3-5 03/17/2023 06:47 PM    YEAST NONE SEEN 03/17/2023 06:47 PM    BACTERIA NONE SEEN 03/17/2023 06:47 PM    CLARITYU clear 04/13/2021 10:02 AM    SPECGRAV 1.010 04/13/2021 10:02 AM    LEUKOCYTESUR NEGATIVE 03/17/2023 06:47 PM    UROBILINOGEN 1.0 03/17/2023 06:47 PM    BILIRUBINUR NEGATIVE 03/17/2023 06:47 PM    BILIRUBINUR negative 04/13/2021 10:02 AM    BLOODU NEGATIVE 03/17/2023 06:47 PM    GLUCOSEU NEGATIVE 03/17/2023 06:47 PM         Physical Exam:  Vitals:    03/18/23 0915   BP: 116/62   Pulse:    Resp:    Temp:    SpO2:       Intake/Output Summary (Last 24 hours) at 3/18/2023 1137  Last data filed at 3/17/2023 2206  Gross per 24 hour   Intake --   Output 300 ml   Net -300 ml      General:  No acute distress, thin, fatigued  Neck: Supple, no JVD, no carotid bruits  Heart: Regular rhythm normal S1 and S2, no rubs, 3/6 ROBSON at the 2nd DESIRE and apex  Lungs: oift bilateral crackles  Abdomen: positive bowel sounds, soft, non-tender, non-distended, no bruits, no masses  Extremities:no clubbing, cyanosis, 4+ edema  Neurologic: alert and oriented x 3, cranial nerves 2-12 grossly intact, motor and sensory intact, moving all extremities  Skin: No rashes  Psych: AO x 3, no depression/cuauhtemoc, no pressured speech, normal affect  Lymph: No obvious

## 2023-03-18 NOTE — PROGRESS NOTES
1 mg IntraVENous Daily    sodium chloride flush  5-40 mL IntraVENous 2 times per day    cefTRIAXone (ROCEPHIN) IV  1,000 mg IntraVENous Q24H    azithromycin  500 mg IntraVENous Q24H     PRN Meds: baclofen, sodium chloride flush, sodium chloride, ondansetron **OR** ondansetron, acetaminophen **OR** acetaminophen      Intake/Output Summary (Last 24 hours) at 3/18/2023 1359  Last data filed at 3/17/2023 2206  Gross per 24 hour   Intake --   Output 300 ml   Net -300 ml       Diet:  ADULT DIET; Regular; Low Fat/Low Chol/High Fiber/TRAVIS; Gluten free diet    Exam:  /62   Pulse 60   Temp 98.3 °F (36.8 °C) (Oral)   Resp 18   Ht 6' 1\" (1.854 m)   Wt 160 lb (72.6 kg)   SpO2 97%   BMI 21.11 kg/m²     General appearance: No apparent distress, appears stated age and cooperative. Respiratory:  Normal respiratory effort. Clear to auscultation, bilaterally without Rales/Wheezes/Rhonchi. Cardiovascular: Regular rate and rhythm with normal S1/S2 without murmurs, rubs or gallops. Abdomen: Soft, non-tender, non-distended with normal bowel sounds. Extremities: 3+ pitting edema  Skin:  no rashes    Labs:   Recent Labs     03/17/23  1436 03/18/23  0623   WBC 12.6* 13.3*   HGB 10.7* 10.5*   HCT 32.6* 32.3*    174     Recent Labs     03/17/23  1436 03/18/23  0623   * 134*   K 3.7 2.9*   CL 97* 100   CO2 23 22*   BUN 19 15   CREATININE 0.4 0.3*   CALCIUM 12.1* 11.5*     Recent Labs     03/17/23  1436   AST 32   ALT 6*   BILIDIR 0.3   BILITOT 0.9   ALKPHOS 114     No results for input(s): INR in the last 72 hours. No results for input(s): Macarena Dross in the last 72 hours.   Recent Labs     03/17/23  1436   PROCAL 0.25*       Microbiology:      Urinalysis:      Lab Results   Component Value Date/Time    NITRU NEGATIVE 03/17/2023 06:47 PM    WBCUA 0-2 03/17/2023 06:47 PM    BACTERIA NONE SEEN 03/17/2023 06:47 PM    RBCUA 3-5 03/17/2023 06:47 PM    BLOODU NEGATIVE 03/17/2023 06:47 PM    SPECGRAV 1.010

## 2023-03-18 NOTE — PLAN OF CARE
Problem: Safety - Adult  Goal: Free from fall injury  Outcome: Progressing     Problem: ABCDS Injury Assessment  Goal: Absence of physical injury  Outcome: Progressing   Care plan reviewed with patient. Patient verbalizes understanding of the plan of care and contributes to goal setting.

## 2023-03-19 PROBLEM — R53.1 GENERALIZED WEAKNESS: Status: ACTIVE | Noted: 2023-03-19

## 2023-03-19 LAB
ANION GAP SERPL CALC-SCNC: 11 MEQ/L (ref 8–16)
BACTERIA SPEC RESP CULT: NORMAL
BASOPHILS ABSOLUTE: 0.1 THOU/MM3 (ref 0–0.1)
BASOPHILS NFR BLD AUTO: 0.5 %
BUN SERPL-MCNC: 14 MG/DL (ref 7–22)
CALCIUM SERPL-MCNC: 10.9 MG/DL (ref 8.5–10.5)
CHLORIDE SERPL-SCNC: 104 MEQ/L (ref 98–111)
CO2 SERPL-SCNC: 20 MEQ/L (ref 23–33)
CREAT SERPL-MCNC: 0.4 MG/DL (ref 0.4–1.2)
DEPRECATED RDW RBC AUTO: 54 FL (ref 35–45)
EKG ATRIAL RATE: 61 BPM
EKG Q-T INTERVAL: 522 MS
EKG QRS DURATION: 224 MS
EKG QTC CALCULATION (BAZETT): 522 MS
EKG R AXIS: -80 DEGREES
EKG T AXIS: 93 DEGREES
EKG VENTRICULAR RATE: 60 BPM
EOSINOPHIL NFR BLD AUTO: 4.3 %
EOSINOPHILS ABSOLUTE: 0.6 THOU/MM3 (ref 0–0.4)
ERYTHROCYTE [DISTWIDTH] IN BLOOD BY AUTOMATED COUNT: 15 % (ref 11.5–14.5)
GFR SERPL CREATININE-BSD FRML MDRD: > 60 ML/MIN/1.73M2
GLUCOSE SERPL-MCNC: 85 MG/DL (ref 70–108)
GRAM STN SPEC: NORMAL
HCT VFR BLD AUTO: 32.1 % (ref 42–52)
HGB BLD-MCNC: 10.1 GM/DL (ref 14–18)
IMM GRANULOCYTES # BLD AUTO: 0.05 THOU/MM3 (ref 0–0.07)
IMM GRANULOCYTES NFR BLD AUTO: 0.4 %
LYMPHOCYTES ABSOLUTE: 1.1 THOU/MM3 (ref 1–4.8)
LYMPHOCYTES NFR BLD AUTO: 8.3 %
MAGNESIUM SERPL-MCNC: 1.5 MG/DL (ref 1.6–2.4)
MCH RBC QN AUTO: 30.7 PG (ref 26–33)
MCHC RBC AUTO-ENTMCNC: 31.5 GM/DL (ref 32.2–35.5)
MCV RBC AUTO: 97.6 FL (ref 80–94)
MONOCYTES ABSOLUTE: 1.2 THOU/MM3 (ref 0.4–1.3)
MONOCYTES NFR BLD AUTO: 9.2 %
NEUTROPHILS NFR BLD AUTO: 77.3 %
NRBC BLD AUTO-RTO: 0 /100 WBC
PLATELET # BLD AUTO: 171 THOU/MM3 (ref 130–400)
PMV BLD AUTO: 9.8 FL (ref 9.4–12.4)
POTASSIUM SERPL-SCNC: 3.5 MEQ/L (ref 3.5–5.2)
RBC # BLD AUTO: 3.29 MILL/MM3 (ref 4.7–6.1)
SEGMENTED NEUTROPHILS ABSOLUTE COUNT: 10.1 THOU/MM3 (ref 1.8–7.7)
SODIUM SERPL-SCNC: 135 MEQ/L (ref 135–145)
WBC # BLD AUTO: 13.1 THOU/MM3 (ref 4.8–10.8)

## 2023-03-19 PROCEDURE — 99232 SBSQ HOSP IP/OBS MODERATE 35: CPT | Performed by: NURSE PRACTITIONER

## 2023-03-19 PROCEDURE — 6360000002 HC RX W HCPCS: Performed by: HOSPITALIST

## 2023-03-19 PROCEDURE — 36415 COLL VENOUS BLD VENIPUNCTURE: CPT

## 2023-03-19 PROCEDURE — 6370000000 HC RX 637 (ALT 250 FOR IP): Performed by: HOSPITALIST

## 2023-03-19 PROCEDURE — 85025 COMPLETE CBC W/AUTO DIFF WBC: CPT

## 2023-03-19 PROCEDURE — 93010 ELECTROCARDIOGRAM REPORT: CPT | Performed by: INTERNAL MEDICINE

## 2023-03-19 PROCEDURE — 2580000003 HC RX 258: Performed by: HOSPITALIST

## 2023-03-19 PROCEDURE — 80048 BASIC METABOLIC PNL TOTAL CA: CPT

## 2023-03-19 PROCEDURE — 1200000000 HC SEMI PRIVATE

## 2023-03-19 PROCEDURE — 2500000003 HC RX 250 WO HCPCS: Performed by: HOSPITALIST

## 2023-03-19 PROCEDURE — 83735 ASSAY OF MAGNESIUM: CPT

## 2023-03-19 PROCEDURE — 99232 SBSQ HOSP IP/OBS MODERATE 35: CPT | Performed by: HOSPITALIST

## 2023-03-19 RX ORDER — LACTOBACILLUS RHAMNOSUS GG 10B CELL
1 CAPSULE ORAL 2 TIMES DAILY WITH MEALS
Status: DISCONTINUED | OUTPATIENT
Start: 2023-03-19 | End: 2023-03-31 | Stop reason: HOSPADM

## 2023-03-19 RX ADMIN — MESALAMINE 800 MG: 400 CAPSULE, DELAYED RELEASE ORAL at 08:22

## 2023-03-19 RX ADMIN — GEMFIBROZIL 600 MG: 600 TABLET ORAL at 06:16

## 2023-03-19 RX ADMIN — AZITHROMYCIN DIHYDRATE 500 MG: 500 INJECTION, POWDER, LYOPHILIZED, FOR SOLUTION INTRAVENOUS at 20:59

## 2023-03-19 RX ADMIN — SODIUM CHLORIDE, PRESERVATIVE FREE 10 ML: 5 INJECTION INTRAVENOUS at 08:23

## 2023-03-19 RX ADMIN — CARVEDILOL 25 MG: 25 TABLET, FILM COATED ORAL at 08:17

## 2023-03-19 RX ADMIN — SODIUM CHLORIDE: 9 INJECTION, SOLUTION INTRAVENOUS at 23:09

## 2023-03-19 RX ADMIN — CEFTRIAXONE SODIUM 1000 MG: 1 INJECTION, POWDER, FOR SOLUTION INTRAMUSCULAR; INTRAVENOUS at 20:40

## 2023-03-19 RX ADMIN — MESALAMINE 800 MG: 400 CAPSULE, DELAYED RELEASE ORAL at 20:53

## 2023-03-19 RX ADMIN — ROSUVASTATIN 5 MG: 10 TABLET, FILM COATED ORAL at 08:25

## 2023-03-19 RX ADMIN — SODIUM CHLORIDE, PRESERVATIVE FREE 10 ML: 5 INJECTION INTRAVENOUS at 20:54

## 2023-03-19 RX ADMIN — ONDANSETRON 4 MG: 4 TABLET, ORALLY DISINTEGRATING ORAL at 13:11

## 2023-03-19 RX ADMIN — BUMETANIDE 1 MG: 0.25 INJECTION INTRAMUSCULAR; INTRAVENOUS at 09:50

## 2023-03-19 RX ADMIN — Medication 1 CAPSULE: at 16:35

## 2023-03-19 RX ADMIN — GEMFIBROZIL 600 MG: 600 TABLET ORAL at 15:18

## 2023-03-19 RX ADMIN — CARVEDILOL 25 MG: 25 TABLET, FILM COATED ORAL at 16:36

## 2023-03-19 RX ADMIN — PANTOPRAZOLE SODIUM 40 MG: 40 TABLET, DELAYED RELEASE ORAL at 06:16

## 2023-03-19 RX ADMIN — FINASTERIDE 5 MG: 5 TABLET, FILM COATED ORAL at 08:21

## 2023-03-19 ASSESSMENT — PAIN SCALES - GENERAL: PAINLEVEL_OUTOF10: 0

## 2023-03-19 NOTE — PROGRESS NOTES
Patient has been reassessed. I assisted him in his personal hygiene and put fresh linen on the bed while he sat on the commode and use the used the urinal. Patient denied any pain. Patient was able to help in the bathing process as I assisted in areas that was hard to reach. His wife is here visiting him and the two of them are watching television. He have had multiple visitors today. I ask if anything else was needed at the time and he denied anything else was needed at the time. Patient call light was put in place and he was notified that someone from the healthcare team will be back to do an hourly round to check on him.

## 2023-03-19 NOTE — PROGRESS NOTES
Patient is alert and oriented x4. Speech is clear and appropreiate. Hand  is strong and equal bilaterally. Upper extremities are pink, warm and dry. Upper extremity movement is present. Patient denies numbness and tingling. No arm drift present. Skin turgor and cap refill less than 3 seconds. Lung sounds are clear throughout. Respiratory rate is regular in rhythm and moderate. Abdomen is flat. Bowel sounds are active in all four quadrants. Patients denies any tenderness and discomfort in the abdomen when palpated. IV site in the right forearm is dry and intact no signs of infiltration or leakage. Lower extremities are warm dry and appropriate. No signs of edema. Patients turns well with assistance. Call light is within reach and patient was notified that someone from the healthcare team will be back to check on him.

## 2023-03-19 NOTE — PLAN OF CARE
Problem: Safety - Adult  Goal: Free from fall injury  Outcome: Progressing     Problem: ABCDS Injury Assessment  Goal: Absence of physical injury  Outcome: Progressing     Problem: Chronic Conditions and Co-morbidities  Goal: Patient's chronic conditions and co-morbidity symptoms are monitored and maintained or improved  Outcome: Progressing   Care plan reviewed with patient. Patient verbalizes understanding of the plan of care and contributes to goal setting.

## 2023-03-19 NOTE — PROGRESS NOTES
Enzymes:  No results for input(s): CKTOTAL, CKMB, CKMBINDEX, TROPONINI in the last 72 hours.     CBC:   Lab Results   Component Value Date/Time    WBC 13.1 03/19/2023 05:31 AM    RBC 3.29 03/19/2023 05:31 AM    HGB 10.1 03/19/2023 05:31 AM    HCT 32.1 03/19/2023 05:31 AM     03/19/2023 05:31 AM       CMP:    Lab Results   Component Value Date/Time     03/19/2023 05:31 AM    K 3.5 03/19/2023 05:31 AM    K 4.5 03/09/2022 05:56 AM     03/19/2023 05:31 AM    CO2 20 03/19/2023 05:31 AM    BUN 14 03/19/2023 05:31 AM    CREATININE 0.4 03/19/2023 05:31 AM    LABGLOM >60 03/19/2023 05:31 AM    GLUCOSE 85 03/19/2023 05:31 AM    GLUCOSE 104 05/11/2012 07:14 AM    CALCIUM 10.9 03/19/2023 05:31 AM       Hepatic Function Panel:    Lab Results   Component Value Date/Time    ALKPHOS 114 03/17/2023 02:36 PM    ALT 6 03/17/2023 02:36 PM    AST 32 03/17/2023 02:36 PM    PROT 6.1 03/17/2023 02:36 PM    PROT 7.5 09/14/2016 07:42 AM    BILITOT 0.9 03/17/2023 02:36 PM    BILIDIR 0.3 03/17/2023 02:36 PM    LABALBU 3.7 03/17/2023 02:36 PM    LABALBU 4.7 05/11/2012 07:14 AM       Magnesium:    Lab Results   Component Value Date/Time    MG 1.5 03/19/2023 05:31 AM       PT/INR:    Lab Results   Component Value Date/Time    INR 1.16 03/09/2022 05:56 AM       HgBA1c:    Lab Results   Component Value Date/Time    LABA1C 5.7 09/15/2022 07:25 AM       FLP:    Lab Results   Component Value Date/Time    TRIG 86 09/15/2022 07:26 AM    HDL 49 09/15/2022 07:26 AM    LDLCALC 58 09/15/2022 07:26 AM       TSH:  No results found for: TSH      Assessment/ Plan:  Fatigue, shortness of breath and weight loss; CAP  HFpEF; NYHA III CHF  Bumex 1 mg IV QD  Strict I & O: 640/550  Daily wt: 1603/17 - no wt today  2 GM Na diet  2L fluid restriction  Monitor labs - Keep K > 4.0; Keep Mag >/=2.0  Permament afib on Eliquis  Rate controlled  Eliquis on hold  Known CAD  No intervention - medical treatment only  Moderate AS  Echo 3/18/23 with EF 50 - 55%; transaortic velocity 3.9 m/s, mean gradient 38 mmHg, max gradient 61 mmHg, LINNEA 0.81 cm2  suggestive of severe aortic stenosis. There was mild aortic regurgitation. There was mild-moderate mitral regurgitation. There was severe tricuspid regurgitation. Assuming RAP = 20 mmHg, the estimated RVSP = 67 mmHg. Will need LHC and RHC once diuresis complete  Start TAVR work-up with TAVR CT, carotid duplex and CTS consultation  Had recent dental check-up in Jan. 2023; reports no issues - will obtain statement from dentist and bring to cardiology office  HTN  D-PM  Pilonidal cyst  Opened spontaneously ~ 5 weeks ago - infected and treated with Abx; now scabbed over - occasionally has small amounts drainage; no recurrent infection  Evaluated per Gen Surgery - Dr. Az Chance - needs excision - pending    Continue diuresis. RHC, LHC prior to d/c once volume overload resolved. Start TAVR work-up. Discussed with patient and spouse. Verbalizes understanding. Timing of pilonidal cyst excision will need to be discussed. Will follow.    Electronically signed by SID Garcia CNP on 3/19/2023 at 4:36 PM

## 2023-03-19 NOTE — PLAN OF CARE
Problem: Safety - Adult  Goal: Free from fall injury  Outcome: Progressing     Problem: ABCDS Injury Assessment  Goal: Absence of physical injury  Outcome: Progressing     Problem: Chronic Conditions and Co-morbidities  Goal: Patient's chronic conditions and co-morbidity symptoms are monitored and maintained or improved  Outcome: Progressing     Problem: Pain  Goal: Verbalizes/displays adequate comfort level or baseline comfort level  Outcome: Progressing

## 2023-03-19 NOTE — PROGRESS NOTES
finasteride  5 mg Oral Daily    gemfibrozil  600 mg Oral BID AC    mesalamine  800 mg Oral BID    rosuvastatin  5 mg Oral Daily    pantoprazole  40 mg Oral Daily    bumetanide  1 mg IntraVENous Daily    sodium chloride flush  5-40 mL IntraVENous 2 times per day    cefTRIAXone (ROCEPHIN) IV  1,000 mg IntraVENous Q24H    azithromycin  500 mg IntraVENous Q24H     PRN Meds: baclofen, potassium chloride **OR** potassium alternative oral replacement **OR** potassium chloride, magnesium sulfate, sodium chloride flush, sodium chloride, ondansetron **OR** ondansetron, acetaminophen **OR** acetaminophen      Intake/Output Summary (Last 24 hours) at 3/19/2023 1047  Last data filed at 3/19/2023 0842  Gross per 24 hour   Intake 400 ml   Output 950 ml   Net -550 ml         Diet:  ADULT DIET; Regular; Low Fat/Low Chol/High Fiber/TRAVIS; Low Sodium (2 gm); 1500 ml; Gluten free diet    Exam:  /63   Pulse 60   Temp 98.3 °F (36.8 °C) (Oral)   Resp 18   Ht 6' 1\" (1.854 m)   Wt 160 lb (72.6 kg)   SpO2 97%   BMI 21.11 kg/m²     General appearance: No apparent distress, appears stated age and cooperative. Respiratory:  Normal respiratory effort. Clear to auscultation, bilaterally without Rales/Wheezes/Rhonchi. Cardiovascular: Regular rate and rhythm with normal S1/S2 without murmurs, rubs or gallops. Abdomen: Soft, non-tender, non-distended with normal bowel sounds.   Extremities: 3+ pitting edema  Skin:  no rashes    Labs:   Recent Labs     03/17/23  1436 03/18/23  0623 03/19/23  0531   WBC 12.6* 13.3* 13.1*   HGB 10.7* 10.5* 10.1*   HCT 32.6* 32.3* 32.1*    174 171       Recent Labs     03/17/23  1436 03/18/23  0623 03/18/23  2121 03/19/23  0531   * 134*  --  135   K 3.7 2.9* 4.0 3.5   CL 97* 100  --  104   CO2 23 22*  --  20*   BUN 19 15  --  14   CREATININE 0.4 0.3*  --  0.4   CALCIUM 12.1* 11.5*  --  10.9*       Recent Labs     03/17/23  1436   AST 32   ALT 6*   BILIDIR 0.3   BILITOT 0.9   ALKPHOS 114 sinuses and mastoid air cells are normally aerated. There is no suspicious calvarial abnormality. 1. Stable CT scan of the brain, no interval change since previous study dated 24th of November 2008. 2. Diffusion MRI scan of the brain may be helpful for better evaluation if the patient has persistent symptoms. **This report has been created using voice recognition software. It may contain minor errors which are inherent in voice recognition technology. ** Final report electronically signed by DR Rubin Bueno on 3/17/2023 3:29 PM      DVT prophylaxis: [] Lovenox                                 [] SCDs                                 [] SQ Heparin                                 [] Encourage ambulation           [] Already on Anticoagulation     Code Status: Full Code    Tele:   [] yes             [] no    Active Hospital Problems    Diagnosis Date Noted    Pneumonia due to infectious organism, unspecified laterality, unspecified part of lung [J18.9] 03/17/2023     Priority: Medium       Electronically signed by Fuad Velasquez MD on 3/19/2023 at 10:47 AM

## 2023-03-20 ENCOUNTER — APPOINTMENT (OUTPATIENT)
Dept: PHYSICAL THERAPY | Age: 83
End: 2023-03-20
Payer: MEDICARE

## 2023-03-20 ENCOUNTER — APPOINTMENT (OUTPATIENT)
Dept: CT IMAGING | Age: 83
End: 2023-03-20
Payer: MEDICARE

## 2023-03-20 ENCOUNTER — APPOINTMENT (OUTPATIENT)
Dept: INTERVENTIONAL RADIOLOGY/VASCULAR | Age: 83
End: 2023-03-20
Payer: MEDICARE

## 2023-03-20 LAB
ANION GAP SERPL CALC-SCNC: 8 MEQ/L (ref 8–16)
BASOPHILS ABSOLUTE: 0.1 THOU/MM3 (ref 0–0.1)
BASOPHILS NFR BLD AUTO: 0.5 %
BUN SERPL-MCNC: 13 MG/DL (ref 7–22)
CALCIUM SERPL-MCNC: 10.7 MG/DL (ref 8.5–10.5)
CHLORIDE SERPL-SCNC: 103 MEQ/L (ref 98–111)
CO2 SERPL-SCNC: 23 MEQ/L (ref 23–33)
CREAT SERPL-MCNC: 0.4 MG/DL (ref 0.4–1.2)
DEPRECATED RDW RBC AUTO: 51.9 FL (ref 35–45)
EOSINOPHIL NFR BLD AUTO: 4.9 %
EOSINOPHILS ABSOLUTE: 0.7 THOU/MM3 (ref 0–0.4)
ERYTHROCYTE [DISTWIDTH] IN BLOOD BY AUTOMATED COUNT: 15 % (ref 11.5–14.5)
GFR SERPL CREATININE-BSD FRML MDRD: > 60 ML/MIN/1.73M2
GLUCOSE SERPL-MCNC: 91 MG/DL (ref 70–108)
HCT VFR BLD AUTO: 31.3 % (ref 42–52)
HGB BLD-MCNC: 10.4 GM/DL (ref 14–18)
IMM GRANULOCYTES # BLD AUTO: 0.08 THOU/MM3 (ref 0–0.07)
IMM GRANULOCYTES NFR BLD AUTO: 0.6 %
LYMPHOCYTES ABSOLUTE: 1.2 THOU/MM3 (ref 1–4.8)
LYMPHOCYTES NFR BLD AUTO: 9.2 %
MAGNESIUM SERPL-MCNC: 1.4 MG/DL (ref 1.6–2.4)
MCH RBC QN AUTO: 31.1 PG (ref 26–33)
MCHC RBC AUTO-ENTMCNC: 33.2 GM/DL (ref 32.2–35.5)
MCV RBC AUTO: 93.7 FL (ref 80–94)
MONOCYTES ABSOLUTE: 1.2 THOU/MM3 (ref 0.4–1.3)
MONOCYTES NFR BLD AUTO: 9.1 %
NEUTROPHILS NFR BLD AUTO: 75.7 %
NRBC BLD AUTO-RTO: 0 /100 WBC
PLATELET # BLD AUTO: 196 THOU/MM3 (ref 130–400)
PMV BLD AUTO: 9.6 FL (ref 9.4–12.4)
POTASSIUM SERPL-SCNC: 3.6 MEQ/L (ref 3.5–5.2)
RBC # BLD AUTO: 3.34 MILL/MM3 (ref 4.7–6.1)
SEGMENTED NEUTROPHILS ABSOLUTE COUNT: 10.1 THOU/MM3 (ref 1.8–7.7)
SODIUM SERPL-SCNC: 134 MEQ/L (ref 135–145)
WBC # BLD AUTO: 13.3 THOU/MM3 (ref 4.8–10.8)

## 2023-03-20 PROCEDURE — 83735 ASSAY OF MAGNESIUM: CPT

## 2023-03-20 PROCEDURE — 85025 COMPLETE CBC W/AUTO DIFF WBC: CPT

## 2023-03-20 PROCEDURE — 74174 CTA ABD&PLVS W/CONTRAST: CPT

## 2023-03-20 PROCEDURE — 2580000003 HC RX 258: Performed by: HOSPITALIST

## 2023-03-20 PROCEDURE — APPSS60 APP SPLIT SHARED TIME 46-60 MINUTES: Performed by: PHYSICIAN ASSISTANT

## 2023-03-20 PROCEDURE — 94669 MECHANICAL CHEST WALL OSCILL: CPT

## 2023-03-20 PROCEDURE — 6370000000 HC RX 637 (ALT 250 FOR IP): Performed by: HOSPITALIST

## 2023-03-20 PROCEDURE — 2500000003 HC RX 250 WO HCPCS

## 2023-03-20 PROCEDURE — 6360000002 HC RX W HCPCS: Performed by: HOSPITALIST

## 2023-03-20 PROCEDURE — 97110 THERAPEUTIC EXERCISES: CPT

## 2023-03-20 PROCEDURE — 1200000000 HC SEMI PRIVATE

## 2023-03-20 PROCEDURE — 97162 PT EVAL MOD COMPLEX 30 MIN: CPT

## 2023-03-20 PROCEDURE — 71275 CT ANGIOGRAPHY CHEST: CPT

## 2023-03-20 PROCEDURE — 97116 GAIT TRAINING THERAPY: CPT

## 2023-03-20 PROCEDURE — 6370000000 HC RX 637 (ALT 250 FOR IP)

## 2023-03-20 PROCEDURE — 93880 EXTRACRANIAL BILAT STUDY: CPT

## 2023-03-20 PROCEDURE — 2500000003 HC RX 250 WO HCPCS: Performed by: HOSPITALIST

## 2023-03-20 PROCEDURE — 80048 BASIC METABOLIC PNL TOTAL CA: CPT

## 2023-03-20 PROCEDURE — 6360000004 HC RX CONTRAST MEDICATION: Performed by: INTERNAL MEDICINE

## 2023-03-20 PROCEDURE — 99232 SBSQ HOSP IP/OBS MODERATE 35: CPT | Performed by: HOSPITALIST

## 2023-03-20 PROCEDURE — 36415 COLL VENOUS BLD VENIPUNCTURE: CPT

## 2023-03-20 RX ORDER — BUMETANIDE 0.25 MG/ML
1 INJECTION INTRAMUSCULAR; INTRAVENOUS 2 TIMES DAILY
Status: DISCONTINUED | OUTPATIENT
Start: 2023-03-20 | End: 2023-03-31 | Stop reason: HOSPADM

## 2023-03-20 RX ORDER — LOPERAMIDE HYDROCHLORIDE 2 MG/1
2 CAPSULE ORAL 4 TIMES DAILY PRN
Status: DISCONTINUED | OUTPATIENT
Start: 2023-03-20 | End: 2023-03-31 | Stop reason: HOSPADM

## 2023-03-20 RX ORDER — POTASSIUM CHLORIDE 20 MEQ/1
40 TABLET, EXTENDED RELEASE ORAL ONCE
Status: COMPLETED | OUTPATIENT
Start: 2023-03-20 | End: 2023-03-20

## 2023-03-20 RX ADMIN — BUMETANIDE 1 MG: 0.25 INJECTION INTRAMUSCULAR; INTRAVENOUS at 09:12

## 2023-03-20 RX ADMIN — MAGNESIUM SULFATE HEPTAHYDRATE 2000 MG: 40 INJECTION, SOLUTION INTRAVENOUS at 11:41

## 2023-03-20 RX ADMIN — SODIUM CHLORIDE, PRESERVATIVE FREE 10 ML: 5 INJECTION INTRAVENOUS at 20:34

## 2023-03-20 RX ADMIN — CARVEDILOL 25 MG: 25 TABLET, FILM COATED ORAL at 17:03

## 2023-03-20 RX ADMIN — Medication 1 CAPSULE: at 09:12

## 2023-03-20 RX ADMIN — GEMFIBROZIL 600 MG: 600 TABLET ORAL at 17:03

## 2023-03-20 RX ADMIN — CEFTRIAXONE SODIUM 1000 MG: 1 INJECTION, POWDER, FOR SOLUTION INTRAMUSCULAR; INTRAVENOUS at 20:38

## 2023-03-20 RX ADMIN — MESALAMINE 800 MG: 400 CAPSULE, DELAYED RELEASE ORAL at 09:12

## 2023-03-20 RX ADMIN — FINASTERIDE 5 MG: 5 TABLET, FILM COATED ORAL at 09:12

## 2023-03-20 RX ADMIN — POTASSIUM CHLORIDE 40 MEQ: 1500 TABLET, EXTENDED RELEASE ORAL at 11:41

## 2023-03-20 RX ADMIN — BUMETANIDE 1 MG: 0.25 INJECTION INTRAMUSCULAR; INTRAVENOUS at 21:41

## 2023-03-20 RX ADMIN — Medication 1 CAPSULE: at 17:03

## 2023-03-20 RX ADMIN — AZITHROMYCIN DIHYDRATE 500 MG: 500 INJECTION, POWDER, LYOPHILIZED, FOR SOLUTION INTRAVENOUS at 21:47

## 2023-03-20 RX ADMIN — CARVEDILOL 25 MG: 25 TABLET, FILM COATED ORAL at 09:12

## 2023-03-20 RX ADMIN — MESALAMINE 800 MG: 400 CAPSULE, DELAYED RELEASE ORAL at 21:51

## 2023-03-20 RX ADMIN — GEMFIBROZIL 600 MG: 600 TABLET ORAL at 05:15

## 2023-03-20 RX ADMIN — LOPERAMIDE HYDROCHLORIDE 2 MG: 2 CAPSULE ORAL at 12:35

## 2023-03-20 RX ADMIN — IOPAMIDOL 80 ML: 755 INJECTION, SOLUTION INTRAVENOUS at 15:14

## 2023-03-20 RX ADMIN — PANTOPRAZOLE SODIUM 40 MG: 40 TABLET, DELAYED RELEASE ORAL at 05:16

## 2023-03-20 RX ADMIN — ROSUVASTATIN 5 MG: 10 TABLET, FILM COATED ORAL at 09:12

## 2023-03-20 NOTE — PLAN OF CARE
Problem: Safety - Adult  Goal: Free from fall injury  3/20/2023 0212 by Darrick Stafford RN  Outcome: Progressing  3/19/2023 1952 by Darrick Stafford RN  Outcome: Progressing     Problem: ABCDS Injury Assessment  Goal: Absence of physical injury  3/20/2023 0212 by Darrick Stafford RN  Outcome: Progressing  3/19/2023 1952 by Darrick Stafford RN  Outcome: Progressing     Problem: Chronic Conditions and Co-morbidities  Goal: Patient's chronic conditions and co-morbidity symptoms are monitored and maintained or improved  3/20/2023 0212 by Darrick Stafford RN  Outcome: Progressing  Flowsheets (Taken 3/19/2023 2001)  Care Plan - Patient's Chronic Conditions and Co-Morbidity Symptoms are Monitored and Maintained or Improved:   Monitor and assess patient's chronic conditions and comorbid symptoms for stability, deterioration, or improvement   Collaborate with multidisciplinary team to address chronic and comorbid conditions and prevent exacerbation or deterioration   Update acute care plan with appropriate goals if chronic or comorbid symptoms are exacerbated and prevent overall improvement and discharge  3/19/2023 1952 by Darrick Stafford RN  Outcome: Progressing     Problem: Pain  Goal: Verbalizes/displays adequate comfort level or baseline comfort level  3/20/2023 0212 by Darrick Stafford RN  Outcome: Progressing  Flowsheets (Taken 3/20/2023 0015)  Verbalizes/displays adequate comfort level or baseline comfort level:   Encourage patient to monitor pain and request assistance   Assess pain using appropriate pain scale   Administer analgesics based on type and severity of pain and evaluate response   Implement non-pharmacological measures as appropriate and evaluate response   Consider cultural and social influences on pain and pain management   Notify Licensed Independent Practitioner if interventions unsuccessful or patient reports new pain  3/19/2023 1952 by Darrick Stafford RN  Outcome: Progressing  Flowsheets (Taken 3/19/2023 1951)  Verbalizes/displays adequate comfort level or baseline comfort level:   Encourage patient to monitor pain and request assistance   Assess pain using appropriate pain scale   Administer analgesics based on type and severity of pain and evaluate response   Implement non-pharmacological measures as appropriate and evaluate response   Consider cultural and social influences on pain and pain management   Notify Licensed Independent Practitioner if interventions unsuccessful or patient reports new pain     Problem: Skin/Tissue Integrity  Goal: Absence of new skin breakdown  Description: 1. Monitor for areas of redness and/or skin breakdown  2. Assess vascular access sites hourly  3. Every 4-6 hours minimum:  Change oxygen saturation probe site  4. Every 4-6 hours:  If on nasal continuous positive airway pressure, respiratory therapy assess nares and determine need for appliance change or resting period.   Outcome: Progressing

## 2023-03-20 NOTE — PROGRESS NOTES
EMMIE    Following session, patient left in safe position with all fall risk precautions in place    Ashlee Cain ([facility]x) PT, DPT

## 2023-03-20 NOTE — CONSULTS
CT/CV TAVR Consultation    3/20/2023 7:35 AM    Surgeon:  Dr. Dulce Delcid    Reason for Consult: TAVR evaluation, severe symptomatic AS    HPI:    Mr. Roseline Villanueva is a pleasant 78 y/o  gentleman with PMhx including Afib, CAD, HTN, HLD, s/p PPM, hx of mix LCX 60% and ostial 80% LAD that has been managed medically and former smoker. He has developed increased symptoms of SOB, ZIMMERMAN and fatigue over the past couple months.        Vital Signs: /63   Pulse 75   Temp 98.5 °F (36.9 °C) (Oral)   Resp 18   Ht 6' 1\" (1.854 m)   Wt 160 lb 3.2 oz (72.7 kg)   SpO2 94%   BMI 21.14 kg/m²    Temp (24hrs), Av.2 °F (36.8 °C), Min:97.6 °F (36.4 °C), Max:99 °F (37.2 °C)    Labs:   CBC:   Recent Labs     23  0623 23  0531 23  0517   WBC 13.3* 13.1* 13.3*   HGB 10.5* 10.1* 10.4*   HCT 32.3* 32.1* 31.3*   MCV 95.0* 97.6* 93.7    171 196     BMP:   Recent Labs     23  1436 23  0623 23  1514 23  2121 23  0531 23  0517   * 134*  --   --  135 134*   K 3.7 2.9*  --  4.0 3.5 3.6   CL 97* 100  --   --  104 103   CO2 23 22*  --   --  20* 23   BUN 19 15  --   --  14 13   CREATININE 0.4 0.3*  --   --  0.4 0.4   MG 1.2*  --  1.3*  --  1.5*  --      Trop:   Lab Results   Component Value Date    TROPONINT 0.011 (A) 2023     Last HgA1C:   Lab Results   Component Value Date    LABA1C 5.7 09/15/2022     Imaging:  TTE: 3/18/23  Transthoracic Echocardiography Report (TTE)      Demographics      Patient Name   Daniel Greenwood  Gender              Male                  L      MR #           [id number]      Race                                                    Ethnicity      Account #      [de-identified]      Room Number         0005      Accession      [id number]     Date of Study       2023   Number      Date of Birth  1940     Referring Physician Jesse Lu MD      Age            79 year(s) Jhony Carmichael APRN - CNP   mesalamine (DELZICOL) 800 MG TBEC TBEC tablet Take 800 mg by mouth daily     Historical Provider, MD   Coenzyme Q10 (COQ10) 200 MG CAPS Take 200 mg/day by mouth 2 times daily  8/24/17   Mracial Sanders MD     PastMedical History:  Britt Rincon  has a past medical history of Arthritis, Atrial fibrillation [facility]), Atrial fibrillation ([facility] [address] CAD (coronary artery disease), CHF (congestive heart failure) ([facility] Dyslipidemia, Enlarged prostate, GERD (gastroesophageal reflux disease), Hyperlipidemia, Hypertension, and [employer] dual pacemaker . Past Surgical History:  The patient  has a past surgical history that includes Pacemaker insertion (2008); Bunionectomy (Left, 1990's); Pilonidal cyst drainage (1970); hernia repair (Right, 1960's); Cardiac catheterization; Cataract removal with implant (Left, 2015); Colonoscopy; Colonoscopy (2016); Upper gastrointestinal endoscopy (2016); and pacemaker placement. Allergies: The patient is allergic to latex, gluten meal, vasotec [enalaprilat], neosporin [neomycin-polymyxin-gramicidin], sulfa antibiotics, and voltaren [diclofenac sodium]. Family History: This patient's family history includes Cancer in his father; Kidney Disease in his mother. Social History:  Britt Rincon  reports that he quit smoking about 58 years ago. His smoking use included cigarettes. He has a 7.50 pack-year smoking history. He has never used smokeless tobacco. He reports that he does not drink alcohol and does not use drugs. ROS:  Constitutional: Negative for activity change, chills, fatigue, fever and unexpected weight change. HENT: Negative for congestion, facial swelling, sore throat, and changes in voice. Eyes: Negative for photophobia, redness, itching and visual disturbance. Respiratory: +SOB and ZIMMERMAN  Cardiovascular: Negative for chest pain, palpitation.   Mild leg swelling  Gastrointestinal: Negative for abdominal distention, constipation, nausea

## 2023-03-20 NOTE — PROGRESS NOTES
[facility] Wound Ostomy Continence Nurse  Progress Note       Stella Moreau  AGE: 79 y.o. GENDER: male  : 1940  UNIT: 5K-05/005-A  TODAY'S DATE:  3/20/2023  ADMISSION DATE: 3/17/2023  1:56 PM    Summary:     Consulted for Stage 2 to left buttocks. Wound documentation unclear. Spoke with primary RN to verify presence and stage of wound to left buttock. RN to assess and return call. 1200- Primary nurse contacts wound care. Pt has blanching redness to buttock, no open area. Continue to monitor and offload area.

## 2023-03-20 NOTE — PLAN OF CARE
Problem: Respiratory - Adult  Goal: Clear lung sounds  Outcome: Progressing  Note: Acapella to help clear secretions. Patient mutually agreed on goals.

## 2023-03-20 NOTE — PROGRESS NOTES
Cardiology Progress Note      Patient:  Winifred Dennis  YOB: 1940  MRN: [id number]   Acct: [de-identified]  Admit Date:  3/17/2023  Primary Cardiologist: Yamilka Payne MD  Rationale for Cardiology consult: shortness of breath     HPI/PMH: Per Dr. Tarun Dobson consult note of 3/18/23: This is a pleasant 79 y.o. male hx of Afib, CAD, HTN, HLD, hx of mix LCX 60% and ostial 80% LAD told to continue medical therapy. He has moderate AS. Wife and daughter present. Has been fatigued, sob, and developing worsening LE edema. He has been taking his meds. He is on Eliquis, no bleeding. He has no passed out, but does get lightheaded. Has lost 30 lbs as he is not hungry. No chest pain or angina. He was stopped off Digoxin. He is not on GDMT but was being actively managed for it. Cr 0.3, Trop 0.011, K 2.9, CO2 22, Phos 2.1. Hgb 10.5. ECG is V-paced rhythm. Wife states he has no energy, and daughter confirms. Worse over the last couple months. Cannot do ADLs without getting sob. No known malignancies. Chief Complaint: \"Feeling a little better - breathing fine now - legs seem to be going down some\". 03/19/2023:   Stable OVN  Diuresis continues  No chest discomfort  Mild ZIMMERMAN when up to BR  Able to lie flat in bed on 1 pillow during assessment  2+ bilateral pedal edema to 1+ just below knees bilaterally    Subjective (Events in last 24 hours):   Pt awake, alert. NAD. Denies cp, mild SOB is better. C/o significnat swelling. Dr Prashant Hardy present. [facility] Will finish some of the other workup while here. Was receiving IVF, which were stopped. Now diuresing.    Objective:   /61   Pulse 60   Temp 98.6 °F (37 °C) (Oral)   Resp 16   Ht 6' 1\" (1.854 m)   Wt 160 lb 3.2 oz (72.7 kg)   SpO2 97%   BMI 21.14 kg/m²      vss  TELEMETRY: paced    Physical Exam:  General Appearance: alert and oriented to person, place and time, in no acute distress  Cardiovascular: normal

## 2023-03-20 NOTE — CARE COORDINATION
Independent in ADLs/IADLs, Shopping    Family can provide assistance at DC: Yes  Would you like Case Management to discuss the discharge plan with any other family members/significant others, and if so, who? No  Plans to Return to Present Housing: Yes  Other Identified Issues/Barriers to RETURNING to current housing: None  Potential Assistance needed at discharge: N/A            Potential DME:    Patient expects to discharge to: [address] [place] [address] for transportation at discharge: Family    Financial    Payor: Korin Schwarz / Plan: Vernida Baumgarten ESSENTIAL/PLUS / Product Type: *No Product type* /     Does insurance require precert for SNF: Yes    Potential assistance Purchasing Medications: No  Meds-to-Beds request: Yes      [facility] [place] [facility] [address] [facility] [address]  Phone: [phone] Fax: [phone]    [facility] Mail - [address], [phone] [facility] [phone]  [address] [facility]  [address] [address]  Phone: [phone] Fax: [phone]      Notes:    Factors facilitating achievement of predicted outcomes: Family support, Motivated, Cooperative, Pleasant, Sense of humor, Good insight into deficits, and Has needed Durable Medical Equipment at home    Barriers to discharge: Medical stability. Additional Case Management Notes: Patient presents due to cough and weakness. WBC 13.3. Cardiology following, CVS consult for TAVR, IV fluids, IV Zithromax, Rocephin, prn Tylenol and Zofran, Magnesium and Potassium replacement protocol, daily BMP and CBC, NPO, daily weights, PT/OT, Wound/Ostomy RN evaluation.      Procedure: N/A    The Plan for Transition of Care is related to the following treatment goals of Hypercalcemia [E83.52]  Hypomagnesemia [E83.42]  Generalized weakness [R53.1]  Elevated troponin [R77.8]  Pneumonia due to infectious organism, unspecified laterality, unspecified part of lung [J18.9]  Acute congestive heart failure, unspecified heart failure type [facility]) [I50.9]    Patient Goals/Plan/Treatment Preferences: Met with Celeste Burnett, his wife and daughter present at bedside. Celeste Burnett is able to afford his medications and has the DME needed. His wife will drive him home at discharge. Celeste Burnett is current with outpatient therapy at [facility]. He will need clearance to return to outpatient therapy at discharge. Monitoring for therapy evaluations. Transportation/Food Security/Housekeeping Addressed: No issues identified.      Chavez Mares RN  Case Management Department

## 2023-03-20 NOTE — PROGRESS NOTES
Patient is in bed awake, vitals within normal limits, no change in condition, patient assisted to the bed side commode, carlin care done, patient is noted to have an open area on his right buttocks, area washed with normal saline butt paste applied to area left open to air, procedure tolerated well. Will continue to monitor.

## 2023-03-20 NOTE — PROGRESS NOTES
Hopeful discharge in 24 to 48 hours. Monitor urine output    Chief Complaint: Weakness    Hospital Course:   As noted per HPI:  \"The patient is a 79 y.o. male who presents with complaints of generalized weakness. Patient reports that he has not been feeling well for the last couple weeks. Patient reports that he has had a cough that was initially dry now productive sometimes of pink or clear sputum. He is on Eliquis for blood thinners. Family is also at bedside endorse that he has had a poor appetite ever since he was treated for Shigella. Patient denies any fevers or chills, nausea or vomiting. Denies any abdominal pain or diarrhea. Patient reports that today he was unable to lift himself off the toilet, EMS was called today for lift assistance and then family decided they would like him evaluated. Patient denies any chest pain, shortness of breath. Does report that he has significant lower extremity swelling from his knees downwards. He follows closely with cardiology and was take off of Norvasc however has had no improvement. \"    3/20/2023: Increase Bumex 1 mg IV twice daily. Monitor urine output. TAVR work-up to be done as outpatient. Subjective/HPI:   Patient seen in the a.m. no acute events overnight. Patient states he is continuing to feel much improved. States he still notices fluid on his bilateral lower extremities. Does not feel increased shortness of breath, chest pains, abdominal pains, nausea, vomiting. PMH, SURGICAL HX, FH, SOCIAL HX reviewed and updated as needed.     Medications:  Reviewed    Infusion Medications    sodium chloride       Scheduled Medications    bumetanide  1 mg IntraVENous BID    lactobacillus  1 capsule Oral BID WC    carvedilol  25 mg Oral BID WC    finasteride  5 mg Oral Daily    gemfibrozil  600 mg Oral BID AC    mesalamine  800 mg Oral BID    rosuvastatin  5 mg Oral Daily    pantoprazole  40 mg Oral Daily    sodium chloride flush  5-40 mL IntraVENous 2 CONTRAST   Final Result      1. Stable CT scan of the brain, no interval change since previous study dated 24th of November 2008. 2. Diffusion MRI scan of the brain may be helpful for better evaluation if the patient has persistent symptoms. **This report has been created using voice recognition software. It may contain minor errors which are inherent in voice recognition technology. **      Final report electronically signed by DR Karyn Kohli on 3/17/2023 3:29 PM      XR CHEST (2 VW)   Final Result   1. Mild cardiomegaly. Permanent dual-chamber pacemaker. 2. Mild retrocardiac atelectasis/pneumonia. Questionable tiny effusion left side posteriorly. **This report has been created using voice recognition software. It may contain minor errors which are inherent in voice recognition technology. **      Final report electronically signed by Dr. Hyacinth Shah on 3/17/2023 2:51 PM      CTA ABDOMEN PELVIS W [address]    (Results Pending)   CTA CHEST W WO CONTRAST    (Results Pending)       Diet: Diet NPO Exceptions are: Ice Chips, Sips of Water with Meds    DVT prophylaxis: [] Lovenox                                 [x] SCDs                                 [] SQ Heparin                                 [] Encourage ambulation           [] Already on Anticoagulation     Disposition:    [] Home       [] TCU       [] Rehab       [] Psych       [] SNF       [] [facility]       [x] Other- pending clinical course    Code Status: Full Code    PT/OT Eval Status: ordered      Electronically signed by Ap Kang DO on 3/20/2023 at 2:06 PM

## 2023-03-20 NOTE — PROGRESS NOTES
Gildardo Rhoades 59  OCCUPATIONAL THERAPY MISSED TREATMENT NOTE  [facility] ONC MED 5K  5K-05/005-A      Date: 3/20/2023  Patient Name: Thierry Bishop        CSN: [id number]   : 1940  (79 y.o.)  Gender: male   Referring Practitioner: Dr. Krys Rubalcava MD  Diagnosis: Hypercalcemia         REASON FOR MISSED TREATMENT: Patient Refused. Pt was exhausted from a full day of testing and also being NPO.   He was waiting to have his diet order returned before he could order his meal.  He agreed to work with therapy in the AM.

## 2023-03-21 ENCOUNTER — APPOINTMENT (OUTPATIENT)
Dept: GENERAL RADIOLOGY | Age: 83
End: 2023-03-21
Payer: MEDICARE

## 2023-03-21 ENCOUNTER — TELEPHONE (OUTPATIENT)
Dept: CARDIOLOGY CLINIC | Age: 83
End: 2023-03-21

## 2023-03-21 ENCOUNTER — APPOINTMENT (OUTPATIENT)
Dept: ULTRASOUND IMAGING | Age: 83
End: 2023-03-21
Payer: MEDICARE

## 2023-03-21 LAB
ANION GAP SERPL CALC-SCNC: 12 MEQ/L (ref 8–16)
APTT PPP: 31.8 SECONDS (ref 22–38)
BASOPHILS ABSOLUTE: 0.1 THOU/MM3 (ref 0–0.1)
BASOPHILS NFR BLD AUTO: 0.5 %
BUN SERPL-MCNC: 12 MG/DL (ref 7–22)
CALCIUM SERPL-MCNC: 10.6 MG/DL (ref 8.5–10.5)
CHARACTER, BODY FLUID: NORMAL
CHLORIDE SERPL-SCNC: 102 MEQ/L (ref 98–111)
CO2 SERPL-SCNC: 22 MEQ/L (ref 23–33)
COLOR FLD: YELLOW
CREAT SERPL-MCNC: 0.4 MG/DL (ref 0.4–1.2)
DEPRECATED RDW RBC AUTO: 50.9 FL (ref 35–45)
DEPRECATED RDW RBC AUTO: 51.1 FL (ref 35–45)
EOSINOPHIL NFR BLD AUTO: 4.8 %
EOSINOPHILS ABSOLUTE: 0.7 THOU/MM3 (ref 0–0.4)
ERYTHROCYTE [DISTWIDTH] IN BLOOD BY AUTOMATED COUNT: 14.7 % (ref 11.5–14.5)
ERYTHROCYTE [DISTWIDTH] IN BLOOD BY AUTOMATED COUNT: 14.8 % (ref 11.5–14.5)
GFR SERPL CREATININE-BSD FRML MDRD: > 60 ML/MIN/1.73M2
GLUCOSE SERPL-MCNC: 100 MG/DL (ref 70–108)
GRANULOCYTES NFR FLD AUTO: 3 %
HCT VFR BLD AUTO: 33.3 % (ref 42–52)
HCT VFR BLD AUTO: 33.9 % (ref 42–52)
HGB BLD-MCNC: 10.8 GM/DL (ref 14–18)
HGB BLD-MCNC: 10.8 GM/DL (ref 14–18)
IMM GRANULOCYTES # BLD AUTO: 0.07 THOU/MM3 (ref 0–0.07)
IMM GRANULOCYTES NFR BLD AUTO: 0.5 %
INR PPP: 1.21 (ref 0.85–1.13)
LDH FLD L TO P-CCNC: 56 U/L
LDH SERPL L TO P-CCNC: 207 U/L (ref 100–190)
LYMPHOCYTES ABSOLUTE: 1.1 THOU/MM3 (ref 1–4.8)
LYMPHOCYTES NFR BLD AUTO: 7.3 %
MAGNESIUM SERPL-MCNC: 1.5 MG/DL (ref 1.6–2.4)
MCH RBC QN AUTO: 30.2 PG (ref 26–33)
MCH RBC QN AUTO: 30.3 PG (ref 26–33)
MCHC RBC AUTO-ENTMCNC: 31.9 GM/DL (ref 32.2–35.5)
MCHC RBC AUTO-ENTMCNC: 32.4 GM/DL (ref 32.2–35.5)
MCV RBC AUTO: 93.5 FL (ref 80–94)
MCV RBC AUTO: 94.7 FL (ref 80–94)
MESOTHELIAL CELLS BODY FLUID: NORMAL
MONOCYTES ABSOLUTE: 1.3 THOU/MM3 (ref 0.4–1.3)
MONOCYTES NFR BLD AUTO: 8.8 %
MONONUC CELLS NFR FLD AUTO: 97 %
NEUTROPHILS NFR BLD AUTO: 78.1 %
NRBC BLD AUTO-RTO: 0 /100 WBC
NUC CELL # FLD AUTO: 103 /CUMM (ref 0–500)
PATHOLOGIST REVIEW: NORMAL
PH BIFL: 7.58 [PH]
PLATELET # BLD AUTO: 192 THOU/MM3 (ref 130–400)
PLATELET # BLD AUTO: 211 THOU/MM3 (ref 130–400)
PMV BLD AUTO: 9.1 FL (ref 9.4–12.4)
PMV BLD AUTO: 9.2 FL (ref 9.4–12.4)
POTASSIUM SERPL-SCNC: 2.9 MEQ/L (ref 3.5–5.2)
PROT FLD-MCNC: 2.4 GM/DL
PROT SERPL-MCNC: 6.1 G/DL (ref 6.1–8)
RBC # BLD AUTO: 3.56 MILL/MM3 (ref 4.7–6.1)
RBC # BLD AUTO: 3.58 MILL/MM3 (ref 4.7–6.1)
RBC # FLD AUTO: < 2000 /CUMM
SEGMENTED NEUTROPHILS ABSOLUTE COUNT: 11.5 THOU/MM3 (ref 1.8–7.7)
SODIUM SERPL-SCNC: 136 MEQ/L (ref 135–145)
SPECIMEN: NORMAL
TOTAL VOLUME RECEIVED BODY FLUID: 73 ML
WBC # BLD AUTO: 14 THOU/MM3 (ref 4.8–10.8)
WBC # BLD AUTO: 14.7 THOU/MM3 (ref 4.8–10.8)

## 2023-03-21 PROCEDURE — 97110 THERAPEUTIC EXERCISES: CPT

## 2023-03-21 PROCEDURE — 6360000002 HC RX W HCPCS

## 2023-03-21 PROCEDURE — 83615 LACTATE (LD) (LDH) ENZYME: CPT

## 2023-03-21 PROCEDURE — 87075 CULTR BACTERIA EXCEPT BLOOD: CPT

## 2023-03-21 PROCEDURE — 85027 COMPLETE CBC AUTOMATED: CPT

## 2023-03-21 PROCEDURE — 6360000002 HC RX W HCPCS: Performed by: HOSPITALIST

## 2023-03-21 PROCEDURE — 36415 COLL VENOUS BLD VENIPUNCTURE: CPT

## 2023-03-21 PROCEDURE — 83735 ASSAY OF MAGNESIUM: CPT

## 2023-03-21 PROCEDURE — 87070 CULTURE OTHR SPECIMN AEROBIC: CPT

## 2023-03-21 PROCEDURE — 89050 BODY FLUID CELL COUNT: CPT

## 2023-03-21 PROCEDURE — 2580000003 HC RX 258: Performed by: HOSPITALIST

## 2023-03-21 PROCEDURE — 1200000000 HC SEMI PRIVATE

## 2023-03-21 PROCEDURE — 71045 X-RAY EXAM CHEST 1 VIEW: CPT

## 2023-03-21 PROCEDURE — 94669 MECHANICAL CHEST WALL OSCILL: CPT

## 2023-03-21 PROCEDURE — 0W993ZZ DRAINAGE OF RIGHT PLEURAL CAVITY, PERCUTANEOUS APPROACH: ICD-10-PCS | Performed by: RADIOLOGY

## 2023-03-21 PROCEDURE — 0FB13ZX EXCISION OF RIGHT LOBE LIVER, PERCUTANEOUS APPROACH, DIAGNOSTIC: ICD-10-PCS | Performed by: RADIOLOGY

## 2023-03-21 PROCEDURE — 85025 COMPLETE CBC W/AUTO DIFF WBC: CPT

## 2023-03-21 PROCEDURE — 80048 BASIC METABOLIC PNL TOTAL CA: CPT

## 2023-03-21 PROCEDURE — 99232 SBSQ HOSP IP/OBS MODERATE 35: CPT | Performed by: HOSPITALIST

## 2023-03-21 PROCEDURE — 32555 ASPIRATE PLEURA W/ IMAGING: CPT

## 2023-03-21 PROCEDURE — 2500000003 HC RX 250 WO HCPCS

## 2023-03-21 PROCEDURE — 88108 CYTOPATH CONCENTRATE TECH: CPT

## 2023-03-21 PROCEDURE — 85730 THROMBOPLASTIN TIME PARTIAL: CPT

## 2023-03-21 PROCEDURE — 87205 SMEAR GRAM STAIN: CPT

## 2023-03-21 PROCEDURE — 85610 PROTHROMBIN TIME: CPT

## 2023-03-21 PROCEDURE — 84155 ASSAY OF PROTEIN SERUM: CPT

## 2023-03-21 PROCEDURE — 83986 ASSAY PH BODY FLUID NOS: CPT

## 2023-03-21 PROCEDURE — 6370000000 HC RX 637 (ALT 250 FOR IP): Performed by: HOSPITALIST

## 2023-03-21 PROCEDURE — 99232 SBSQ HOSP IP/OBS MODERATE 35: CPT | Performed by: PHYSICIAN ASSISTANT

## 2023-03-21 PROCEDURE — 84157 ASSAY OF PROTEIN OTHER: CPT

## 2023-03-21 PROCEDURE — 97116 GAIT TRAINING THERAPY: CPT

## 2023-03-21 RX ORDER — HEPARIN SODIUM 1000 [USP'U]/ML
4000 INJECTION, SOLUTION INTRAVENOUS; SUBCUTANEOUS PRN
Status: DISCONTINUED | OUTPATIENT
Start: 2023-03-21 | End: 2023-03-23 | Stop reason: ALTCHOICE

## 2023-03-21 RX ORDER — HEPARIN SODIUM 10000 [USP'U]/100ML
5-30 INJECTION, SOLUTION INTRAVENOUS CONTINUOUS
Status: DISCONTINUED | OUTPATIENT
Start: 2023-03-21 | End: 2023-03-23

## 2023-03-21 RX ORDER — HEPARIN SODIUM 1000 [USP'U]/ML
4000 INJECTION, SOLUTION INTRAVENOUS; SUBCUTANEOUS ONCE
Status: COMPLETED | OUTPATIENT
Start: 2023-03-21 | End: 2023-03-21

## 2023-03-21 RX ORDER — HEPARIN SODIUM 1000 [USP'U]/ML
2000 INJECTION, SOLUTION INTRAVENOUS; SUBCUTANEOUS PRN
Status: DISCONTINUED | OUTPATIENT
Start: 2023-03-21 | End: 2023-03-23 | Stop reason: ALTCHOICE

## 2023-03-21 RX ADMIN — BUMETANIDE 1 MG: 0.25 INJECTION INTRAMUSCULAR; INTRAVENOUS at 21:14

## 2023-03-21 RX ADMIN — ROSUVASTATIN 5 MG: 10 TABLET, FILM COATED ORAL at 08:08

## 2023-03-21 RX ADMIN — CARVEDILOL 25 MG: 25 TABLET, FILM COATED ORAL at 08:08

## 2023-03-21 RX ADMIN — GEMFIBROZIL 600 MG: 600 TABLET ORAL at 16:16

## 2023-03-21 RX ADMIN — Medication 1 CAPSULE: at 16:16

## 2023-03-21 RX ADMIN — HEPARIN SODIUM 4000 UNITS: 1000 INJECTION, SOLUTION INTRAVENOUS; SUBCUTANEOUS at 20:01

## 2023-03-21 RX ADMIN — BUMETANIDE 1 MG: 0.25 INJECTION INTRAMUSCULAR; INTRAVENOUS at 08:08

## 2023-03-21 RX ADMIN — MESALAMINE 800 MG: 400 CAPSULE, DELAYED RELEASE ORAL at 21:16

## 2023-03-21 RX ADMIN — MAGNESIUM SULFATE HEPTAHYDRATE 2000 MG: 40 INJECTION, SOLUTION INTRAVENOUS at 10:41

## 2023-03-21 RX ADMIN — CEFTRIAXONE SODIUM 1000 MG: 1 INJECTION, POWDER, FOR SOLUTION INTRAMUSCULAR; INTRAVENOUS at 21:13

## 2023-03-21 RX ADMIN — CARVEDILOL 25 MG: 25 TABLET, FILM COATED ORAL at 16:16

## 2023-03-21 RX ADMIN — MESALAMINE 800 MG: 400 CAPSULE, DELAYED RELEASE ORAL at 08:08

## 2023-03-21 RX ADMIN — AZITHROMYCIN DIHYDRATE 500 MG: 500 INJECTION, POWDER, LYOPHILIZED, FOR SOLUTION INTRAVENOUS at 22:13

## 2023-03-21 RX ADMIN — POTASSIUM CHLORIDE 10 MEQ: 7.46 INJECTION, SOLUTION INTRAVENOUS at 23:21

## 2023-03-21 RX ADMIN — POTASSIUM CHLORIDE 10 MEQ: 7.46 INJECTION, SOLUTION INTRAVENOUS at 16:17

## 2023-03-21 RX ADMIN — FINASTERIDE 5 MG: 5 TABLET, FILM COATED ORAL at 08:08

## 2023-03-21 RX ADMIN — PANTOPRAZOLE SODIUM 40 MG: 40 TABLET, DELAYED RELEASE ORAL at 06:23

## 2023-03-21 RX ADMIN — POTASSIUM CHLORIDE 10 MEQ: 7.46 INJECTION, SOLUTION INTRAVENOUS at 10:28

## 2023-03-21 RX ADMIN — HEPARIN SODIUM AND DEXTROSE 12 UNITS/KG/HR: 10000; 5 INJECTION INTRAVENOUS at 20:01

## 2023-03-21 RX ADMIN — POTASSIUM CHLORIDE 10 MEQ: 7.46 INJECTION, SOLUTION INTRAVENOUS at 18:50

## 2023-03-21 RX ADMIN — Medication 1 CAPSULE: at 08:08

## 2023-03-21 RX ADMIN — GEMFIBROZIL 600 MG: 600 TABLET ORAL at 06:23

## 2023-03-21 RX ADMIN — SODIUM CHLORIDE, PRESERVATIVE FREE 10 ML: 5 INJECTION INTRAVENOUS at 08:10

## 2023-03-21 NOTE — PROGRESS NOTES
TR  Permanent afib  Hx CHB s/p PPM  CAD, medical therapy recommended in past  HTN  Pilonidal cyst - attending following  Hepatic masses - IR planning biopsy  Hypomagnesemia/hypokalemia - being replaced      Plan:    Daily I/o and weights  2 liter fluid restriction and 2gm sodium diet  Keep mag >2 and k >4  Cont diuresis  Thoracentesis scheduled for today  Liver biopsy scheduled for tomorrow  Daily BMP  Cont statin/BB  Home eliquis on hold  Recommend starting heparin or lovenox FS if ok with attending until all tests complete  CTS consulted - recommend TAVR over SAVR  Outpt f/up for TAVR w/up       Electronically signed by Inocencio Araujo PA-C on 3/21/2023 at 1:09 PM

## 2023-03-21 NOTE — PROGRESS NOTES
verbal cues, uncontrolled descent, with use of UE on armrest     Ambulation:  Stand By Assistance, with increased time for completion  Distance: 10' x2  Surface: Level Tile  Device:Rolling Walker  Gait Deviations: Forward Flexed Posture, Slow Inez, Decreased Step Length Bilaterally, Decreased Gait Speed, Decreased Heel Strike Bilaterally, and [employer] of Support    Balance:  Static Sitting Balance:  Supervision  Static Standing Balance: Stand By Assistance  Dynamic Standing Balance: Contact Guard Assistance    Exercise:  Patient was guided in 1 set(s) 15 reps of exercise to both lower extremities. Ankle pumps, Glut sets, Quad sets, Heelslides, Hip abduction/adduction, and Straight leg raises. Exercises were completed for increased independence with functional mobility. Mod A for RLE SLR     Functional Outcome Measures: Completed  Balance Score: 2  Gait Score: 4  Tinetti Total Score: 6  AM-PAC Inpatient Mobility without Stair Climbing Raw Score : 14  AM-PAC Inpatient without Stair Climbing T-Scale Score : 40.85    ASSESSMENT:  Assessment: Patient progressing toward established goals. Activity Tolerance:  Patient tolerance of  treatment: good.       Equipment Recommendations:Equipment Needed: No  Discharge Recommendations: 24 hour assistance or supervision and Home with Home Health PT  Plan: Current Treatment Recommendations: Strengthening, Balance training, Functional mobility training, Gait training, Transfer training, Endurance training, Safety education & training, Therapeutic activities  General Plan:  (5x GM)    Patient Education  Patient Education: Plan of Care, Bed Mobility, Transfers, Gait, Verbal Exercise Instruction    Goals:  Patient Goals : be able to walk okay  Short Term Goals  Time Frame for Short Term Goals: by discharge  Short Term Goal 1: bed mobility with HOB flat, no rails, mod I for increased functional ind  Short Term Goal 2: sit<>Stand from various surfaces with LRD mod I for safe transfers  Short Term Goal 3: ambulate 48' with LRD mod I for safe household distances  Short Term Goal 4: pt to score a 11/28 on Tinetti to demonstrate a [address] and decreased fall risk  Long Term Goals  Time Frame for Long Term Goals : NA d/t short ELOS    Following session, patient left in safe position with all fall risk precautions in place.     Demi Soto ([place]) PT, DPT

## 2023-03-21 NOTE — FLOWSHEET NOTE
03/21/23 1449   Treatment Team Notification   Reason for Communication Review case   Team Member Name Dr Davin Biswas Team Role Resident   Method of Communication Secure Message   Response Waiting for response   Notification Time [phone]   Message sent per nursing communication from [place] with Cardiology asking if okay to start heparin since Eliquis on hold. Dr Jason Pacheco said to hold for now with procedures planned. MARQUITA Guerra notified.

## 2023-03-21 NOTE — PROGRESS NOTES
Educated patient regarding heart failure management by explaining the importance of obtaining daily weights at the same time every day with approximately the same amount of clothing, how to recognize symptoms, low sodium diet and taking prescribed medications as ordered. Patient was given our heart failure booklet. Patient was receptive to the education given and verbalized understanding. NEW patient appointment scheduled in CHF clinic.

## 2023-03-21 NOTE — PROGRESS NOTES
Physician Progress Note      Zac Wall  CSN #:                  [id number]  :                       1940  ADMIT DATE:       3/17/2023 1:56 PM  DISCH DATE:  Unique Aaron  PROVIDER #:        Love Montalvo          QUERY TEXT:    Patient admitted with pneumonia, noted to have permanent atrial fibrillation   and is maintained on Eliquis. If possible, please document in progress notes   and discharge summary if you are evaluating and/or treating any of the   following: The medical record reflects the following:    Risk Factors: 80 yo, hx of afib, CHF, HTN  Clinical Indicators: maintained on Eliquis, [id number] score 4  Treatment: bleeding precautions    Thank you! Sumi Castle CRCR  RN Clinical   Options provided:  -- Secondary hypercoagulable state in a patient with atrial fibrillation  -- Anticoagulation is prophylactic treatment only. -- Other - I will add my own diagnosis  -- Disagree - Not applicable / Not valid  -- Disagree - Clinically unable to determine / Unknown  -- Refer to Clinical Documentation Reviewer    PROVIDER RESPONSE TEXT:    Anticoagulation for Atrial Fibrillation    Query created by: Jimbo Thibodeaux on 3/20/2023 9:44 AM      QUERY TEXT:    Pt admitted with pneumonia and has HFpEF documented. If possible, please   document in progress notes and discharge summary further specificity regarding   the acuity of CHF:    The medical record reflects the following:    Risk Factors: hx of CHF, HLD, HTN  Clinical Indicators: Pro-BNP 3518.0, 3+ edema BLE, Echo 3/18/23- EF 50-55%,   CXR: mild cardiomegaly, Questionable tiny effusion left side posteriorly  Treatment: IV Bumex, Coreg, Crestor, Cardiology consult    Thank you!     Sumi Castle CRCR RN Clinical   Options provided:  -- Acute on Chronic Diastolic CHF/HFpEF  -- Chronic Diastolic CHF/HFpEF  -- Other - I will add my own diagnosis  -- Disagree - Not applicable / Not valid  -- Disagree - Clinically unable to determine / Unknown  -- Refer to Clinical Documentation Reviewer    PROVIDER RESPONSE TEXT:    This patient is in acute on chronic diastolic CHF/HFpEF.     Query created by: Victorino Landaverde on 3/20/2023 9:44 AM      Electronically signed by:  Lashanda Bowens 3/21/2023 11:48 AM

## 2023-03-21 NOTE — PROGRESS NOTES
Gildardo Rhoades 59  OCCUPATIONAL THERAPY MISSED TREATMENT NOTE  [facility] ONC MED 5K  5K-05/005-A      Date: 3/21/2023  Patient Name: Thierry Bishop        CSN: [id number]   : 1940  (79 y.o.)  Gender: male   Referring Practitioner: Dr. Krys Rubalcava MD  Diagnosis: Hypercalcemia         REASON FOR MISSED TREATMENT: Patient Refused. Pt stated he just received bad news from doctor and did not feel like completing therapy at this time.  Will check back later as able

## 2023-03-21 NOTE — TELEPHONE ENCOUNTER
received a note today saying patient was cleared by dentist to undergo the TAVR procedure. Reached out to office to have them sign the TAVR dental clearance form. Form faxed.

## 2023-03-21 NOTE — PLAN OF CARE
and/or skin breakdown  2. Assess vascular access sites hourly  3. Every 4-6 hours minimum:  Change oxygen saturation probe site  4. Every 4-6 hours:  If on nasal continuous positive airway pressure, respiratory therapy assess nares and determine need for appliance change or resting period. 3/21/2023 1107 by Pk Vanessa, LINDA  Outcome: Progressing  With stage 2 pressure injury on the coccyx area. No dressing over the injury. Kept monitored for any worsening. Patient able to turn self on bed. Assisted patient with ambulation to the commode. Problem: Respiratory - Adult  Goal: Clear lung sounds  3/21/2023 1107 by Pk Vanessa, RN  Outcome: Progressing  Patients oxygen saturation 98 % on room air oxygenation. No shortness of breath noted. Lung sounds clear and diminished, able C&DB as ordered. With noted occasional coughing episodes noted.

## 2023-03-21 NOTE — PROGRESS NOTES
3/21/23 at 1300 Spoke with [facility] Ayleen RN on [address] and informed her that Dr Valeria Coy approved the liver biopsy to be done tomorrow at 1400. Pt needs to be NPO 4 hours prior to the procedure and continue to hold blood thinners. Please release the signed and held orders on 3/22/23 at 0100. If any questions, please call IR at .

## 2023-03-21 NOTE — PLAN OF CARE
Problem: Safety - Adult  Goal: Free from fall injury  Outcome: Progressing     Problem: ABCDS Injury Assessment  Goal: Absence of physical injury  Outcome: Progressing     Problem: Chronic Conditions and Co-morbidities  Goal: Patient's chronic conditions and co-morbidity symptoms are monitored and maintained or improved  Outcome: Progressing     Problem: Pain  Goal: Verbalizes/displays adequate comfort level or baseline comfort level  Outcome: Progressing     Problem: Skin/Tissue Integrity  Goal: Absence of new skin breakdown  Description: 1. Monitor for areas of redness and/or skin breakdown  2. Assess vascular access sites hourly  3. Every 4-6 hours minimum:  Change oxygen saturation probe site  4. Every 4-6 hours:  If on nasal continuous positive airway pressure, respiratory therapy assess nares and determine need for appliance change or resting period. Outcome: Progressing   Care plan reviewed with patient. Patient verbalizes understanding of the plan of care and contributes to goal setting.

## 2023-03-21 NOTE — PROGRESS NOTES
72 hours. Urinalysis:      Lab Results   Component Value Date/Time    NITRU NEGATIVE 03/17/2023 06:47 PM    WBCUA 0-2 03/17/2023 06:47 PM    BACTERIA NONE SEEN 03/17/2023 06:47 PM    RBCUA 3-5 03/17/2023 06:47 PM    BLOODU NEGATIVE 03/17/2023 06:47 PM    SPECGRAV 1.010 04/13/2021 10:02 AM    GLUCOSEU NEGATIVE 03/17/2023 06:47 PM       All radiology images and reports reviewed and interpreted by me:  Radiology:   [facility]   Final Result      1. Preoperative TAVR measurements as listed above. 2. There are innumerable enhancing masses scattered throughout the hepatic parenchyma highly concerning for metastatic disease. 3. Large bilateral pleural effusions. Small amount of free fluid in the pelvis. 3. Haziness throughout the superficial subcutaneous soft tissues which may be related to anasarca. 4. Cholelithiasis. 5. Colonic diverticulosis. **This report has been created using voice recognition software. It may contain minor errors which are inherent in voice recognition technology. **      Final report electronically signed by Dr René Coles on 3/20/2023 8:35 PM      CTA CHEST W [address]   Final Result      1. Preoperative TAVR measurements as listed above. 2. There are innumerable enhancing masses scattered throughout the hepatic parenchyma highly concerning for metastatic disease. 3. Large bilateral pleural effusions. Small amount of free fluid in the pelvis. 3. Haziness throughout the superficial subcutaneous soft tissues which may be related to anasarca. 4. Cholelithiasis. 5. Colonic diverticulosis. **This report has been created using voice recognition software. It may contain minor errors which are inherent in voice recognition technology. **      Final report electronically signed by Dr René Coles on 3/20/2023 8:35 PM      VL DUP CAROTID BILATERAL   Final Result      1. RIGHT   ICA . Juliana Bottcher Juliana Bottcher  Moderate mixed plaque, mild should the procedure be performed? Right    (Results Pending)       Diet: ADULT DIET;  Regular; Low Fat/Low Chol/High Fiber/TRAVIS; Low Sodium (2 gm); 1500 ml; Gluten Free  ADULT ORAL NUTRITION SUPPLEMENT; Breakfast, Dinner; Standard High Calorie/High Protein Oral Supplement    DVT prophylaxis: [] Lovenox                                 [x] SCDs                                 [] SQ Heparin                                 [] Encourage ambulation           [] Already on Anticoagulation     Disposition:    [] Home       [] TCU       [] Rehab       [] Psych       [] SNF       [] [facility]       [x] Other- pending clinical course    Code Status: Full Code    PT/OT Eval Status: ordered      Electronically signed by Ap Kang DO on 3/21/2023 at 10:28 AM

## 2023-03-21 NOTE — PALLIATIVE CARE
self care; normal intake; fully conscious    [] 80%   Full ambulation; normal activity with effort; some evidence of disease; able to do own self care; normal or reduced intake; fully conscious    [] 70%  Ambulation reduced; unable to perform normal job/work; significant  disease; able to do own self care; normal or reduced intake; fully conscious      [] 60%  Ambulation reduced; Significant disease;Can't do hobbies/housework; intake normal or reduced; occasional assist; LOC full/confusion        [x] 50%  Mainly sit/lie; Extensive disease; Can't do any work; Considerable assist; intake normal or reduced; LOC full/confusion        [] 40%  Mainly in bed; Extensive disease; Mainly assist; intake normal or reduced; LOC full/confusion         [] 30%  Bed Bound; Extensive disease; Total care; intake reduced; LOC full/confusion        [] 20%  Bed Bound; Extensive disease; Total care; intake minimal; Drowsy/coma        [] 10%  Bed Bound; Extensive disease; Total care; Mouth care only; Drowsy/coma        [] 0  Death        Goals of care evaluation:-        The patient goals of care are to provide comfort care/supportive services/palliation & relieve suffering:  Goals of care discussed with:  [] Patient independently  [x] Patient and Family - pt's wife, Tico Lainez, is here   [] Family or Healthcare DPOA independently  [] Unable to discuss with patient, family/DPOA not present         Family/Patient Discussion:  Writer in to see pt, his wife Tico Lainez, is here. Pt is alert and oriented to all spheres. Pt and wife agreeable to discussion with this nurse. Condition update discussed, pt and his wife are able to accurately explain condition and plan of care. Pt and his wife express that they feel like \"tidal waves\" keep hitting them and they don't get a chance to \"come up for air\". Much time spent in supportive discussion, giving emotional support, encouragement and empathy.     Pt states that he know \"something\" was really wrong because he's lost 40 lbs in the past 2 months. We discussed not thinking \"too far\" ahead and try to take things day to day. Pt states that if biopsy comes back as cancer, then he will want to have a \"good heart to heart\" discussion with his doctors so he knows that to realistically expect. Pt's wife is very supportive of pt. Conversation concluded with pt and his wife stating no further questions or needs. Emotional support given. Pt's nurse,LINDA Sommers updated on above. Secure text sent to Dr Chloe Bowens to update. Plan/Follow-Up:  Continue with current plan of care at this time. Further goals of care discussions to be done once biopsy results are back. Biopsy is planned for tomorrow. Palliative care will continue to follow and assist with advance care planning as appropriate.          Electronically signed by Janeen Peterson RN on 3/21/2023 at [facility] Office: [phone]

## 2023-03-21 NOTE — CARE COORDINATION
3/21/23, 4:36 PM EDT    DISCHARGE ON [address] day: 4  Location: [facility]05/005-A Reason for admit: Hypercalcemia [E83.52]  Hypomagnesemia [E83.42]  Generalized weakness [R53.1]  Elevated troponin [R77.8]  Pneumonia due to infectious organism, unspecified laterality, unspecified part of lung [J18.9]  Acute congestive heart failure, unspecified heart failure type ([facility] [address] [I50.9]   Procedure: 3/22/2023 planned liver biopsy. Barriers to Discharge: Cardiology and CVS following, Palliative Care, PT/OT, CHF RN evaluation, IV Zithromax and Rocephin complete today, IV Bumex twice daily, prn Tylenol and Zofran, Magnesium and Potassium replacement protocol, daily BMP and CBC, Magnesium level in a.m., regular low sodium diet, oxygen, acapella, incentive spirometry, SCD's, strict I & O, up as tolerated. PCP: Joel Betts MD  Readmission Risk Score: 15.2%  Patient Goals/Plan/Treatment Preferences: Iris Albrecht is from home with his wife. He is current with outpatient therapy at [facility]. He will need clearance to return to outpatient therapy at discharge. Therapy recommending HH.

## 2023-03-22 ENCOUNTER — APPOINTMENT (OUTPATIENT)
Dept: PHYSICAL THERAPY | Age: 83
End: 2023-03-22
Payer: MEDICARE

## 2023-03-22 ENCOUNTER — APPOINTMENT (OUTPATIENT)
Dept: CT IMAGING | Age: 83
End: 2023-03-22
Payer: MEDICARE

## 2023-03-22 LAB
ANION GAP SERPL CALC-SCNC: 10 MEQ/L (ref 8–16)
BASOPHILS ABSOLUTE: 0.1 THOU/MM3 (ref 0–0.1)
BASOPHILS NFR BLD AUTO: 0.4 %
BUN SERPL-MCNC: 13 MG/DL (ref 7–22)
CALCIUM SERPL-MCNC: 10 MG/DL (ref 8.5–10.5)
CHLORIDE SERPL-SCNC: 103 MEQ/L (ref 98–111)
CO2 SERPL-SCNC: 26 MEQ/L (ref 23–33)
CREAT SERPL-MCNC: 0.5 MG/DL (ref 0.4–1.2)
DEPRECATED RDW RBC AUTO: 50.4 FL (ref 35–45)
EOSINOPHIL NFR BLD AUTO: 4.1 %
EOSINOPHILS ABSOLUTE: 0.6 THOU/MM3 (ref 0–0.4)
ERYTHROCYTE [DISTWIDTH] IN BLOOD BY AUTOMATED COUNT: 14.7 % (ref 11.5–14.5)
GFR SERPL CREATININE-BSD FRML MDRD: > 60 ML/MIN/1.73M2
GLUCOSE SERPL-MCNC: 110 MG/DL (ref 70–108)
HCT VFR BLD AUTO: 33.5 % (ref 42–52)
HEPARIN UNFRACTIONATED: 0.23 U/ML (ref 0.3–0.7)
HEPARIN UNFRACTIONATED: 0.39 U/ML (ref 0.3–0.7)
HEPARIN UNFRACTIONATED: < 0.04 U/ML (ref 0.3–0.7)
HGB BLD-MCNC: 10.9 GM/DL (ref 14–18)
IMM GRANULOCYTES # BLD AUTO: 0.09 THOU/MM3 (ref 0–0.07)
IMM GRANULOCYTES NFR BLD AUTO: 0.6 %
LYMPHOCYTES ABSOLUTE: 1.1 THOU/MM3 (ref 1–4.8)
LYMPHOCYTES NFR BLD AUTO: 7.1 %
MAGNESIUM SERPL-MCNC: 1.6 MG/DL (ref 1.6–2.4)
MCH RBC QN AUTO: 30.3 PG (ref 26–33)
MCHC RBC AUTO-ENTMCNC: 32.5 GM/DL (ref 32.2–35.5)
MCV RBC AUTO: 93.1 FL (ref 80–94)
MONOCYTES ABSOLUTE: 1.2 THOU/MM3 (ref 0.4–1.3)
MONOCYTES NFR BLD AUTO: 7.8 %
NEUTROPHILS NFR BLD AUTO: 80 %
NRBC BLD AUTO-RTO: 0 /100 WBC
PLATELET # BLD AUTO: 194 THOU/MM3 (ref 130–400)
PMV BLD AUTO: 9.1 FL (ref 9.4–12.4)
POTASSIUM SERPL-SCNC: 3.5 MEQ/L (ref 3.5–5.2)
RBC # BLD AUTO: 3.6 MILL/MM3 (ref 4.7–6.1)
SEGMENTED NEUTROPHILS ABSOLUTE COUNT: 12.4 THOU/MM3 (ref 1.8–7.7)
SODIUM SERPL-SCNC: 139 MEQ/L (ref 135–145)
WBC # BLD AUTO: 15.5 THOU/MM3 (ref 4.8–10.8)

## 2023-03-22 PROCEDURE — 47000 NEEDLE BIOPSY OF LIVER PERQ: CPT

## 2023-03-22 PROCEDURE — 97116 GAIT TRAINING THERAPY: CPT

## 2023-03-22 PROCEDURE — 88307 TISSUE EXAM BY PATHOLOGIST: CPT

## 2023-03-22 PROCEDURE — 97535 SELF CARE MNGMENT TRAINING: CPT

## 2023-03-22 PROCEDURE — 94669 MECHANICAL CHEST WALL OSCILL: CPT

## 2023-03-22 PROCEDURE — 97530 THERAPEUTIC ACTIVITIES: CPT

## 2023-03-22 PROCEDURE — 88342 IMHCHEM/IMCYTCHM 1ST ANTB: CPT

## 2023-03-22 PROCEDURE — 88334 PATH CONSLTJ SURG CYTO XM EA: CPT

## 2023-03-22 PROCEDURE — 1200000000 HC SEMI PRIVATE

## 2023-03-22 PROCEDURE — 77012 CT SCAN FOR NEEDLE BIOPSY: CPT

## 2023-03-22 PROCEDURE — 6370000000 HC RX 637 (ALT 250 FOR IP): Performed by: RADIOLOGY

## 2023-03-22 PROCEDURE — 6360000002 HC RX W HCPCS: Performed by: RADIOLOGY

## 2023-03-22 PROCEDURE — 88341 IMHCHEM/IMCYTCHM EA ADD ANTB: CPT

## 2023-03-22 PROCEDURE — 88333 PATH CONSLTJ SURG CYTO XM 1: CPT

## 2023-03-22 PROCEDURE — 97110 THERAPEUTIC EXERCISES: CPT

## 2023-03-22 PROCEDURE — 2500000003 HC RX 250 WO HCPCS

## 2023-03-22 PROCEDURE — 6360000002 HC RX W HCPCS

## 2023-03-22 PROCEDURE — 80048 BASIC METABOLIC PNL TOTAL CA: CPT

## 2023-03-22 PROCEDURE — 85520 HEPARIN ASSAY: CPT

## 2023-03-22 PROCEDURE — 6360000002 HC RX W HCPCS: Performed by: HOSPITALIST

## 2023-03-22 PROCEDURE — 82105 ALPHA-FETOPROTEIN SERUM: CPT

## 2023-03-22 PROCEDURE — 36415 COLL VENOUS BLD VENIPUNCTURE: CPT

## 2023-03-22 PROCEDURE — 6370000000 HC RX 637 (ALT 250 FOR IP): Performed by: HOSPITALIST

## 2023-03-22 PROCEDURE — 85025 COMPLETE CBC W/AUTO DIFF WBC: CPT

## 2023-03-22 PROCEDURE — 97166 OT EVAL MOD COMPLEX 45 MIN: CPT

## 2023-03-22 PROCEDURE — 2580000003 HC RX 258: Performed by: HOSPITALIST

## 2023-03-22 PROCEDURE — 99232 SBSQ HOSP IP/OBS MODERATE 35: CPT | Performed by: INTERNAL MEDICINE

## 2023-03-22 PROCEDURE — 83735 ASSAY OF MAGNESIUM: CPT

## 2023-03-22 RX ORDER — BACITRACIN ZINC 500 [USP'U]/G
OINTMENT TOPICAL
Status: COMPLETED | OUTPATIENT
Start: 2023-03-22 | End: 2023-03-22

## 2023-03-22 RX ORDER — FENTANYL CITRATE 50 UG/ML
INJECTION, SOLUTION INTRAMUSCULAR; INTRAVENOUS
Status: COMPLETED | OUTPATIENT
Start: 2023-03-22 | End: 2023-03-22

## 2023-03-22 RX ORDER — MIDAZOLAM HYDROCHLORIDE 1 MG/ML
INJECTION INTRAMUSCULAR; INTRAVENOUS
Status: COMPLETED | OUTPATIENT
Start: 2023-03-22 | End: 2023-03-22

## 2023-03-22 RX ADMIN — MIDAZOLAM 0.5 MG: 1 INJECTION INTRAMUSCULAR; INTRAVENOUS at 14:14

## 2023-03-22 RX ADMIN — ROSUVASTATIN 5 MG: 10 TABLET, FILM COATED ORAL at 08:31

## 2023-03-22 RX ADMIN — BUMETANIDE 1 MG: 0.25 INJECTION INTRAMUSCULAR; INTRAVENOUS at 21:03

## 2023-03-22 RX ADMIN — MESALAMINE 800 MG: 400 CAPSULE, DELAYED RELEASE ORAL at 08:31

## 2023-03-22 RX ADMIN — SODIUM CHLORIDE, PRESERVATIVE FREE 10 ML: 5 INJECTION INTRAVENOUS at 21:03

## 2023-03-22 RX ADMIN — GEMFIBROZIL 600 MG: 600 TABLET ORAL at 06:18

## 2023-03-22 RX ADMIN — FINASTERIDE 5 MG: 5 TABLET, FILM COATED ORAL at 08:31

## 2023-03-22 RX ADMIN — HEPARIN SODIUM 2000 UNITS: 1000 INJECTION INTRAVENOUS; SUBCUTANEOUS at 01:31

## 2023-03-22 RX ADMIN — CARVEDILOL 25 MG: 25 TABLET, FILM COATED ORAL at 18:24

## 2023-03-22 RX ADMIN — BUMETANIDE 1 MG: 0.25 INJECTION INTRAMUSCULAR; INTRAVENOUS at 08:31

## 2023-03-22 RX ADMIN — CARVEDILOL 25 MG: 25 TABLET, FILM COATED ORAL at 08:31

## 2023-03-22 RX ADMIN — Medication 1 CAPSULE: at 18:24

## 2023-03-22 RX ADMIN — POTASSIUM CHLORIDE 10 MEQ: 7.46 INJECTION, SOLUTION INTRAVENOUS at 01:27

## 2023-03-22 RX ADMIN — MESALAMINE 800 MG: 400 CAPSULE, DELAYED RELEASE ORAL at 21:03

## 2023-03-22 RX ADMIN — POTASSIUM CHLORIDE 10 MEQ: 7.46 INJECTION, SOLUTION INTRAVENOUS at 02:32

## 2023-03-22 RX ADMIN — GEMFIBROZIL 600 MG: 600 TABLET ORAL at 18:24

## 2023-03-22 RX ADMIN — Medication 1 CAPSULE: at 08:31

## 2023-03-22 RX ADMIN — PANTOPRAZOLE SODIUM 40 MG: 40 TABLET, DELAYED RELEASE ORAL at 06:18

## 2023-03-22 RX ADMIN — SODIUM CHLORIDE, PRESERVATIVE FREE 10 ML: 5 INJECTION INTRAVENOUS at 08:31

## 2023-03-22 RX ADMIN — BACITRACIN ZINC 1 MG: 500 OINTMENT TOPICAL at 14:48

## 2023-03-22 RX ADMIN — FENTANYL CITRATE 25 MCG: 50 INJECTION, SOLUTION INTRAMUSCULAR; INTRAVENOUS at 14:14

## 2023-03-22 NOTE — PLAN OF CARE
Problem: Safety - Adult  Goal: Free from fall injury  3/21/2023 2227 by Alon Calle RN  Outcome: Progressing  3/21/2023 1107 by Pelon Rayo RN  Outcome: Progressing     Problem: ABCDS Injury Assessment  Goal: Absence of physical injury  3/21/2023 2227 by Alon Calle RN  Outcome: Progressing  3/21/2023 1107 by Pelon Rayo RN  Outcome: Progressing     Problem: Chronic Conditions and Co-morbidities  Goal: Patient's chronic conditions and co-morbidity symptoms are monitored and maintained or improved  3/21/2023 2227 by Alon Calle RN  Outcome: Progressing  3/21/2023 1107 by Pelon Rayo RN  Outcome: Progressing  Flowsheets (Taken 3/21/2023 0801)  Care Plan - Patient's Chronic Conditions and Co-Morbidity Symptoms are Monitored and Maintained or Improved:   Monitor and assess patient's chronic conditions and comorbid symptoms for stability, deterioration, or improvement   Collaborate with multidisciplinary team to address chronic and comorbid conditions and prevent exacerbation or deterioration     Problem: Pain  Goal: Verbalizes/displays adequate comfort level or baseline comfort level  3/21/2023 2227 by Alon Calle RN  Outcome: Progressing  3/21/2023 1107 by Pelon Rayo RN  Outcome: Progressing  Flowsheets (Taken 3/21/2023 0801)  Verbalizes/displays adequate comfort level or baseline comfort level:   Encourage patient to monitor pain and request assistance   Assess pain using appropriate pain scale   Implement non-pharmacological measures as appropriate and evaluate response     Problem: Respiratory - Adult  Goal: Clear lung sounds  3/21/2023 2227 by Alon Calle RN  Outcome: Progressing  3/21/2023 1107 by Pelon Rayo RN  Outcome: Progressing   Care plan reviewed with patient. Patient verbalizes understanding of the plan of care and contributes to goal setting.

## 2023-03-22 NOTE — H&P
[address] [place] [address]  Sedation/Analgesia History & Physical    Pt Name: Felix Paulson  MRN: [id number]  YOB: 1940  Provider Performing Procedure: Marie Love MD, MD  Primary Care Physician: Veda Almaraz MD    Formulation and discussion of sedation / procedure plans, risks, benefits, side effects and alternatives with patient and/or responsible adult completed. PRE-PROCEDURE   DNR-CCA/DNR-CC []Yes [x]No  Brief History/Pre-Procedure Diagnosis: Liver masses in need of biopsy          MEDICAL HISTORY  []CAD/Valve  []Liver Disease  []Lung Disease []Diabetes  []Hypertension []Renal Disease  []Additional information:       has a past medical history of Arthritis, Atrial fibrillation ([facility] [address] Atrial fibrillation ([facility] [address] CAD (coronary artery disease), CHF (congestive heart failure) ([facility] [address] Dyslipidemia, Enlarged prostate, GERD (gastroesophageal reflux disease), Hyperlipidemia, Hypertension, and [employer]onic dual pacemaker . SURGICAL HISTORY   has a past surgical history that includes Pacemaker insertion (2008); Bunionectomy (Left, 1990's); Pilonidal cyst drainage (1970); hernia repair (Right, 1960's); Cardiac catheterization; Cataract removal with implant (Left, 2015); Colonoscopy; Colonoscopy (2016); Upper gastrointestinal endoscopy (2016); and pacemaker placement.   Additional information:       ALLERGIES   Allergies as of 03/17/2023 - Fully Reviewed 03/17/2023   Allergen Reaction Noted    Latex Rash 06/09/2017    Gluten meal  06/05/2017    Vasotec [enalaprilat] Other (See Comments) 09/25/2014    Neosporin [neomycin-polymyxin-gramicidin] Rash 04/26/2014    Sulfa antibiotics Rash 04/26/2014    Voltaren [diclofenac sodium] Rash 06/17/2020     Additional information:       MEDICATIONS   Coumadin Use Last 5 Days [x]No []Yes  Antiplatelet drug therapy use last 5 days  [x]No []Yes  Other anticoagulant use last 5 days  []No [x]Yes    Current Facility-Administered Medications:     heparin (porcine) Mabel Vallecillo MD, 10 mL at 03/22/23 0831    sodium chloride flush 0.9 % injection 5-40 mL, 5-40 mL, IntraVENous, PRN, Troy Cormier MD    0.9 % sodium chloride infusion, , IntraVENous, PRN, Troy Cormier MD    ondansetron (ZOFRAN-ODT) disintegrating tablet 4 mg, 4 mg, Oral, Q8H PRN, 4 mg at 03/19/23 1311 **OR** ondansetron (ZOFRAN) injection 4 mg, 4 mg, IntraVENous, Q6H PRN, Troy Cormier MD    acetaminophen (TYLENOL) tablet 650 mg, 650 mg, Oral, Q6H PRN **OR** acetaminophen (TYLENOL) suppository 650 mg, 650 mg, Rectal, Q6H PRN, Troy Cormier MD  Prior to Admission medications    Medication Sig Start Date End Date Taking?  Authorizing Provider   Handicap Placard MISC by Does not apply route 5 years 3/6/23   Spenser Gee MD   rosuvastatin (CRESTOR) 5 MG tablet Take 1 tablet by mouth daily 2/23/23   Spenser Gee MD   hydroCHLOROthiazide (HYDRODIURIL) 25 MG tablet Take 1 tablet by mouth daily 2/23/23   Spenser Gee MD   gemfibrozil (LOPID) 600 MG tablet Take 1 tablet by mouth 2 times daily (before meals) 2/23/23   Spenser Gee MD   terazosin (HYTRIN) 5 MG capsule Take 1 capsule by mouth nightly 2/23/23   Spenser Gee MD   pantoprazole (PROTONIX) 40 MG tablet TAKE 1 TABLET BY MOUTH ONCE DAILY 2/3/23   Historical Provider, MD   carvedilol (COREG) 25 MG tablet Take 1 tablet by mouth 2 times daily (with meals) 9/19/22   Thomas Torres MD   captopril (CAPOTEN) 25 MG tablet Take 0.5 tablets by mouth 2 times daily 9/19/22   Thomas Torres MD   ELIQUIS 5 MG TABS tablet Take 1 tablet by mouth 2 times daily 9/19/22   Thomas Torres MD   baclofen (LIORESAL) 10 MG tablet Take 1 tablet by mouth 3 times daily as needed (muscle spasm) 8/22/22   Spenser Gee MD   finasteride (PROSCAR) 5 MG tablet Take 1 tablet by mouth daily 4/12/22 4/12/23  SID Solorio CNP   mesalamine (DELZICOL) 800 MG TBEC TBEC tablet Take 800 mg by mouth daily     Historical Provider, MD   Coenzyme Q10 (COQ10) 200

## 2023-03-22 NOTE — PROGRESS NOTES
& training, Therapeutic activities  General Plan:  (5x GM)    Patient Education  Patient Education: Plan of Care    Goals:  Patient Goals : be able to walk okay  Short Term Goals  Time Frame for Short Term Goals: by discharge  Short Term Goal 1: bed mobility with HOB flat, no rails, mod I for increased functional ind  Short Term Goal 2: sit<>Stand from various surfaces with LRD mod I for safe transfers  Short Term Goal 3: ambulate 48' with LRD mod I for safe household distances  Short Term Goal 4: pt to score a 11/28 on Tinetti to demonstrate a MDC and decreased fall risk  Long Term Goals  Time Frame for Long Term Goals : NA d/t short ELOS    Following session, patient left in safe position with all fall risk precautions in place.

## 2023-03-22 NOTE — CARE COORDINATION
DISCHARGE PLANNING EVALUATION  3/22/23, 9:19 AM EDT    Reason for Referral: HH vs outpatient therapy  Mental Status: alert and answers questions appropriately  Decision Making: self  Family/Social/Home Environment: patient lives at home with his wife, wife at bedside. They have two daughter that lives locally, wife reports they are coming up to the hospital today. Current Services including food security, transportation and housekeeping: no current services, no concerns  Current Equipment:  Payment Source:[facility] Medicare  Concerns or Barriers to Discharge: precert if SNF  Post-acute [facility]) provider list was provided to patient. Patient was informed of their freedom to choose [facility] provider. Discussed and offered to show the patient the relevant [facility] Providers quality and resource use measures on Medicare Compare web site via computer based on patient's goals of care and treatment preferences. Questions regarding selection process were answered. Teach Back Method used with Divya Oconnell regarding care plan and needs  Patient and wife verbalize understanding of the plan of care and contribute to goal setting. Patient goals, treatment preferences and discharge plan: SW met with pt and wife at bedside. Pt to have a biopsy today. Wife tearful. SW did discuss home health with pt and wife, provided list of agencies. SW let them know she would continue to follow for discharge needs and preferences.     Electronically signed by MICHELLE Sierra on 3/22/2023 at 9:19 AM

## 2023-03-22 NOTE — PLAN OF CARE
Problem: Safety - Adult  Goal: Free from fall injury  3/22/2023 1010 by Maurilio Nieto RN  Outcome: Progressing  Fall assessment completed. Patient using call light appropriately to call for assistance with ambulation to bathroom or the commode. Personal items within reach. Patient is also compliant with use of non-skid slippers. Problem: Chronic Conditions and Co-morbidities  Goal: Patient's chronic conditions and co-morbidity symptoms are monitored and maintained or improved  3/22/2023 1010 by Maurilio Nieto RN  Outcome: Progressing  Flowsheets (Taken 3/22/2023 0828)  Care Plan - Patient's Chronic Conditions and Co-Morbidity Symptoms are Monitored and Maintained or Improved:   Monitor and assess patient's chronic conditions and comorbid symptoms for stability, deterioration, or improvement   Collaborate with multidisciplinary team to address chronic and comorbid conditions and prevent exacerbation or deterioration  Patient to undergo liver biopsy. Patient and family agrees to future procedures to be ordered for the patient. Problem: Pain  Goal: Verbalizes/displays adequate comfort level or baseline comfort level  3/22/2023 1010 by Maurilio Nieto RN  Outcome: Progressing  Flowsheets (Taken 3/22/2023 0565)  Verbalizes/displays adequate comfort level or baseline comfort level:   Encourage patient to monitor pain and request assistance   Assess pain using appropriate pain scale   Implement non-pharmacological measures as appropriate and evaluate response  Pain Assessment: None - Denies Pain  Pain Level: 0       Is pain goal met at this time? Yes            Problem: Skin/Tissue Integrity  Goal: Absence of new skin breakdown  Description: 1. Monitor for areas of redness and/or skin breakdown  2. Assess vascular access sites hourly  3. Every 4-6 hours minimum:  Change oxygen saturation probe site  4.   Every 4-6 hours:  If on nasal continuous positive airway pressure, respiratory therapy assess nares and

## 2023-03-22 NOTE — PROGRESS NOTES
Formulation and discussion of sedation / procedure plans, risks, benefits, side effects and alternatives with patient and/or responsible adult completed. History and Physical reviewed and unchanged.     Electronically signed by Jett Pineda MD on 3/22/23 at 1:56 PM EDT

## 2023-03-22 NOTE — PLAN OF CARE
Problem: Discharge Planning  Goal: Discharge to home or other facility with appropriate resources  Outcome: Progressing   SW consult received.  See SW note 3/22/23

## 2023-03-22 NOTE — PROGRESS NOTES
PROGRESS NOTE      Patient:  Maria Ines Montez  Unit/Bed:5-05/005-A  YOB: 1940  MRN: [id number]   Acct: [de-identified]    PCP: Lashon Wise MD    Date of Admission: 3/17/2023 LOS: 5    Date of Evaluation:  3/22/2023    Assessment/Plan:    Severe aortic stenosis, symptomatic  Heart failure preserved ejection fraction  Peripheral edema  - Echo on 3/18 demonstrating EF 50 to 55%, transaortic velocity 3.9 m/s, mean gradient 38 mmHg, area 0.81 cm², mild aortic regurgitation  - 3+ pitting edema on bilateral lower extremities ongoing for last few weeks per patient, elevated BNP  - Cardiology consulted during stay, appreciate recommendations  - Increase Bumex 1 mg IV twice daily (3/20) -> Cont diuresing (3/22)  - Will need LHC and RHC once diuresis completed -> Holding Eliquis since 3/18 -> Okay to place on heparin during timeframe of liver biopsy and thoracentesis   - Cardiothoracic surgery consulted and TAVR work-up being done -> Likely work up as OP  - Strict I's/O's, daily weights, potassium greater than 4 and magnesium greater than 2, 2 L fluid restriction    Enhancing masses hepatic parenchyma concerning for metastatic disease:  - CTA abdomen/pelvis and CTA chest done for TAVR work-up on 3/20, noted the above  - Case discussed with family, would like to proceed with biopsy at this time  - Case discussed with IR, plans for procedure/biopsy 3/22 , is higher risk due to location of mass that will be biopsied -> Biopsy completed on 0/55 no complications   - Palliative care also consulted appreciate recommendations    Community-acquired pneumonia  Large bilateral pleural effusions  - CXR imaging on arrival demonstrated retrocardiac infiltrates  - Pneumonia panel positive for E. coli, staph and Moraxella  - WBC remains elevated/leukocytosis -> Afebrile, no increase O2 demands, CXR imaging stable  - Continue Zithromax/ceftriaxone for 7 days (started 3/17 and end 3/22)  - CTA chest on 3/20 demonstrated large symptoms. **This report has been created using voice recognition software. It may contain minor errors which are inherent in voice recognition technology. **      Final report electronically signed by DR Eva Schmidt on 3/17/2023 3:29 PM      XR CHEST (2 VW)   Final Result   1. Mild cardiomegaly. Permanent dual-chamber pacemaker. 2. Mild retrocardiac atelectasis/pneumonia. Questionable tiny effusion left side posteriorly. **This report has been created using voice recognition software. It may contain minor errors which are inherent in voice recognition technology. **      Final report electronically signed by Dr. Rehana Hanks on 3/17/2023 2:51 PM      CT NEEDLE BIOPSY LIVER PERCUTANEOUS    (Results Pending)   CT GUIDED NEEDLE PLACEMENT    (Results Pending)       Diet: Diet NPO Exceptions are: Ice Chips, Sips of Water with Meds    DVT prophylaxis: [] Lovenox                                 [x] SCDs                                 [] SQ Heparin                                 [] Encourage ambulation           [] Already on Anticoagulation     Disposition:    [] Home       [] TCU       [] Rehab       [] Psych       [] SNF       [] [place]       [x] Other- pending clinical course    Code Status: Full Code    PT/OT Eval Status: ordered      Electronically signed by Nelson Jackson DO on 3/22/2023 at 3:14 PM

## 2023-03-22 NOTE — PROGRESS NOTES
social and functional history, completion of standardized testing, formal and informal observation of tasks, assessment of data and development of plan of care and goals. Treatment time included skilled education and facilitation of tasks to increase safety and independence with ADL's for improved functional independence and quality of life. Discharge Recommendations:  Continue to assess pending progress, Patient would benefit from continued therapy after discharge    Patient Education:     Patient Education  Education Given To: Patient, Family  Education Provided: Role of Therapy, ADL Adaptive Strategies, Fall Prevention Strategies, Plan of Care, Transfer Training, Precautions  Education Provided Comments: importance of increasing activity  Barriers to Learning: Hearing    Equipment Recommendations:  Equipment Needed: Yes  Mobility Devices: ADL Assistive Devices    Plan:  Times Per Week: 5x  Times Per Day: Once a day  Current Treatment Recommendations: Strengthening, Balance training, Functional mobility training, Endurance training, Safety education & training, Neuromuscular re-education, Patient/Caregiver education & training, Equipment evaluation, education, & procurement, Self-Care / ADL. See long-term goal time frame for expected duration of plan of care. If no long-term goals established, a short length of stay is anticipated. Goals:  Patient goals : return home [facility]  Short Term Goals  Time Frame for Short Term Goals: by discharge  Short Term Goal 1: patient will tolerate 5 min functional standing with CGA to increase activity tolerance for grooming and toileting. Short Term Goal 2: patient will functionally ambulate to/from BR with SBA and 0-1 cues for safety and sequencing. Short Term Goal 3: patient will complete ADL routine with SBA and edu in energy conservation tech to increase ADL success.   Short Term Goal 4: patient will participate in moderate resistive UB exer to increase UB strength for functional transfers. Following session, patient left in safe position with all fall risk precautions in place.

## 2023-03-22 NOTE — PROGRESS NOTES
1340 Pt arrived to CT for liver mass biopsy. 1343 Procedure explained using teach back method. Pt states understanding. 56 Dr Fidel Díaz in to assess patient and explain procedure. 1400 This procedure has been fully reviewed with the patient and written informed consent has been obtained. 1402 Patient assisted to table in left side down lying position. Monitor attached to patient. Comfort ensured. 1404 Pre-procedure images obtained. Pathology called. 1415 Procedure begins. 1419 Pathology arrived. 1444 Surgigel mixture injected into needle tract by radiologist.   4379 Procedure complete. Samples obtained and given to the pathologist.   1446 Post-procedure images obtained. Dr Fidel Díaz would like another scan in 10 minutes. 0 Dr Fidel Díaz speaking with patient. 1456 Scan completed. 1457 Monitor removed. Patient assisted to bed in semi fowlers position. Comfort ensured. 1500 Patient transported to  in stable condition. 1501 Report called to [address] [facility] on [facility] [address].

## 2023-03-23 ENCOUNTER — APPOINTMENT (OUTPATIENT)
Dept: GENERAL RADIOLOGY | Age: 83
End: 2023-03-23
Payer: MEDICARE

## 2023-03-23 ENCOUNTER — APPOINTMENT (OUTPATIENT)
Dept: ULTRASOUND IMAGING | Age: 83
End: 2023-03-23
Payer: MEDICARE

## 2023-03-23 PROBLEM — E43 SEVERE MALNUTRITION (HCC): Status: ACTIVE | Noted: 2023-03-23

## 2023-03-23 LAB
AFP SERPL-MCNC: 2.4 UG/L
ANION GAP SERPL CALC-SCNC: 12 MEQ/L (ref 8–16)
BASOPHILS ABSOLUTE: 0.1 THOU/MM3 (ref 0–0.1)
BASOPHILS NFR BLD AUTO: 0.4 %
BUN SERPL-MCNC: 12 MG/DL (ref 7–22)
CALCIUM SERPL-MCNC: 10 MG/DL (ref 8.5–10.5)
CHLORIDE SERPL-SCNC: 99 MEQ/L (ref 98–111)
CO2 SERPL-SCNC: 27 MEQ/L (ref 23–33)
CREAT SERPL-MCNC: 0.4 MG/DL (ref 0.4–1.2)
DEPRECATED RDW RBC AUTO: 50.4 FL (ref 35–45)
EOSINOPHIL NFR BLD AUTO: 3.8 %
EOSINOPHILS ABSOLUTE: 0.6 THOU/MM3 (ref 0–0.4)
ERYTHROCYTE [DISTWIDTH] IN BLOOD BY AUTOMATED COUNT: 14.9 % (ref 11.5–14.5)
GFR SERPL CREATININE-BSD FRML MDRD: > 60 ML/MIN/1.73M2
GLUCOSE SERPL-MCNC: 88 MG/DL (ref 70–108)
HCT VFR BLD AUTO: 35.4 % (ref 42–52)
HEPARIN UNFRACTIONATED: 0.05 U/ML (ref 0.3–0.7)
HEPARIN UNFRACTIONATED: < 0.04 U/ML (ref 0.3–0.7)
HGB BLD-MCNC: 11.5 GM/DL (ref 14–18)
IMM GRANULOCYTES # BLD AUTO: 0.09 THOU/MM3 (ref 0–0.07)
IMM GRANULOCYTES NFR BLD AUTO: 0.6 %
LYMPHOCYTES ABSOLUTE: 1.1 THOU/MM3 (ref 1–4.8)
LYMPHOCYTES NFR BLD AUTO: 7.3 %
MCH RBC QN AUTO: 30.1 PG (ref 26–33)
MCHC RBC AUTO-ENTMCNC: 32.5 GM/DL (ref 32.2–35.5)
MCV RBC AUTO: 92.7 FL (ref 80–94)
MONOCYTES ABSOLUTE: 1 THOU/MM3 (ref 0.4–1.3)
MONOCYTES NFR BLD AUTO: 6.9 %
NEUTROPHILS NFR BLD AUTO: 81 %
NRBC BLD AUTO-RTO: 0 /100 WBC
PLATELET # BLD AUTO: 217 THOU/MM3 (ref 130–400)
PMV BLD AUTO: 9 FL (ref 9.4–12.4)
POTASSIUM SERPL-SCNC: 3.5 MEQ/L (ref 3.5–5.2)
RBC # BLD AUTO: 3.82 MILL/MM3 (ref 4.7–6.1)
SEGMENTED NEUTROPHILS ABSOLUTE COUNT: 12.2 THOU/MM3 (ref 1.8–7.7)
SODIUM SERPL-SCNC: 138 MEQ/L (ref 135–145)
WBC # BLD AUTO: 15 THOU/MM3 (ref 4.8–10.8)

## 2023-03-23 PROCEDURE — 80048 BASIC METABOLIC PNL TOTAL CA: CPT

## 2023-03-23 PROCEDURE — 85025 COMPLETE CBC W/AUTO DIFF WBC: CPT

## 2023-03-23 PROCEDURE — 99232 SBSQ HOSP IP/OBS MODERATE 35: CPT | Performed by: PHYSICIAN ASSISTANT

## 2023-03-23 PROCEDURE — 97110 THERAPEUTIC EXERCISES: CPT

## 2023-03-23 PROCEDURE — 2500000003 HC RX 250 WO HCPCS

## 2023-03-23 PROCEDURE — 71045 X-RAY EXAM CHEST 1 VIEW: CPT

## 2023-03-23 PROCEDURE — 94669 MECHANICAL CHEST WALL OSCILL: CPT

## 2023-03-23 PROCEDURE — 32555 ASPIRATE PLEURA W/ IMAGING: CPT

## 2023-03-23 PROCEDURE — 97535 SELF CARE MNGMENT TRAINING: CPT

## 2023-03-23 PROCEDURE — 2580000003 HC RX 258: Performed by: HOSPITALIST

## 2023-03-23 PROCEDURE — 36415 COLL VENOUS BLD VENIPUNCTURE: CPT

## 2023-03-23 PROCEDURE — 6370000000 HC RX 637 (ALT 250 FOR IP): Performed by: HOSPITALIST

## 2023-03-23 PROCEDURE — 1200000000 HC SEMI PRIVATE

## 2023-03-23 PROCEDURE — 85520 HEPARIN ASSAY: CPT

## 2023-03-23 PROCEDURE — 6370000000 HC RX 637 (ALT 250 FOR IP)

## 2023-03-23 PROCEDURE — 99232 SBSQ HOSP IP/OBS MODERATE 35: CPT | Performed by: INTERNAL MEDICINE

## 2023-03-23 RX ADMIN — BUMETANIDE 1 MG: 0.25 INJECTION INTRAMUSCULAR; INTRAVENOUS at 09:16

## 2023-03-23 RX ADMIN — Medication 1 CAPSULE: at 18:27

## 2023-03-23 RX ADMIN — SODIUM CHLORIDE, PRESERVATIVE FREE 10 ML: 5 INJECTION INTRAVENOUS at 21:35

## 2023-03-23 RX ADMIN — GEMFIBROZIL 600 MG: 600 TABLET ORAL at 06:21

## 2023-03-23 RX ADMIN — FINASTERIDE 5 MG: 5 TABLET, FILM COATED ORAL at 09:16

## 2023-03-23 RX ADMIN — MESALAMINE 800 MG: 400 CAPSULE, DELAYED RELEASE ORAL at 09:15

## 2023-03-23 RX ADMIN — Medication 1 CAPSULE: at 09:15

## 2023-03-23 RX ADMIN — APIXABAN 5 MG: 5 TABLET, FILM COATED ORAL at 21:35

## 2023-03-23 RX ADMIN — BUMETANIDE 1 MG: 0.25 INJECTION INTRAMUSCULAR; INTRAVENOUS at 21:35

## 2023-03-23 RX ADMIN — LOPERAMIDE HYDROCHLORIDE 2 MG: 2 CAPSULE ORAL at 12:43

## 2023-03-23 RX ADMIN — POTASSIUM CHLORIDE 40 MEQ: 1500 TABLET, EXTENDED RELEASE ORAL at 09:25

## 2023-03-23 RX ADMIN — GEMFIBROZIL 600 MG: 600 TABLET ORAL at 18:27

## 2023-03-23 RX ADMIN — CARVEDILOL 25 MG: 25 TABLET, FILM COATED ORAL at 18:27

## 2023-03-23 RX ADMIN — MESALAMINE 800 MG: 400 CAPSULE, DELAYED RELEASE ORAL at 21:36

## 2023-03-23 RX ADMIN — SODIUM CHLORIDE, PRESERVATIVE FREE 10 ML: 5 INJECTION INTRAVENOUS at 09:24

## 2023-03-23 RX ADMIN — ROSUVASTATIN 5 MG: 10 TABLET, FILM COATED ORAL at 09:16

## 2023-03-23 RX ADMIN — CARVEDILOL 25 MG: 25 TABLET, FILM COATED ORAL at 09:16

## 2023-03-23 RX ADMIN — PANTOPRAZOLE SODIUM 40 MG: 40 TABLET, DELAYED RELEASE ORAL at 06:21

## 2023-03-23 NOTE — PROGRESS NOTES
Comprehensive Nutrition Assessment    Type and Reason for Visit:  Initial, Positive Nutrition Screen, Wound    Nutrition Recommendations/Plan:   Recommend diet as tolerated - follows Gluten Free, vegetarian diet pta. Will send Ensure Enlive TID. Recommend MVI. Malnutrition Assessment:  Malnutrition Status:  Severe malnutrition (03/23/23 1033)    Context:  Acute Illness     Findings of the 6 clinical characteristics of malnutrition:  Energy Intake:  50% or less of estimated energy requirements for 5 or more days  Weight Loss:   (difficult to assess with edema; -9% in 5 months)     Body Fat Loss: Moderate body fat loss Orbital   Muscle Mass Loss: Moderate muscle mass loss Temples (temporalis), Clavicles (pectoralis & deltoids)  Fluid Accumulation:  Unable to assess     Strength:  Not Performed    Nutrition Assessment:     Pt. severely malnourished AEB criteria listed above. At risk for further nutritional compromise r/t increased nutrient needs for wound healing, admit with pneumonia, HF, hepatic mass concerning for metastatic disease and underlying medical condition (hx CAD, CHF, GERD, HLD). Nutrition Related Findings:      Wound Type: Stage II     Pt. Report/Treatments/Miscellaneous: pt. Seen w/ wife; reports poor appetite and intake past few months; states would eat 2 small meals/day - bowl of cereal; grilled cheese and consume ~ 2 Ensures/day); family reports pt. Eating poorly since treated for Shigella; reports weight loss as well; states that he doesn't tolerate Gluten and meats; does ok with eggs, dairy products  GI Status: 3 BMs noted past 24 hours  Pertinent Labs: 3/23: Glucose 88, BUN 12, Cr 0.4  Pertinent Meds: Bumex, Imodium, Culturelle, Zofran      Current Nutrition Intake & Therapies:          ADULT DIET;  Regular; Low Fat/Low Chol/High Fiber/TRAVIS; Low Sodium (2 gm); 1500 ml; Gluten Free, Vegetarian (Lacto-Ovo)  ADULT ORAL NUTRITION SUPPLEMENT; Breakfast, Dinner, Lunch; Standard High Calorie/High Protein Oral Supplement    Anthropometric Measures:  Height: 6' 1\" (185.4 cm)  Ideal Body Weight (IBW): 184 lbs (84 kg)    Admission Body Weight: 160 lb 3.2 oz (72.7 kg) (3/20 +2 edema)  Current Body Weight: 160 lb (72.6 kg) (3/23 +2 edema),      Current BMI (kg/m2): 21.1  Usual Body Weight:  (per pt 190# a couple of months ago; per EMR:7/20/20: 196# 10 oz, 10/12/22: 176#; 12/27/22: 158# 6.4 oz, 1/3/23: 164#, 2/23/23: 157# 3 oz)                       BMI Categories: Underweight (BMI less than 22) age over 72    Estimated Daily Nutrient Needs:  Energy Requirements Based On: Kcal/kg  Weight Used for Energy Requirements: Other (Comment) (73)  Energy (kcal/day): 3421-9989 kcals (25-30)  Weight Used for Protein Requirements: Other (Comment) (73)  Protein (g/day): 102+ grams (1.4+)       Nutrition Diagnosis:   Severe malnutrition related to inadequate protein-energy intake as evidenced by Criteria as identified in malnutrition assessment    Nutrition Interventions:   Food and/or Nutrient Delivery: Continue Current Diet, Start Oral Nutrition Supplement  Nutrition Education/Counseling: Education initiated (Encouraged po, good nutrition. Discussed appropriate use of ONS.)  Coordination of Nutrition Care: Continue to monitor while inpatient       Goals:     Goals: PO intake 75% or greater, by next RD assessment       Nutrition Monitoring and Evaluation:      Food/Nutrient Intake Outcomes: Diet Advancement/Tolerance, Food and Nutrient Intake, Supplement Intake  Physical Signs/Symptoms Outcomes: Biochemical Data, Chewing or Swallowing, GI Status, Fluid Status or Edema, Nutrition Focused Physical Findings, Skin, Weight    Discharge Planning:     Too soon to determine     Aleida Mascorro RD, LD  Contact: [phone]

## 2023-03-23 NOTE — CARE COORDINATION
3/23/23, 1:47 PM EDT    DISCHARGE ON [address] day: 6  Location: -05/005-A Reason for admit: Hypercalcemia [E83.52]  Hypomagnesemia [E83.42]  Generalized weakness [R53.1]  Elevated troponin [R77.8]  Pneumonia due to infectious organism, unspecified laterality, unspecified part of lung [J18.9]  Acute congestive heart failure, unspecified heart failure type ([facility] [address] [I50.9]   Procedure:   3/22/2023 liver biopsy. 3/23/23 L sided thoracentesis planned. Barriers to Discharge: Cardiology and [facility] following, Palliative Care, PT/OT. Plan left sided thoracentesis today with plans to restart eliquis after. IV bumex bid. Coreg. Zofran. PCP: Coleen Hamman, MD  Readmission Risk Score: 14.3%  Patient Goals/Plan/Treatment Preferences: Lulu Wolfe is from home with his wife. He is current with outpatient therapy at [facility]. He will need clearance to return to outpatient therapy at discharge. Therapy recommending HH.   He is open to [facility] and has a list.

## 2023-03-23 NOTE — PROGRESS NOTES
Physician Progress Note      Virgil Elias  [facility] #:                  [id number]  :                       1940  ADMIT DATE:       3/17/2023 1:56 PM  DISCH DATE:  RESPONDING  PROVIDER #: Ina Bumpers MD          QUERY TEXT:    Patient admitted with pneumonia. Noted to have poor appetite and weight loss. Dietician assessment with severe malnutrition diagnosis. If possible, please   document in progress notes and discharge summary if you are evaluating and /or   treating any of the following: The medical record reflects the following:    Risk Factors: weight loss, poor appetite, wound  Clinical Indicators: severe malnutrition per dietician per AND/APSEN   guidelines as evidenced by Energy Intake:  50% or less of estimated energy   requirements for 5 or more days, 9% weight loss in 5 months,  Moderate body   fat loss Orbital , Moderate muscle mass loss Temples (temporalis), Clavicles   (pectoralis & deltoids)  Treatment: Dietician consult & Ensure Enlive TID    ASPEN Criteria:    https://aspenjournals. onlinelibrary. vera. com/doi/full/10.1177/959127584982990  5    Thank you! Veronia Reddish, Delle [place], KINGSLEYR  RN Clinical   Options provided:  -- Severe protein calorie malnutrition  -- Other - I will add my own diagnosis  -- Disagree - Not applicable / Not valid  -- Disagree - Clinically unable to determine / Unknown  -- Refer to Clinical Documentation Reviewer    PROVIDER RESPONSE TEXT:    This patient has severe protein calorie malnutrition.     Query created by: Purnima Pretty on 3/23/2023 12:12 PM      Electronically signed by:  Ina Bumpers MD 3/23/2023 2:45 PM

## 2023-03-23 NOTE — PROGRESS NOTES
5-40 mL IntraVENous 2 times per day     PRN Meds: heparin (porcine), heparin (porcine), loperamide, baclofen, potassium chloride **OR** potassium alternative oral replacement **OR** potassium chloride, magnesium sulfate, sodium chloride flush, sodium chloride, ondansetron **OR** ondansetron, acetaminophen **OR** acetaminophen      Intake/Output Summary (Last 24 hours) at 3/23/2023 1203  Last data filed at 3/23/2023 1000  Gross per 24 hour   Intake 200 ml   Output 2400 ml   Net -2200 ml         Exam:  BP (!) 122/56   Pulse 61   Temp 98.6 °F (37 °C) (Oral)   Resp 18   Ht 6' 1\" (1.854 m)   Wt 160 lb (72.6 kg)   SpO2 94%   BMI 21.11 kg/m²     Physical Exam  Vitals reviewed. Constitutional:       Appearance: He is not ill-appearing. HENT:      Head: Normocephalic. Right Ear: External ear normal.      Left Ear: External ear normal.      Mouth/Throat:      Mouth: Mucous membranes are moist.   Eyes:      Conjunctiva/sclera: Conjunctivae normal.   Cardiovascular:      Rate and Rhythm: Normal rate and regular rhythm. Heart sounds: No murmur heard. No gallop. Comments: Heart sounds distant  Pulmonary:      Effort: Pulmonary effort is normal.      Breath sounds: No wheezing, rhonchi or rales. Abdominal:      General: Bowel sounds are normal. There is no distension. Palpations: Abdomen is soft. Tenderness: There is no abdominal tenderness. Musculoskeletal:      Right lower leg: Edema (2+ pitting edema) present. Left lower leg: Edema (1+ pitting edema) present. Skin:     General: Skin is warm. Neurological:      General: No focal deficit present. Mental Status: He is alert.    Psychiatric:         Mood and Affect: Mood normal.      All labs reviewed and interpreted by me:  Labs:   Recent Labs     03/21/23  1912 03/22/23  0043 03/23/23  0635   WBC 14.0* 15.5* 15.0*   HGB 10.8* 10.9* 11.5*   HCT 33.3* 33.5* 35.4*    194 217       Recent Labs     03/21/23  0451

## 2023-03-23 NOTE — PROGRESS NOTES
disorder noted    Medications:    bumetanide  1 mg IntraVENous BID    lactobacillus  1 capsule Oral BID WC    carvedilol  25 mg Oral BID WC    finasteride  5 mg Oral Daily    gemfibrozil  600 mg Oral BID AC    mesalamine  800 mg Oral BID    rosuvastatin  5 mg Oral Daily    pantoprazole  40 mg Oral Daily    sodium chloride flush  5-40 mL IntraVENous 2 times per day      [Held by provider] heparin (PORCINE) Infusion Stopped (03/22/23 0824)    sodium chloride       heparin (porcine), 4,000 Units, PRN  heparin (porcine), 2,000 Units, PRN  loperamide, 2 mg, 4x Daily PRN  baclofen, 10 mg, TID PRN  potassium chloride, 40 mEq, PRN   Or  potassium alternative oral replacement, 40 mEq, PRN   Or  potassium chloride, 10 mEq, PRN  magnesium sulfate, 2,000 mg, PRN  sodium chloride flush, 5-40 mL, PRN  sodium chloride, , PRN  ondansetron, 4 mg, Q8H PRN   Or  ondansetron, 4 mg, Q6H PRN  acetaminophen, 650 mg, Q6H PRN   Or  acetaminophen, 650 mg, Q6H PRN      Diagnostics:  TTE  Summary   Normal left ventricular size and systolic function. There were no regional wall motion abnormalities. Wall thickness was within normal limits. Ejection fraction was estimated at 50-55%. Left atrial size was moderately dilated. Right atrial size was severely dilated. The right ventricular size was normal with normal systolic function and   wall thickness. Device lead seen in the right heart. The aortic valve was trileaflet with increased thickness, reduced   mobility, and heavy cuspal calcification. DOPPLER: Transaortic velocity   was 3.9 m/s, mean gradient 38 mmHg, max gradient 61 mmHg, LINNEA 0.81 cm2,   suggestive of severe aortic stenosis. There was mild aortic regurgitation. There was mild-moderate mitral regurgitation. There was severe tricuspid regurgitation. Assuming RAP = 20 mmHg, the   estimated RVSP = 67 mmHg. IVC size is dilated with reduced respiratory phasic changes (CVP>~20   mmHg). Ascending aorta = 3.7 cm. 50-55 per TTE 3/18/23  Severe AS  severe TR  Permanent afib  Hx CHB s/p PPM  CAD, medical therapy recommended in past  HTN  Pilonidal cyst - attending following  Hepatic masses - s/p biopsy 3/22/23  Hypomagnesemia/hypokalemia - being replaced      Plan:    Daily I/o and weights  2 liter fluid restriction and 2gm sodium diet  Keep mag >2 and k >4  Cont diuresis - needs po upon dc  Thoracentesis scheduled for today  Daily BMP  Cont statin/BB  Home eliquis on hold - resume when ok with attending  Cont heparin - on hold for thoracentesis  CTS consulted - recommend TAVR over SAVR  Outpt f/up for TAVR w/up, will need diagnosis/prognosis of hepatic masses prior to TAVR  Will follow prn  F/up dr Marshall Anderson 2 weeks       Electronically signed by Tank Molina PA-C on 3/23/2023 at 11:59 AM

## 2023-03-23 NOTE — PLAN OF CARE
Problem: Safety - Adult  Goal: Free from fall injury  3/23/2023 1118 by Cindy Holloway RN  Outcome: Progressing    Fall assessment completed. Patient using call light appropriately to call for assistance with ambulation to bathroom. Personal items within reach. Patient is also compliant with use of non-skid slippers. Problem: ABCDS Injury Assessment  Goal: Absence of physical injury  3/23/2023 1118 by Cindy Holloway RN  Outcome: Progressing  No falls noted this shift. Patient ambulates with x1 staff assistance without difficulty. Family member at bedside, spent the day. Bed kept in low position. Safe environment maintained. Bedside table & call light in reach. Uses call light appropriately when needing assistance. Problem: Chronic Conditions and Co-morbidities  Goal: Patient's chronic conditions and co-morbidity symptoms are monitored and maintained or improved  3/23/2023 1118 by Cindy Holloway RN  Outcome: Progressing     Patient's chronic conditions and co-morbidity symptoms are monitored. Problem: Pain  Goal: Verbalizes/displays adequate comfort level or baseline comfort level  3/23/2023 1118 by Cindy Holloway RN  Outcome: Progressing  Patient states pain relief from PRN pain medications. Pain reassessed one hour post PRN pain medication given. Patient rates pain 0 on JOSE R 0-10 scale. Problem: Skin/Tissue Integrity  Goal: Absence of new skin breakdown  Description: 1. Monitor for areas of redness and/or skin breakdown  2. Assess vascular access sites hourly  3. Every 4-6 hours minimum:  Change oxygen saturation probe site  4. Every 4-6 hours:  If on nasal continuous positive airway pressure, respiratory therapy assess nares and determine need for appliance change or resting period. 3/23/2023 1118 by Cindy Holloway RN  Outcome: Progressing  No skin breakdown this shift. Patient being assisted with turning.  Patients states understanding of repositioning

## 2023-03-23 NOTE — PROGRESS NOTES
progress  Equipment Recommendations: Equipment Needed: Yes  Mobility Devices: ADL Assistive Devices  Plan: Times Per Week: 5x  Times Per Day: Once a day  Current Treatment Recommendations: Strengthening, Balance training, Functional mobility training, Endurance training, Safety education & training, Neuromuscular re-education, Patient/Caregiver education & training, Equipment evaluation, education, & procurement, Self-Care / ADL    Patient Education  Patient Education: ADL's, Home Exercise Program, and Importance of Increasing Activity    Goals  Short Term Goals  Time Frame for Short Term Goals: by discharge  Short Term Goal 1: patient will tolerate 5 min functional standing with CGA to increase activity tolerance for grooming and toileting. Short Term Goal 2: patient will functionally ambulate to/from BR with SBA and 0-1 cues for safety and sequencing. Short Term Goal 3: patient will complete ADL routine with SBA and edu in energy conservation tech to increase ADL success. Short Term Goal 4: patient will participate in moderate resistive UB exer to increase UB strength for functional transfers. Following session, patient left in safe position with all fall risk precautions in place.

## 2023-03-23 NOTE — PROGRESS NOTES
[address] [address]  INPATIENT PHYSICAL THERAPY  DAILY NOTE  [facility] ONC MED 5K - 5K-05/005-A    Time In: 2461  Time Out: 1350  Timed Code Treatment Minutes: 8 Minutes  Minutes: 8          Date: 3/23/2023  Patient Name: Awilda Coppola,  Gender:  male        MRN: [id number]  : 1940  (79 y.o.)     Referring Practitioner: Desmond Robins MD  Diagnosis: Hypercalcemia  Additional Pertinent Hx: 79 y.o. male who presents with complaints of generalized weakness. Patient reports that he has not been feeling well for the last couple weeks. Patient reports that he has had a cough that was initially dry now productive sometimes of pink or clear sputum. CV surgery recommnending TAVR d/t severe symptomatic aortic stenosis. --liver mass biopsy 3/22     Prior Level of Function:  Lives With: Spouse  Type of Home: House  Home Layout: One level  Home Access: Stairs to enter with rails  Entrance Stairs - Number of Steps: 1 threshold  Home Equipment: Karie , standard, Walker, 4 wheeled, Lift chair   Bathroom Shower/Tub: Walk-in shower  Bathroom Toilet: Handicap height  Bathroom Equipment: Grab bars in shower, Toilet raiser  Bathroom Accessibility: Accessible (has to park 4 w/w outside [facility])    [facility]  ADL Assistance: Independent (except assist for socks)  [address]: Independent  Homemaking Responsibilities: Yes  Ambulation Assistance: Independent  Transfer Assistance: Independent  Active : No  Additional Comments: patient used 4 w/w around home. spouse is in good health to assist as needed. Restrictions/Precautions:  Restrictions/Precautions: General Precautions, Fall Risk     SUBJECTIVE: Pt in bed upon arrival, and agrees to therapy. , pt reporting he is exhausted after yesterday and he has had a very busy tday today, pt requesting to stay in bed.      PAIN: R knee with mobility    Vitals: Vitals not assessed per clinical judgement, see nursing flowsheet    OBJECTIVE:    Exercise:  Patient was guided in 1 set(s) 10 reps of exercise to both lower extremities. General LE therex in semi fowlers. Cues for technique and AA for RLE due to weakness . Exercises were completed for increased independence with functional mobility. Functional Outcome Measures: Completed  -PAC Inpatient Mobility without Stair Climbing Raw Score : 14  AM-PAC Inpatient without Stair Climbing T-Scale Score : 40.85    ASSESSMENT:  Assessment: Patient progressing toward established goals. Activity Tolerance:  Patient tolerance of  treatment: fair. Pt demos decreased activity tolerance and endurance. Pt demos decreased strength in RLE compared to LLE, and decreased independence with mobility. Pt will benefit from cont PT at this time to ensure safety, to decrease the risk for falls and to return to OF.    Equipment Recommendations:Equipment Needed: No  Discharge Recommendations: Subacte/Skilled Nursing Facility  Plan: Current Treatment Recommendations: Strengthening, Balance training, Functional mobility training, Gait training, Transfer training, Endurance training, Safety education & training, Therapeutic activities  General Plan:  (5x GM)    Patient Education  Patient Education: Plan of Care, Verbal Exercise Instruction, Health Promotion and Wellness Education, activity pacing    Goals:  Patient Goals : be able to walk okay  Short Term Goals  Time Frame for Short Term Goals: by discharge  Short Term Goal 1: bed mobility with HOB flat, no rails, mod I for increased functional ind  Short Term Goal 2: sit<>Stand from various surfaces with LRD mod I for safe transfers  Short Term Goal 3: ambulate 48' with LRD mod I for safe household distances  Short Term Goal 4: pt to score a 11/28 on Tinetti to demonstrate a MDC and decreased fall risk  Long Term Goals  Time Frame for Long Term Goals : NA d/t short ELOS    Following session, patient left in safe position with all fall risk

## 2023-03-24 ENCOUNTER — TELEPHONE (OUTPATIENT)
Dept: SURGERY | Age: 83
End: 2023-03-24

## 2023-03-24 LAB
ANION GAP SERPL CALC-SCNC: 11 MEQ/L (ref 8–16)
BASOPHILS ABSOLUTE: 0.1 THOU/MM3 (ref 0–0.1)
BASOPHILS NFR BLD AUTO: 0.4 %
BUN SERPL-MCNC: 14 MG/DL (ref 7–22)
CALCIUM SERPL-MCNC: 10 MG/DL (ref 8.5–10.5)
CHLORIDE SERPL-SCNC: 98 MEQ/L (ref 98–111)
CO2 SERPL-SCNC: 25 MEQ/L (ref 23–33)
CREAT SERPL-MCNC: 0.5 MG/DL (ref 0.4–1.2)
DEPRECATED RDW RBC AUTO: 49.3 FL (ref 35–45)
EOSINOPHIL NFR BLD AUTO: 3.8 %
EOSINOPHILS ABSOLUTE: 0.6 THOU/MM3 (ref 0–0.4)
ERYTHROCYTE [DISTWIDTH] IN BLOOD BY AUTOMATED COUNT: 14.7 % (ref 11.5–14.5)
GFR SERPL CREATININE-BSD FRML MDRD: > 60 ML/MIN/1.73M2
GLUCOSE SERPL-MCNC: 91 MG/DL (ref 70–108)
HCT VFR BLD AUTO: 32.4 % (ref 42–52)
HGB BLD-MCNC: 10.8 GM/DL (ref 14–18)
IMM GRANULOCYTES # BLD AUTO: 0.07 THOU/MM3 (ref 0–0.07)
IMM GRANULOCYTES NFR BLD AUTO: 0.4 %
LYMPHOCYTES ABSOLUTE: 1.2 THOU/MM3 (ref 1–4.8)
LYMPHOCYTES NFR BLD AUTO: 7.7 %
MAGNESIUM SERPL-MCNC: 1.4 MG/DL (ref 1.6–2.4)
MCH RBC QN AUTO: 30.9 PG (ref 26–33)
MCHC RBC AUTO-ENTMCNC: 33.3 GM/DL (ref 32.2–35.5)
MCV RBC AUTO: 92.8 FL (ref 80–94)
MONOCYTES ABSOLUTE: 1.1 THOU/MM3 (ref 0.4–1.3)
MONOCYTES NFR BLD AUTO: 6.9 %
NEUTROPHILS NFR BLD AUTO: 80.8 %
NRBC BLD AUTO-RTO: 0 /100 WBC
PLATELET # BLD AUTO: 195 THOU/MM3 (ref 130–400)
PMV BLD AUTO: 9.2 FL (ref 9.4–12.4)
POTASSIUM SERPL-SCNC: 3.4 MEQ/L (ref 3.5–5.2)
RBC # BLD AUTO: 3.49 MILL/MM3 (ref 4.7–6.1)
SEGMENTED NEUTROPHILS ABSOLUTE COUNT: 13 THOU/MM3 (ref 1.8–7.7)
SODIUM SERPL-SCNC: 134 MEQ/L (ref 135–145)
WBC # BLD AUTO: 16.1 THOU/MM3 (ref 4.8–10.8)

## 2023-03-24 PROCEDURE — 6370000000 HC RX 637 (ALT 250 FOR IP): Performed by: HOSPITALIST

## 2023-03-24 PROCEDURE — 80048 BASIC METABOLIC PNL TOTAL CA: CPT

## 2023-03-24 PROCEDURE — 97110 THERAPEUTIC EXERCISES: CPT

## 2023-03-24 PROCEDURE — 2580000003 HC RX 258: Performed by: HOSPITALIST

## 2023-03-24 PROCEDURE — 94669 MECHANICAL CHEST WALL OSCILL: CPT

## 2023-03-24 PROCEDURE — 1200000000 HC SEMI PRIVATE

## 2023-03-24 PROCEDURE — 97530 THERAPEUTIC ACTIVITIES: CPT

## 2023-03-24 PROCEDURE — 83735 ASSAY OF MAGNESIUM: CPT

## 2023-03-24 PROCEDURE — 97116 GAIT TRAINING THERAPY: CPT

## 2023-03-24 PROCEDURE — 85025 COMPLETE CBC W/AUTO DIFF WBC: CPT

## 2023-03-24 PROCEDURE — 99232 SBSQ HOSP IP/OBS MODERATE 35: CPT | Performed by: INTERNAL MEDICINE

## 2023-03-24 PROCEDURE — 2500000003 HC RX 250 WO HCPCS

## 2023-03-24 PROCEDURE — 6370000000 HC RX 637 (ALT 250 FOR IP)

## 2023-03-24 PROCEDURE — 0W9B3ZZ DRAINAGE OF LEFT PLEURAL CAVITY, PERCUTANEOUS APPROACH: ICD-10-PCS | Performed by: RADIOLOGY

## 2023-03-24 PROCEDURE — 36415 COLL VENOUS BLD VENIPUNCTURE: CPT

## 2023-03-24 RX ADMIN — MESALAMINE 800 MG: 400 CAPSULE, DELAYED RELEASE ORAL at 20:42

## 2023-03-24 RX ADMIN — ROSUVASTATIN 5 MG: 10 TABLET, FILM COATED ORAL at 08:42

## 2023-03-24 RX ADMIN — CARVEDILOL 25 MG: 25 TABLET, FILM COATED ORAL at 17:06

## 2023-03-24 RX ADMIN — BUMETANIDE 1 MG: 0.25 INJECTION INTRAMUSCULAR; INTRAVENOUS at 20:40

## 2023-03-24 RX ADMIN — SODIUM CHLORIDE, PRESERVATIVE FREE 10 ML: 5 INJECTION INTRAVENOUS at 20:42

## 2023-03-24 RX ADMIN — CARVEDILOL 25 MG: 25 TABLET, FILM COATED ORAL at 08:42

## 2023-03-24 RX ADMIN — APIXABAN 5 MG: 5 TABLET, FILM COATED ORAL at 20:42

## 2023-03-24 RX ADMIN — MESALAMINE 800 MG: 400 CAPSULE, DELAYED RELEASE ORAL at 08:42

## 2023-03-24 RX ADMIN — GEMFIBROZIL 600 MG: 600 TABLET ORAL at 15:20

## 2023-03-24 RX ADMIN — BUMETANIDE 1 MG: 0.25 INJECTION INTRAMUSCULAR; INTRAVENOUS at 08:42

## 2023-03-24 RX ADMIN — GEMFIBROZIL 600 MG: 600 TABLET ORAL at 06:57

## 2023-03-24 RX ADMIN — Medication 1 CAPSULE: at 08:42

## 2023-03-24 RX ADMIN — Medication 1 CAPSULE: at 17:13

## 2023-03-24 RX ADMIN — APIXABAN 5 MG: 5 TABLET, FILM COATED ORAL at 08:42

## 2023-03-24 RX ADMIN — SODIUM CHLORIDE, PRESERVATIVE FREE 10 ML: 5 INJECTION INTRAVENOUS at 08:44

## 2023-03-24 RX ADMIN — POTASSIUM CHLORIDE 40 MEQ: 1500 TABLET, EXTENDED RELEASE ORAL at 08:42

## 2023-03-24 RX ADMIN — FINASTERIDE 5 MG: 5 TABLET, FILM COATED ORAL at 08:42

## 2023-03-24 RX ADMIN — LOPERAMIDE HYDROCHLORIDE 2 MG: 2 CAPSULE ORAL at 15:20

## 2023-03-24 RX ADMIN — PANTOPRAZOLE SODIUM 40 MG: 40 TABLET, DELAYED RELEASE ORAL at 06:57

## 2023-03-24 NOTE — PROGRESS NOTES
[facility]  INPATIENT PHYSICAL THERAPY  DAILY NOTE  STRZ ONC MED 5K - 5K-05/005-A    Time In: 1440  Time Out: 1504  Timed Code Treatment Minutes: 24 Minutes  Minutes: 24          Date: 3/24/2023  Patient Name: Jessica Thomson,  Gender:  male        MRN: [id number]  : 1940  (79 y.o.)     Referring Practitioner: Faviola Warner MD  Diagnosis: Hypercalcemia  Additional Pertinent Hx: 79 y.o. male who presents with complaints of generalized weakness. Patient reports that he has not been feeling well for the last couple weeks. Patient reports that he has had a cough that was initially dry now productive sometimes of pink or clear sputum. CV surgery recommnending TAVR d/t severe symptomatic aortic stenosis. --liver mass biopsy 3/22     Prior Level of Function:  Lives With: Spouse  Type of Home: House  Home Layout: One level  Home Access: Stairs to enter with rails  Entrance Stairs - Number of Steps: 1 threshold  Home Equipment: Laurey Niece, standard, Walker, 4 wheeled, Lift chair   Bathroom Shower/Tub: Walk-in shower  Bathroom Toilet: Handicap height  Bathroom Equipment: Grab bars in shower, Toilet raiser  Bathroom Accessibility: Accessible (has to park 4 w/w outside [facility])    [facility] Family  ADL Assistance: Independent (except assist for socks)  [address]: Independent  Homemaking Responsibilities: Yes  Ambulation Assistance: Independent  Transfer Assistance: Independent  Active : No  Additional Comments: patient used 4 w/w around home. spouse is in good health to assist as needed.     Restrictions/Precautions:  Restrictions/Precautions: General Precautions, Fall Risk     SUBJECTIVE: Pt in recliner upon arrival, and agrees to therapy, Pt pleasant and cooperative    PAIN: 0/10: denies    Vitals: Oxygen: 96% on room air  Heart Rate: 60    OBJECTIVE:  Bed Mobility:  Sit to Supine: Minimal Assistance, with head of bed flat, with rail, with verbal cues , with increased time for

## 2023-03-24 NOTE — PROGRESS NOTES
3/24/23 1800 Patient lying in semi fowlers position. Ambulated to commode. No change in condition. /55, primary nurse notified in hand off report. Call light in reach. Bed alarm on.

## 2023-03-24 NOTE — PROGRESS NOTES
3/24/23 1330 Patient sitting up in chair. Ambulated to commode. Call light in reach. Chair alarm on. Report obtained from primary nurse. Hourly assessment maintained.

## 2023-03-24 NOTE — PROGRESS NOTES
[facility]  Occupational Therapy  Daily Note  Time:   Time In: 6238  Time Out: 1056  Timed Code Treatment Minutes: 24 Minutes  Minutes: 24          Date: 3/24/2023  Patient Name: Michelle Hassan,   Gender: male      Room: [facility]005-A  MRN: [id number]  : 1940  (79 y.o.)  Referring Practitioner: Dr. Ericka Guido MD  Diagnosis: pneumonia due to infectious organism  Additional Pertinent Hx: Pt admitted with poor energy levels with worsening over he past few months. Pt shows symptoms of aortic stenosis. Pt is being treated for pneumonia and being assessed by cardiology for possible TAVR. Restrictions/Precautions:  Restrictions/Precautions: General Precautions, Fall Risk     SUBJECTIVE: Nurse [facility] ok'd session, In bed upon arrival, agreeable to OT session, family present    PAIN: 0/10:     Vitals: Vitals not assessed per clinical judgement, see nursing flowsheet    COGNITION: WFL    ADL:   No ADL's completed this session. .    BED MOBILITY:  Supine to Sit: Supervision HOB elevated, used bedrail    TRANSFERS:  Sit to Stand:  Minimal Assistance. Height of bed elevated  Stand to Sit: Contact Guard Assistance. Bedside chair    FUNCTIONAL MOBILITY:  Assistive Device: Rolling Walker  Assist Level:  Contact Guard Assistance. Distance: Around bed to bedside chair     ADDITIONAL ACTIVITIES:  Patient completed BUE strengthening exercises with skilled education on HEP: completed x10 reps x1 set with a yellow band in all joints and all planes in order to improve UE strength and activity tolerance required for BADL routine and toilet / shower transfers. Patient tolerated well, requiring minimal rest breaks. Patient also required minimal cues for technique.          ASSESSMENT:     Activity Tolerance:  Patient tolerance of  treatment: Good treatment tolerance      Discharge Recommendations: 24 hour assistance or supervision  Equipment Recommendations: Equipment Needed: Yes  Mobility Devices:

## 2023-03-24 NOTE — PLAN OF CARE
need for appliance change or resting period. Outcome: Progressing     Problem: Respiratory - Adult  Goal: Clear lung sounds  Outcome: Progressing     Problem: Discharge Planning  Goal: Discharge to home or other facility with appropriate resources  Outcome: Progressing  Flowsheets (Taken 3/23/2023 2115)  Discharge to home or other facility with appropriate resources:   Identify barriers to discharge with patient and caregiver   Arrange for needed discharge resources and transportation as appropriate   Identify discharge learning needs (meds, wound care, etc)     Problem: Nutrition Deficit:  Goal: Optimize nutritional status  Outcome: Progressing  Flowsheets (Taken 3/24/2023 0225)  Nutrient intake appropriate for improving, restoring, or maintaining nutritional needs:   Assess nutritional status and recommend course of action   Monitor oral intake, labs, and treatment plans   Recommend appropriate diets, oral nutritional supplements, and vitamin/mineral supplements   Care plan reviewed with patient. Patient verbalizes understanding of the plan of care and contributes to goal setting.

## 2023-03-24 NOTE — PROGRESS NOTES
3/24/23 1400 Patient sitting up in chair. Calm, cooperative and denies pain. Alert and oriented x3. Speech appropriate and clear throughout. Gluten free diet. Pupils equal, round and reactive to light. Pupil size 3mm down to 2mm with consensual response. Oral cavity moist and intact. Upper extremities pink, warm, and dry to touch bilaterally. INT located on L and R forearm, intact with no numbness or swelling bilaterally. Patient is negative for arm drift. Capillary refill < 3 seconds bilaterally. Skin turgor > 3 seconds bilaterally. Hand grasp strong and equal bilaterally. HR 61. Regular rhythm, strong amplitude 2+. Heart chidi chidi present. Regular breathing pattern, moderate depth, chest moved symmetrically. Lungs clear throughout. Patient denied SOB when ambulating to commode. Respirations 16. Abdomen soft and flat. External catheter intact with flow present. Bowel sounds active in all 4 quadrants. Patient has been having loose stools. Lower extremities pink, warm, dry to touch bilaterally. No numbness or tingling bilaterally. Edema 2+ present in R lower leg. Edema 1+ present in L lower leg. Pedal push strong and equal bilaterally. /60 SPO2 97% on room air. Call light in reach. Chair alarm on.

## 2023-03-24 NOTE — TELEPHONE ENCOUNTER
Reached out to pt's wife regarding cancelling pilonidal cystectomy with Dr. Aniket Christine since Perfecto Cohen has a lot currently going on with being admitted and possible TAVR as outpatient and workup for hepatic mass. Pt's wife in agreement. Pilonidal cystectomy scheduled 4/20 with DR. Vazquez cancelled at this time.

## 2023-03-24 NOTE — CARE COORDINATION
3/24/23, 12:07 PM EDT    DISCHARGE ON [address] day: 7  Location: -05/005-A Reason for admit: Hypercalcemia [E83.52]  Hypomagnesemia [E83.42]  Generalized weakness [R53.1]  Elevated troponin [R77.8]  Pneumonia due to infectious organism, unspecified laterality, unspecified part of lung [J18.9]  Acute congestive heart failure, unspecified heart failure type ([address] [I50.9]   Procedure:   3/21/2023 ultrasound guided right thoracentesis- 0.3 L fluid removed  3/22/2023 CT guided liver mass biopsy. Barriers to Discharge: Cardiology following as needed, PT/OT, SS, Dietician, Palliative Care, CHF RN, Eliquis resumed, IV Bumex twice daily, prn Tylenol and Zofran, Magnesium and Potassium replacement protocol, oxygen, acapella, incentive spirometry, SCD's, strict I & O, up as tolerated. PCP: Heather Juan MD  Readmission Risk Score: 14.4%  Patient Goals/Plan/Treatment Preferences: Peter Brock is from home with his wife. He was current with [facility]) outpatient therapy PTA. Planning for new HH vs. SNF.

## 2023-03-24 NOTE — PROGRESS NOTES
3/24/23 1600 Patient sitting up in chair. Hourly rounds maintained. Call light in reach. Bed alarm on.

## 2023-03-25 PROCEDURE — 1200000000 HC SEMI PRIVATE

## 2023-03-25 PROCEDURE — 6370000000 HC RX 637 (ALT 250 FOR IP)

## 2023-03-25 PROCEDURE — 94669 MECHANICAL CHEST WALL OSCILL: CPT

## 2023-03-25 PROCEDURE — 2500000003 HC RX 250 WO HCPCS

## 2023-03-25 PROCEDURE — 6370000000 HC RX 637 (ALT 250 FOR IP): Performed by: HOSPITALIST

## 2023-03-25 PROCEDURE — 2580000003 HC RX 258: Performed by: HOSPITALIST

## 2023-03-25 PROCEDURE — 99232 SBSQ HOSP IP/OBS MODERATE 35: CPT | Performed by: INTERNAL MEDICINE

## 2023-03-25 RX ADMIN — PANTOPRAZOLE SODIUM 40 MG: 40 TABLET, DELAYED RELEASE ORAL at 06:06

## 2023-03-25 RX ADMIN — BUMETANIDE 1 MG: 0.25 INJECTION INTRAMUSCULAR; INTRAVENOUS at 20:28

## 2023-03-25 RX ADMIN — MESALAMINE 800 MG: 400 CAPSULE, DELAYED RELEASE ORAL at 08:08

## 2023-03-25 RX ADMIN — ROSUVASTATIN 5 MG: 10 TABLET, FILM COATED ORAL at 08:08

## 2023-03-25 RX ADMIN — Medication 1 CAPSULE: at 08:08

## 2023-03-25 RX ADMIN — GEMFIBROZIL 600 MG: 600 TABLET ORAL at 06:06

## 2023-03-25 RX ADMIN — GEMFIBROZIL 600 MG: 600 TABLET ORAL at 17:09

## 2023-03-25 RX ADMIN — BUMETANIDE 1 MG: 0.25 INJECTION INTRAMUSCULAR; INTRAVENOUS at 08:08

## 2023-03-25 RX ADMIN — MESALAMINE 800 MG: 400 CAPSULE, DELAYED RELEASE ORAL at 20:28

## 2023-03-25 RX ADMIN — APIXABAN 5 MG: 5 TABLET, FILM COATED ORAL at 20:28

## 2023-03-25 RX ADMIN — CARVEDILOL 25 MG: 25 TABLET, FILM COATED ORAL at 17:07

## 2023-03-25 RX ADMIN — Medication 1 CAPSULE: at 17:06

## 2023-03-25 RX ADMIN — FINASTERIDE 5 MG: 5 TABLET, FILM COATED ORAL at 08:08

## 2023-03-25 RX ADMIN — CARVEDILOL 25 MG: 25 TABLET, FILM COATED ORAL at 08:08

## 2023-03-25 RX ADMIN — SODIUM CHLORIDE, PRESERVATIVE FREE 10 ML: 5 INJECTION INTRAVENOUS at 08:08

## 2023-03-25 RX ADMIN — APIXABAN 5 MG: 5 TABLET, FILM COATED ORAL at 08:08

## 2023-03-25 RX ADMIN — POTASSIUM CHLORIDE 40 MEQ: 1500 TABLET, EXTENDED RELEASE ORAL at 17:06

## 2023-03-25 RX ADMIN — SODIUM CHLORIDE, PRESERVATIVE FREE 10 ML: 5 INJECTION INTRAVENOUS at 20:28

## 2023-03-25 NOTE — PLAN OF CARE
Problem: Safety - Adult  Goal: Free from fall injury  Outcome: Progressing     Problem: ABCDS Injury Assessment  Goal: Absence of physical injury  Outcome: Progressing     Problem: Chronic Conditions and Co-morbidities  Goal: Patient's chronic conditions and co-morbidity symptoms are monitored and maintained or improved  Outcome: Progressing  Flowsheets (Taken 3/24/2023 2042)  Care Plan - Patient's Chronic Conditions and Co-Morbidity Symptoms are Monitored and Maintained or Improved: Monitor and assess patient's chronic conditions and comorbid symptoms for stability, deterioration, or improvement     Problem: Pain  Goal: Verbalizes/displays adequate comfort level or baseline comfort level  Outcome: Progressing  Flowsheets (Taken 3/24/2023 1936)  Verbalizes/displays adequate comfort level or baseline comfort level: Encourage patient to monitor pain and request assistance     Problem: Skin/Tissue Integrity  Goal: Absence of new skin breakdown  Description: 1. Monitor for areas of redness and/or skin breakdown  2. Assess vascular access sites hourly  3. Every 4-6 hours minimum:  Change oxygen saturation probe site  4. Every 4-6 hours:  If on nasal continuous positive airway pressure, respiratory therapy assess nares and determine need for appliance change or resting period. Outcome: Progressing     Problem: Respiratory - Adult  Goal: Clear lung sounds  Outcome: Progressing     Problem: Discharge Planning  Goal: Discharge to home or other facility with appropriate resources  Outcome: Progressing  Flowsheets (Taken 3/24/2023 2042)  Discharge to home or other facility with appropriate resources: Identify barriers to discharge with patient and caregiver     Problem: Nutrition Deficit:  Goal: Optimize nutritional status  Outcome: Progressing   Care plan reviewed with pt.

## 2023-03-25 NOTE — PROGRESS NOTES
Kaiser Espinoza on 3/20/2023 8:35 PM      CTA CHEST W WO CONTRAST   Final Result      1. Preoperative TAVR measurements as listed above. 2. There are innumerable enhancing masses scattered throughout the hepatic parenchyma highly concerning for metastatic disease. 3. Large bilateral pleural effusions. Small amount of free fluid in the pelvis. 3. Haziness throughout the superficial subcutaneous soft tissues which may be related to anasarca. 4. Cholelithiasis. 5. Colonic diverticulosis. **This report has been created using voice recognition software. It may contain minor errors which are inherent in voice recognition technology. **      Final report electronically signed by Dr Kaiser Espinoza on 3/20/2023 8:35 PM      VL DUP CAROTID BILATERAL   Final Result      1. RIGHT   ICA . Lucyann Push Lucyann Push Moderate mixed plaque, mild stenosis, less than 50%. ..    ECA. . Moderate calcific plaque, moderate stenosis. CCA. Unremarkable . Lucyann Push VERT Antegrade flow. ..      2. LEFT    ICA. .... Moderate mixed plaque, mild stenosis, less than 50%. .. ECA. .. Moderate mixed plaque, mild stenosis. CCA. .. Minimal soft plaque, no appreciable stenosis. Bethpage Mitten. Antegrade flow. **This report has been created using voice recognition software. It may contain minor errors which are inherent in voice recognition technology. **      Final report electronically signed by Dr. Lucio Rebolledo on 3/20/2023 1:27 PM      VL DUP LOWER EXTREMITY VENOUS BILATERAL   Final Result   1. No sonographic evidence of lower extremity DVT. **This report has been created using voice recognition software. It may contain minor errors which are inherent in voice recognition technology. **      Final report electronically signed by Dr. Mynor Wright on 3/18/2023 7:09 PM      CT HEAD WO CONTRAST   Final Result      1. Stable CT scan of the brain, no interval change since previous study dated 24th of November 2008.    2. Diffusion MRI scan of the brain may be helpful for better evaluation if the patient has persistent symptoms. **This report has been created using voice recognition software. It may contain minor errors which are inherent in voice recognition technology. **      Final report electronically signed by DR Janas Opitz on 3/17/2023 3:29 PM      XR CHEST (2 VW)   Final Result   1. Mild cardiomegaly. Permanent dual-chamber pacemaker. 2. Mild retrocardiac atelectasis/pneumonia. Questionable tiny effusion left side posteriorly. **This report has been created using voice recognition software. It may contain minor errors which are inherent in voice recognition technology. **      Final report electronically signed by Dr. Laurann Severe on 3/17/2023 2:51 PM          Diet: ADULT DIET;  Regular; Low Fat/Low Chol/High Fiber/TRAVIS; Low Sodium (2 gm); 1500 ml; Gluten Free, Vegetarian (Lacto-Ovo)  ADULT ORAL NUTRITION SUPPLEMENT; Breakfast, Dinner, Lunch; Standard High Calorie/High Protein Oral Supplement    DVT prophylaxis: [] Lovenox                                 [x] SCDs                                 [] SQ Heparin                                 [] Encourage ambulation           [] Already on Anticoagulation     Disposition:    [] Home       [] TCU       [] Rehab       [] Psych       [] SNF       [] [place]       [x] Other- pending clinical course    Code Status: Full Code    PT/OT Eval Status: ordered      Electronically signed by Katina Zelaya MD on 3/24/2023 at 8:03 PM

## 2023-03-25 NOTE — PROGRESS NOTES
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 this morning, I will place a consult to oncology on-call to review chart and to review if any available longitudinal medical care options for this metastatic malignancy, no malignant cells were noted within pleural effusion, finding of malignancy may disqualify the patient for any cardiac interventions regarding severe aortic stenosis    Severe generalized weakness, likely from metastatic malignancy and severe aortic stenosis patient unable to ambulate without assistance moving forward unlikely eligible for discharge to home secondary to fall risk.     Severe aortic stenosis, symptomatic  Heart failure preserved ejection fraction  Peripheral edema  - Echo on 3/18 demonstrating EF 50 to 55%, transaortic velocity 3.9 m/s, mean gradient 38 mmHg, area 0.81 cm², mild aortic regurgitation  - 3+ pitting edema on bilateral lower extremities ongoing for last few weeks per patient, elevated BNP  - Cardiology consulted during stay, appreciate recommendations  - Increase Bumex 1 mg IV twice daily (3/20) -> Cont diuresing (3/23)  -Case discussed with cardiology on 3/23 and plans to get diagnosis/prognosis of liver lesions, no cath for now and plans to follow as outpatient, okay to resume Eliquis when appropriate  - Cardiothoracic surgery consulted and TAVR work-up being done -> Likely work up as OP  - Strict I's/O's, daily weights, potassium greater than 4 and magnesium greater than 2, 2 L fluid restriction    Enhancing masses hepatic parenchyma concerning for metastatic disease:  - CTA abdomen/pelvis and CTA chest done recognition technology. **      Final report electronically signed by Dr. Fiona Olvera on 3/24/2023 1:43 PM      XR CHEST PA INSPIRATION 1 VW   Final Result   1. No pneumothorax following left-sided thoracentesis is seen. There is minimal residual pleural fluid at the left lung base            **This report has been created using voice recognition software. It may contain minor errors which are inherent in voice recognition technology. **      Final report electronically signed by Dr. Fiona Olvera on 3/23/2023 5:38 PM      CT NEEDLE BIOPSY LIVER PERCUTANEOUS   Final Result   Status post CT-guided liver biopsy. **This report has been created using voice recognition software. It may contain minor errors which are inherent in voice recognition technology. **      Final report electronically signed by Dr. Fiona Olvear on 3/22/2023 3:34 PM      CT GUIDED NEEDLE PLACEMENT   Final Result   Status post CT-guided liver biopsy. **This report has been created using voice recognition software. It may contain minor errors which are inherent in voice recognition technology. **      Final report electronically signed by Dr. Fiona Olvera on 3/22/2023 3:34 PM      US THORACENTESIS Which side should the procedure be performed? Right   Final Result      Successful ultrasound-guided thoracentesis with  0.3 L of fluid drained. **This report has been created using voice recognition software. It may contain minor errors which are inherent in voice recognition technology. **          Final report electronically signed by Dr Eleni Reeves on 3/21/2023 3:27 PM      XR CHEST 1 VIEW   Final Result   No pneumothorax following a right-sided thoracentesis. **This report has been created using voice recognition software. It may contain minor errors which are inherent in voice recognition technology. **      Final report electronically signed by Dr Eleni Reeves on 3/21/2023 3:24 PM      CTA ABDOMEN

## 2023-03-26 LAB
ALBUMIN SERPL BCG-MCNC: 3.6 G/DL (ref 3.5–5.1)
ALP SERPL-CCNC: 119 U/L (ref 38–126)
ALT SERPL W/O P-5'-P-CCNC: 10 U/L (ref 11–66)
ANION GAP SERPL CALC-SCNC: 13 MEQ/L (ref 8–16)
AST SERPL-CCNC: 46 U/L (ref 5–40)
BACTERIA SPEC ANAEROBE CULT: NORMAL
BACTERIA SPEC BFLD CULT: NORMAL
BILIRUB SERPL-MCNC: 0.7 MG/DL (ref 0.3–1.2)
BUN SERPL-MCNC: 22 MG/DL (ref 7–22)
CALCIUM SERPL-MCNC: 10.1 MG/DL (ref 8.5–10.5)
CHLORIDE SERPL-SCNC: 96 MEQ/L (ref 98–111)
CO2 SERPL-SCNC: 24 MEQ/L (ref 23–33)
CREAT SERPL-MCNC: 0.5 MG/DL (ref 0.4–1.2)
GFR SERPL CREATININE-BSD FRML MDRD: > 60 ML/MIN/1.73M2
GLUCOSE SERPL-MCNC: 136 MG/DL (ref 70–108)
GRAM STN SPEC: NORMAL
MAGNESIUM SERPL-MCNC: 1.4 MG/DL (ref 1.6–2.4)
POTASSIUM SERPL-SCNC: 4 MEQ/L (ref 3.5–5.2)
PROT SERPL-MCNC: 5.9 G/DL (ref 6.1–8)
SODIUM SERPL-SCNC: 133 MEQ/L (ref 135–145)

## 2023-03-26 PROCEDURE — 6360000002 HC RX W HCPCS: Performed by: HOSPITALIST

## 2023-03-26 PROCEDURE — 6370000000 HC RX 637 (ALT 250 FOR IP): Performed by: HOSPITALIST

## 2023-03-26 PROCEDURE — 83735 ASSAY OF MAGNESIUM: CPT

## 2023-03-26 PROCEDURE — 2500000003 HC RX 250 WO HCPCS

## 2023-03-26 PROCEDURE — 6370000000 HC RX 637 (ALT 250 FOR IP)

## 2023-03-26 PROCEDURE — 80053 COMPREHEN METABOLIC PANEL: CPT

## 2023-03-26 PROCEDURE — 2580000003 HC RX 258: Performed by: HOSPITALIST

## 2023-03-26 PROCEDURE — 36415 COLL VENOUS BLD VENIPUNCTURE: CPT

## 2023-03-26 PROCEDURE — 99232 SBSQ HOSP IP/OBS MODERATE 35: CPT | Performed by: INTERNAL MEDICINE

## 2023-03-26 PROCEDURE — 1200000000 HC SEMI PRIVATE

## 2023-03-26 RX ADMIN — ACETAMINOPHEN 650 MG: 325 TABLET ORAL at 08:28

## 2023-03-26 RX ADMIN — Medication 1 CAPSULE: at 08:29

## 2023-03-26 RX ADMIN — MESALAMINE 800 MG: 400 CAPSULE, DELAYED RELEASE ORAL at 08:28

## 2023-03-26 RX ADMIN — SODIUM CHLORIDE, PRESERVATIVE FREE 10 ML: 5 INJECTION INTRAVENOUS at 21:28

## 2023-03-26 RX ADMIN — GEMFIBROZIL 600 MG: 600 TABLET ORAL at 06:32

## 2023-03-26 RX ADMIN — MESALAMINE 800 MG: 400 CAPSULE, DELAYED RELEASE ORAL at 21:28

## 2023-03-26 RX ADMIN — CARVEDILOL 25 MG: 25 TABLET, FILM COATED ORAL at 17:14

## 2023-03-26 RX ADMIN — MAGNESIUM SULFATE HEPTAHYDRATE 2000 MG: 40 INJECTION, SOLUTION INTRAVENOUS at 15:08

## 2023-03-26 RX ADMIN — BUMETANIDE 1 MG: 0.25 INJECTION INTRAMUSCULAR; INTRAVENOUS at 21:28

## 2023-03-26 RX ADMIN — GEMFIBROZIL 600 MG: 600 TABLET ORAL at 17:14

## 2023-03-26 RX ADMIN — SODIUM CHLORIDE, PRESERVATIVE FREE 10 ML: 5 INJECTION INTRAVENOUS at 09:54

## 2023-03-26 RX ADMIN — APIXABAN 5 MG: 5 TABLET, FILM COATED ORAL at 08:28

## 2023-03-26 RX ADMIN — PANTOPRAZOLE SODIUM 40 MG: 40 TABLET, DELAYED RELEASE ORAL at 06:32

## 2023-03-26 RX ADMIN — BUMETANIDE 1 MG: 0.25 INJECTION INTRAMUSCULAR; INTRAVENOUS at 08:29

## 2023-03-26 RX ADMIN — APIXABAN 5 MG: 5 TABLET, FILM COATED ORAL at 21:28

## 2023-03-26 RX ADMIN — FINASTERIDE 5 MG: 5 TABLET, FILM COATED ORAL at 08:30

## 2023-03-26 RX ADMIN — Medication 1 CAPSULE: at 17:14

## 2023-03-26 RX ADMIN — ROSUVASTATIN 5 MG: 10 TABLET, FILM COATED ORAL at 08:29

## 2023-03-26 RX ADMIN — CARVEDILOL 25 MG: 25 TABLET, FILM COATED ORAL at 08:29

## 2023-03-26 ASSESSMENT — PAIN DESCRIPTION - LOCATION: LOCATION: BACK

## 2023-03-26 ASSESSMENT — PAIN SCALES - GENERAL: PAINLEVEL_OUTOF10: 5

## 2023-03-26 ASSESSMENT — PAIN DESCRIPTION - DESCRIPTORS: DESCRIPTORS: ACHING

## 2023-03-26 NOTE — PROGRESS NOTES
me:  Radiology:  US THORACENTESIS Which side should the procedure be performed? Left   Final Result   Status post left thoracentesis            **This report has been created using voice recognition software. It may contain minor errors which are inherent in voice recognition technology. **      Final report electronically signed by Dr. Linnea Bhatia on 3/24/2023 1:43 PM      XR CHEST PA INSPIRATION 1 VW   Final Result   1. No pneumothorax following left-sided thoracentesis is seen. There is minimal residual pleural fluid at the left lung base            **This report has been created using voice recognition software. It may contain minor errors which are inherent in voice recognition technology. **      Final report electronically signed by Dr. Linnea Bhatia on 3/23/2023 5:38 PM      CT NEEDLE BIOPSY LIVER PERCUTANEOUS   Final Result   Status post CT-guided liver biopsy. **This report has been created using voice recognition software. It may contain minor errors which are inherent in voice recognition technology. **      Final report electronically signed by Dr. Linnea Bhatia on 3/22/2023 3:34 PM      CT GUIDED NEEDLE PLACEMENT   Final Result   Status post CT-guided liver biopsy. **This report has been created using voice recognition software. It may contain minor errors which are inherent in voice recognition technology. **      Final report electronically signed by Dr. Linnea Bhatia on 3/22/2023 3:34 PM      US THORACENTESIS Which side should the procedure be performed? Right   Final Result      Successful ultrasound-guided thoracentesis with  0.3 L of fluid drained. **This report has been created using voice recognition software. It may contain minor errors which are inherent in voice recognition technology. **          Final report electronically signed by Dr Ron Gambino on 3/21/2023 3:27 PM      XR CHEST 1 VIEW   Final Result   No pneumothorax following a right-sided Moderate mixed plaque, mild stenosis, less than 50%. .. ECA. .. Moderate mixed plaque, mild stenosis. CCA. .. Minimal soft plaque, no appreciable stenosis. Yue Perches. Antegrade flow. **This report has been created using voice recognition software. It may contain minor errors which are inherent in voice recognition technology. **      Final report electronically signed by Dr. Pinky Harrison on 3/20/2023 1:27 PM      VL DUP LOWER EXTREMITY VENOUS BILATERAL   Final Result   1. No sonographic evidence of lower extremity DVT. **This report has been created using voice recognition software. It may contain minor errors which are inherent in voice recognition technology. **      Final report electronically signed by Dr. Valeri Altamirano on 3/18/2023 7:09 PM      CT HEAD WO CONTRAST   Final Result      1. Stable CT scan of the brain, no interval change since previous study dated 24th of November 2008. 2. Diffusion MRI scan of the brain may be helpful for better evaluation if the patient has persistent symptoms. **This report has been created using voice recognition software. It may contain minor errors which are inherent in voice recognition technology. **      Final report electronically signed by DR Davin Rasmussen on 3/17/2023 3:29 PM      XR CHEST (2 VW)   Final Result   1. Mild cardiomegaly. Permanent dual-chamber pacemaker. 2. Mild retrocardiac atelectasis/pneumonia. Questionable tiny effusion left side posteriorly. **This report has been created using voice recognition software. It may contain minor errors which are inherent in voice recognition technology. **      Final report electronically signed by Dr. Pinky Harrison on 3/17/2023 2:51 PM          Diet: ADULT DIET;  Regular; Low Fat/Low Chol/High Fiber/TRAVIS; Low Sodium (2 gm); 1500 ml; Gluten Free, Vegetarian (Lacto-Ovo)  ADULT ORAL NUTRITION SUPPLEMENT; Breakfast, Dinner, Lunch; Standard High Calorie/High Protein Oral Supplement    DVT

## 2023-03-26 NOTE — PLAN OF CARE
Problem: Safety - Adult  Goal: Free from fall injury  Outcome: Progressing     Problem: ABCDS Injury Assessment  Goal: Absence of physical injury  Outcome: Progressing     Problem: Chronic Conditions and Co-morbidities  Goal: Patient's chronic conditions and co-morbidity symptoms are monitored and maintained or improved  Outcome: Progressing  Flowsheets (Taken 3/25/2023 2024)  Care Plan - Patient's Chronic Conditions and Co-Morbidity Symptoms are Monitored and Maintained or Improved: Monitor and assess patient's chronic conditions and comorbid symptoms for stability, deterioration, or improvement     Problem: Pain  Goal: Verbalizes/displays adequate comfort level or baseline comfort level  Outcome: Progressing  Flowsheets (Taken 3/25/2023 1938)  Verbalizes/displays adequate comfort level or baseline comfort level: Encourage patient to monitor pain and request assistance     Problem: Skin/Tissue Integrity  Goal: Absence of new skin breakdown  Description: 1. Monitor for areas of redness and/or skin breakdown  2. Assess vascular access sites hourly  3. Every 4-6 hours minimum:  Change oxygen saturation probe site  4. Every 4-6 hours:  If on nasal continuous positive airway pressure, respiratory therapy assess nares and determine need for appliance change or resting period. Outcome: Progressing     Problem: Respiratory - Adult  Goal: Clear lung sounds  Outcome: Progressing     Problem: Discharge Planning  Goal: Discharge to home or other facility with appropriate resources  Outcome: Progressing  Flowsheets (Taken 3/25/2023 2024)  Discharge to home or other facility with appropriate resources: Identify barriers to discharge with patient and caregiver     Problem: Nutrition Deficit:  Goal: Optimize nutritional status  Outcome: Progressing   Care plan reviewed with pt.

## 2023-03-27 ENCOUNTER — HOSPITAL ENCOUNTER (OUTPATIENT)
Dept: PHYSICAL THERAPY | Age: 83
Setting detail: THERAPIES SERIES
End: 2023-03-27
Payer: MEDICARE

## 2023-03-27 LAB
ALBUMIN SERPL BCG-MCNC: 3.7 G/DL (ref 3.5–5.1)
ALP SERPL-CCNC: 119 U/L (ref 38–126)
ALT SERPL W/O P-5'-P-CCNC: 8 U/L (ref 11–66)
ANION GAP SERPL CALC-SCNC: 12 MEQ/L (ref 8–16)
AST SERPL-CCNC: 41 U/L (ref 5–40)
BASOPHILS ABSOLUTE: 0.1 THOU/MM3 (ref 0–0.1)
BASOPHILS NFR BLD AUTO: 0.6 %
BILIRUB SERPL-MCNC: 0.7 MG/DL (ref 0.3–1.2)
BUN SERPL-MCNC: 21 MG/DL (ref 7–22)
CA-I BLD ISE-SCNC: 1.28 MMOL/L (ref 1.12–1.32)
CALCIUM SERPL-MCNC: 10.4 MG/DL (ref 8.5–10.5)
CEA SERPL-MCNC: 4.1 NG/ML (ref 0–5)
CHLORIDE SERPL-SCNC: 95 MEQ/L (ref 98–111)
CO2 SERPL-SCNC: 25 MEQ/L (ref 23–33)
CREAT SERPL-MCNC: 0.5 MG/DL (ref 0.4–1.2)
DEPRECATED RDW RBC AUTO: 51.1 FL (ref 35–45)
EOSINOPHIL NFR BLD AUTO: 3.4 %
EOSINOPHILS ABSOLUTE: 0.5 THOU/MM3 (ref 0–0.4)
ERYTHROCYTE [DISTWIDTH] IN BLOOD BY AUTOMATED COUNT: 14.9 % (ref 11.5–14.5)
GFR SERPL CREATININE-BSD FRML MDRD: > 60 ML/MIN/1.73M2
GLUCOSE SERPL-MCNC: 89 MG/DL (ref 70–108)
HCT VFR BLD AUTO: 35.5 % (ref 42–52)
HGB BLD-MCNC: 11 GM/DL (ref 14–18)
IMM GRANULOCYTES # BLD AUTO: 0.07 THOU/MM3 (ref 0–0.07)
IMM GRANULOCYTES NFR BLD AUTO: 0.4 %
LYMPHOCYTES ABSOLUTE: 1.2 THOU/MM3 (ref 1–4.8)
LYMPHOCYTES NFR BLD AUTO: 7.6 %
MAGNESIUM SERPL-MCNC: 1.8 MG/DL (ref 1.6–2.4)
MCH RBC QN AUTO: 29.2 PG (ref 26–33)
MCHC RBC AUTO-ENTMCNC: 31 GM/DL (ref 32.2–35.5)
MCV RBC AUTO: 94.2 FL (ref 80–94)
MONOCYTES ABSOLUTE: 1.1 THOU/MM3 (ref 0.4–1.3)
MONOCYTES NFR BLD AUTO: 7.1 %
NEUTROPHILS NFR BLD AUTO: 80.9 %
NRBC BLD AUTO-RTO: 0 /100 WBC
PLATELET # BLD AUTO: 206 THOU/MM3 (ref 130–400)
PMV BLD AUTO: 9.5 FL (ref 9.4–12.4)
POTASSIUM SERPL-SCNC: 3.2 MEQ/L (ref 3.5–5.2)
PROT SERPL-MCNC: 6 G/DL (ref 6.1–8)
RBC # BLD AUTO: 3.77 MILL/MM3 (ref 4.7–6.1)
SEGMENTED NEUTROPHILS ABSOLUTE COUNT: 12.9 THOU/MM3 (ref 1.8–7.7)
SODIUM SERPL-SCNC: 132 MEQ/L (ref 135–145)
WBC # BLD AUTO: 16 THOU/MM3 (ref 4.8–10.8)

## 2023-03-27 PROCEDURE — 6370000000 HC RX 637 (ALT 250 FOR IP): Performed by: HOSPITALIST

## 2023-03-27 PROCEDURE — 36415 COLL VENOUS BLD VENIPUNCTURE: CPT

## 2023-03-27 PROCEDURE — 97110 THERAPEUTIC EXERCISES: CPT

## 2023-03-27 PROCEDURE — 82330 ASSAY OF CALCIUM: CPT

## 2023-03-27 PROCEDURE — 2580000003 HC RX 258: Performed by: HOSPITALIST

## 2023-03-27 PROCEDURE — 83735 ASSAY OF MAGNESIUM: CPT

## 2023-03-27 PROCEDURE — 82378 CARCINOEMBRYONIC ANTIGEN: CPT

## 2023-03-27 PROCEDURE — 1200000000 HC SEMI PRIVATE

## 2023-03-27 PROCEDURE — 85025 COMPLETE CBC W/AUTO DIFF WBC: CPT

## 2023-03-27 PROCEDURE — 80053 COMPREHEN METABOLIC PANEL: CPT

## 2023-03-27 PROCEDURE — 2500000003 HC RX 250 WO HCPCS

## 2023-03-27 PROCEDURE — 6370000000 HC RX 637 (ALT 250 FOR IP)

## 2023-03-27 PROCEDURE — 99232 SBSQ HOSP IP/OBS MODERATE 35: CPT | Performed by: INTERNAL MEDICINE

## 2023-03-27 PROCEDURE — 97535 SELF CARE MNGMENT TRAINING: CPT

## 2023-03-27 RX ORDER — MULTIVITAMIN WITH IRON
1 TABLET ORAL DAILY
Status: DISCONTINUED | OUTPATIENT
Start: 2023-03-27 | End: 2023-03-31 | Stop reason: HOSPADM

## 2023-03-27 RX ORDER — POTASSIUM CHLORIDE 20 MEQ/1
40 TABLET, EXTENDED RELEASE ORAL
Status: COMPLETED | OUTPATIENT
Start: 2023-03-27 | End: 2023-03-27

## 2023-03-27 RX ADMIN — PANTOPRAZOLE SODIUM 40 MG: 40 TABLET, DELAYED RELEASE ORAL at 06:12

## 2023-03-27 RX ADMIN — BUMETANIDE 1 MG: 0.25 INJECTION INTRAMUSCULAR; INTRAVENOUS at 08:52

## 2023-03-27 RX ADMIN — BUMETANIDE 1 MG: 0.25 INJECTION INTRAMUSCULAR; INTRAVENOUS at 20:41

## 2023-03-27 RX ADMIN — Medication 1 CAPSULE: at 18:06

## 2023-03-27 RX ADMIN — CARVEDILOL 25 MG: 25 TABLET, FILM COATED ORAL at 18:06

## 2023-03-27 RX ADMIN — MESALAMINE 800 MG: 400 CAPSULE, DELAYED RELEASE ORAL at 08:52

## 2023-03-27 RX ADMIN — GEMFIBROZIL 600 MG: 600 TABLET ORAL at 06:12

## 2023-03-27 RX ADMIN — SODIUM CHLORIDE, PRESERVATIVE FREE 10 ML: 5 INJECTION INTRAVENOUS at 20:41

## 2023-03-27 RX ADMIN — SODIUM CHLORIDE, PRESERVATIVE FREE 10 ML: 5 INJECTION INTRAVENOUS at 08:55

## 2023-03-27 RX ADMIN — POTASSIUM CHLORIDE 40 MEQ: 1500 TABLET, EXTENDED RELEASE ORAL at 18:06

## 2023-03-27 RX ADMIN — ACETAMINOPHEN 650 MG: 325 TABLET ORAL at 06:51

## 2023-03-27 RX ADMIN — ACETAMINOPHEN 650 MG: 325 TABLET ORAL at 11:29

## 2023-03-27 RX ADMIN — Medication 1 TABLET: at 18:05

## 2023-03-27 RX ADMIN — FINASTERIDE 5 MG: 5 TABLET, FILM COATED ORAL at 08:52

## 2023-03-27 RX ADMIN — Medication 1 CAPSULE: at 08:52

## 2023-03-27 RX ADMIN — APIXABAN 5 MG: 5 TABLET, FILM COATED ORAL at 08:52

## 2023-03-27 RX ADMIN — CARVEDILOL 25 MG: 25 TABLET, FILM COATED ORAL at 08:52

## 2023-03-27 RX ADMIN — ROSUVASTATIN 5 MG: 10 TABLET, FILM COATED ORAL at 08:52

## 2023-03-27 RX ADMIN — ACETAMINOPHEN 650 MG: 325 TABLET ORAL at 21:54

## 2023-03-27 RX ADMIN — APIXABAN 5 MG: 5 TABLET, FILM COATED ORAL at 20:41

## 2023-03-27 RX ADMIN — GEMFIBROZIL 600 MG: 600 TABLET ORAL at 18:06

## 2023-03-27 RX ADMIN — BACLOFEN 10 MG: 10 TABLET ORAL at 15:42

## 2023-03-27 RX ADMIN — MESALAMINE 800 MG: 400 CAPSULE, DELAYED RELEASE ORAL at 20:41

## 2023-03-27 ASSESSMENT — PAIN DESCRIPTION - ORIENTATION
ORIENTATION: LOWER;MID
ORIENTATION: LOWER

## 2023-03-27 ASSESSMENT — PAIN DESCRIPTION - DESCRIPTORS
DESCRIPTORS: DULL;ACHING
DESCRIPTORS: ACHING
DESCRIPTORS: ACHING
DESCRIPTORS: SPASM

## 2023-03-27 ASSESSMENT — PAIN DESCRIPTION - LOCATION
LOCATION: BACK

## 2023-03-27 ASSESSMENT — PAIN SCALES - GENERAL
PAINLEVEL_OUTOF10: 0
PAINLEVEL_OUTOF10: 3
PAINLEVEL_OUTOF10: 3
PAINLEVEL_OUTOF10: 4
PAINLEVEL_OUTOF10: 1
PAINLEVEL_OUTOF10: 3

## 2023-03-27 ASSESSMENT — PAIN - FUNCTIONAL ASSESSMENT
PAIN_FUNCTIONAL_ASSESSMENT: ACTIVITIES ARE NOT PREVENTED

## 2023-03-27 NOTE — CARE COORDINATION
3/27/23, 9:25 AM EDT    DISCHARGE PLANNING EVALUATION    SW met with pt this morning, wife at bedside. SW discussed discharge plans, last time SW discussed HH, SW did discuss SNF. Post-acute [facility]) provider list was provided to patient. Patient was informed of their freedom to choose [facility] provider. Discussed and offered to show the patient the relevant [facility] Providers quality and resource use measures on Medicare Compare web site via computer based on patient's goals of care and treatment preferences. Questions regarding selection process were answered. Pt and wife reports pt to have testing today and determine treatment plans from there. Wife reports for SNF first choice would be [facility] and 2nd choice is [facility]. 11:46 AM EDT  Call to [facility], spoke with Tyree Hill, referral made, will need to know treatment plans before they could consider accepting pt.

## 2023-03-27 NOTE — PROGRESS NOTES
[address]  Occupational Therapy  Daily Note  Time:   Time In: 1177  Time Out: 1009  Timed Code Treatment Minutes: 28 Minutes  Minutes: 28          Date: 3/27/2023  Patient Name: Loli Alvarenga,   Gender: male      Room: [facility]005-A  MRN: [id number]  : 1940  (79 y.o.)  Referring Practitioner: Dr. Alta Pastrana MD  Diagnosis: pneumonia due to infectious organism  Additional Pertinent Hx: Pt admitted with poor energy levels with worsening over he past few months. Pt shows symptoms of aortic stenosis. Pt is being treated for pneumonia and being assessed by cardiology for possible TAVR. Restrictions/Precautions:  Restrictions/Precautions: General Precautions, Fall Risk     SUBJECTIVE: Pt seated upright in bed upon arrival, agreeable to OT session. PAIN: Did not rate: Bottom- noted small open sore at coccyx, therapist applied EPC during session    Vitals: Vitals not assessed per clinical judgement, see nursing flowsheet    COGNITION: Slow Processing, Decreased Recall, Decreased Problem Solving, and Decreased Safety Awareness    ADL:   Upper Extremity Dressing: Minimal Assistance. To manage hospital gown. Footwear Management: Maximum Assistance. Donning socks onto B feet. Toileting: Maximum Assistance and X 1. Pt min incontinent of stool with MaxA for hygiene while standing at RW . BALANCE:  Sitting Balance:  Supervision. EOB  Standing Balance: Contact Guard Assistance. X2 minutes within ADL task    BED MOBILITY:  Supine to Sit: Minimal Assistance    Sit to Supine: Maximum Assistance Raising BLE onto bed. Scooting: Stand By Assistance EOB    TRANSFERS:  Sit to Stand:  Minimal Assistance, X 1.   Stand to Sit: Contact Guard Assistance, X 1. Comment: TO/from EOB, chair- mod cues for hand placement. FUNCTIONAL MOBILITY:  Assistive Device: Rolling Walker   Assist Level:  Contact Guard Assistance.    Distance:   Completed functional mobility within pt x2 trials completing one lap during each trial at slow pace, no LOB noted. Pt requires no cues for walker safety- lengthy seated rest break after trial of mobility, mod fatigue noted. ADDITIONAL ACTIVITIES:  Patient identified a personal goal to increase UB strength and improve overall endurance so they can complete their toilet & shower transfers; skilled edu on UE strengthening. Completed BUE exercises x10 reps x1 sets using mod resistance band in all joints/planes to increase strength and endurance required for ADLs. Pt required min rest break between each exercise and min v/c for proper technique. ASSESSMENT:     Activity Tolerance:  Patient tolerance of  treatment: Fair treatment tolerance, Limited by pain, and Limited by fatigue      Discharge Recommendations: 24 hour assistance or supervision and Patient would benefit from continued OT at discharge  Equipment Recommendations: Equipment Needed: Yes  Mobility Devices: ADL Assistive Devices  Plan: Times Per Week: 5x  Times Per Day: Once a day  Current Treatment Recommendations: Strengthening, Balance training, Functional mobility training, Endurance training, Safety education & training, Neuromuscular re-education, Patient/Caregiver education & training, Equipment evaluation, education, & procurement, Self-Care / ADL    Patient Education  Patient Education: ADLs, HEP, Safety with transfers and mobility. Goals  Short Term Goals  Time Frame for Short Term Goals: by discharge  Short Term Goal 1: patient will tolerate 5 min functional standing with CGA to increase activity tolerance for grooming and toileting. Short Term Goal 2: patient will functionally ambulate to/from BR with SBA and 0-1 cues for safety and sequencing. Short Term Goal 3: patient will complete ADL routine with SBA and edu in energy conservation tech to increase ADL success.   Short Term Goal 4: patient will participate in moderate resistive UB exer to increase UB strength for functional

## 2023-03-27 NOTE — PROGRESS NOTES
today for lift assistance and then family decided they would like him evaluated. Patient denies any chest pain, shortness of breath. Does report that he has significant lower extremity swelling from his knees downwards. He follows closely with cardiology and was take off of Norvasc however has had no improvement. \"     3/20/2023: Increase Bumex 1 mg IV twice daily. Monitor urine output. TAVR work-up to be done as outpatient. 3/21/2023: CTA imaging demonstrating possible metastatic disease to liver and large bilateral pleural effusions. IR to continue with biopsy on 3/22. Palliative care consulted     3/22/2023: Liver biopsy to be done, patient stable     3/23/2023: Left-sided thoracentesis to be done today, awaiting liver biopsy results     3/24/2023 biopsy results have been reviewed with the patient     3/25/2023 I have updated the patient's family regarding biopsy results the patient continues to report decreased appetite he is hemodynamically stable family request consult with oncology for medical options they are aware of metastatic disease and of diagnosis of poorly differentiated adenocarcinoma    3/26/2023 Metastatic malignancy biopsy confirmed invasive adenocarcinoma from liver biopsies I have updated t the patient's family regarding this diagnosis at 1000 hrs. this morning, I will place a consult to oncology on-call to review chart and to review if any available longitudinal medical care options for this metastatic malignancy, no malignant cells were noted within pleural effusion, finding of malignancy may disqualify the patient for any cardiac interventions regarding severe aortic stenosis. .. Severe generalized weakness, likely from metastatic malignancy and severe aortic stenosis patient unable to ambulate without assistance moving forward unlikely eligible for discharge to home secondary to fall risk. \"    3/27/2023 - Heme/Onc and Palliative care consulted given metastatic malignancy.  Completed hours.    Urinalysis:      Lab Results   Component Value Date/Time    NITRU NEGATIVE 03/17/2023 06:47 PM    WBCUA 0-2 03/17/2023 06:47 PM    BACTERIA NONE SEEN 03/17/2023 06:47 PM    RBCUA 3-5 03/17/2023 06:47 PM    BLOODU NEGATIVE 03/17/2023 06:47 PM    SPECGRAV 1.010 04/13/2021 10:02 AM    GLUCOSEU NEGATIVE 03/17/2023 06:47 PM       All radiology images and reports reviewed and interpreted by me:  Radiology:  Len Read Which side should the procedure be performed? Left   Final Result   Status post left thoracentesis            **This report has been created using voice recognition software. It may contain minor errors which are inherent in voice recognition technology. **      Final report electronically signed by Dr. Valeri Altamirano on 3/24/2023 1:43 PM      XR CHEST PA INSPIRATION 1 VW   Final Result   1. No pneumothorax following left-sided thoracentesis is seen. There is minimal residual pleural fluid at the left lung base            **This report has been created using voice recognition software. It may contain minor errors which are inherent in voice recognition technology. **      Final report electronically signed by Dr. Valeri Altamirano on 3/23/2023 5:38 PM      CT NEEDLE BIOPSY LIVER PERCUTANEOUS   Final Result   Status post CT-guided liver biopsy. **This report has been created using voice recognition software. It may contain minor errors which are inherent in voice recognition technology. **      Final report electronically signed by Dr. Valeri Altamirano on 3/22/2023 3:34 PM      CT GUIDED NEEDLE PLACEMENT   Final Result   Status post CT-guided liver biopsy. **This report has been created using voice recognition software. It may contain minor errors which are inherent in voice recognition technology. **      Final report electronically signed by Dr. Valeri Altamirano on 3/22/2023 3:34 PM      US THORACENTESIS Which side should the procedure be performed?  Right   Final Result      Successful

## 2023-03-27 NOTE — PROGRESS NOTES
Comprehensive Nutrition Assessment    Type and Reason for Visit:  Reassess    Nutrition Recommendations/Plan:   Continue Ensure Enlive TID. Liberalize diet to Gluten Free, Lacto-ovo vegetarian (discontinue cardiac restriction). Encouraged food from home. Consider appetite stimulant. Recommend MVI. Will monitor need for additional nutrition interventions. Malnutrition Assessment:  Malnutrition Status:  Severe malnutrition (03/23/23 1033)    Context:  Acute Illness     Findings of the 6 clinical characteristics of malnutrition:  Energy Intake:  50% or less of estimated energy requirements for 5 or more days  Weight Loss:   (-15.5% in 5 months per EMR)     Body Fat Loss: Moderate body fat loss Orbital   Muscle Mass Loss: Moderate muscle mass loss Temples (temporalis), Clavicles (pectoralis & deltoids)  Fluid Accumulation:  Unable to assess     Strength:  Not Performed    Nutrition Assessment:     At risk for nutritional compromise r/t severe malnutrition, increased nutrient needs for wound healing, admit with pneumonia, HF, new diagnosis liver cancer and underlying medical condition (hx CAD, CHF, GERD, HLD). Nutrition Related Findings:      Wound Type: Stage II     Pt. Report/Treatments/Miscellaneous: pt. Seen w/ wife; states appetite is maybe a little bit better; denies any trouble tolerating diet; consuming 1-50% of meals; wife brought in soup from home and gluten free macaroni cheese (doesn't tolerate Gluten)  GI Status: 2 BMs noted past 24 hours  Pertinent Labs: 3/27: Glucose 89, BUN 21, Cr 0.5, Potassium 3.2  Pertinent Meds: Bumex, Imodium, Lopid, Crestor, Zofran      Current Nutrition Intake & Therapies:    Average Meal Intake: 1-25%, 26-50%  Average Supplements Intake:  (reports acceptance)  ADULT ORAL NUTRITION SUPPLEMENT; Breakfast, Dinner, Lunch; Standard High Calorie/High Protein Oral Supplement  ADULT DIET;  Regular; 1500 ml; Gluten Free, Vegetarian (Lacto-Ovo)    Anthropometric Measures:  Height: 6' 1\" (185.4 cm)  Ideal Body Weight (IBW): 184 lbs (84 kg)    Admission Body Weight: 160 lb 3.2 oz (72.7 kg) (3/20 +2 edema)  Current Body Weight: 148 lb 11.2 oz (67.4 kg) (3/26 trace, +2 edema),      Current BMI (kg/m2): 19.6  Usual Body Weight:  (per pt 190# a couple of months ago; per EMR:7/20/20: 196# 10 oz, 10/12/22: 176#; 12/27/22: 158# 6.4 oz, 1/3/23: 164#, 2/23/23: 157# 3 oz)                       BMI Categories: Underweight (BMI less than 22) age over 72    Estimated Daily Nutrient Needs:  Energy Requirements Based On: Kcal/kg  Weight Used for Energy Requirements: Other (Comment) (73)  Energy (kcal/day): 4776-9963 kcals (25-30)  Weight Used for Protein Requirements: Other (Comment) (73)  Protein (g/day): 102+ grams (1.4+)    Nutrition Diagnosis:   Severe malnutrition related to inadequate protein-energy intake as evidenced by Criteria as identified in malnutrition assessment    Nutrition Interventions:   Food and/or Nutrient Delivery: Modify Current Diet, Continue Oral Nutrition Supplement  Nutrition Education/Counseling: Education initiated (Encouraged po, good nutrition.   Discussed appropriate use of ONS.)  Coordination of Nutrition Care: Continue to monitor while inpatient       Goals:  Previous Goal Met: Progressing toward Goal(s)  Goals: PO intake 75% or greater, by next RD assessment       Nutrition Monitoring and Evaluation:      Food/Nutrient Intake Outcomes: Diet Advancement/Tolerance, Food and Nutrient Intake, Supplement Intake  Physical Signs/Symptoms Outcomes: Biochemical Data, Chewing or Swallowing, GI Status, Fluid Status or Edema, Nutrition Focused Physical Findings, Skin, Weight    Discharge Planning:    Continue Oral Nutrition Supplement     Asad Sarmiento RD, LD  Contact: [phone]

## 2023-03-28 LAB
ALBUMIN SERPL BCG-MCNC: 3.6 G/DL (ref 3.5–5.1)
ALP SERPL-CCNC: 122 U/L (ref 38–126)
ALT SERPL W/O P-5'-P-CCNC: 8 U/L (ref 11–66)
ANION GAP SERPL CALC-SCNC: 11 MEQ/L (ref 8–16)
AST SERPL-CCNC: 39 U/L (ref 5–40)
BASOPHILS ABSOLUTE: 0.1 THOU/MM3 (ref 0–0.1)
BASOPHILS NFR BLD AUTO: 0.5 %
BILIRUB SERPL-MCNC: 0.8 MG/DL (ref 0.3–1.2)
BUN SERPL-MCNC: 22 MG/DL (ref 7–22)
CA-I BLD ISE-SCNC: 1.3 MMOL/L (ref 1.12–1.32)
CALCIUM SERPL-MCNC: 10.4 MG/DL (ref 8.5–10.5)
CHLORIDE SERPL-SCNC: 97 MEQ/L (ref 98–111)
CO2 SERPL-SCNC: 26 MEQ/L (ref 23–33)
CREAT SERPL-MCNC: 0.5 MG/DL (ref 0.4–1.2)
DEPRECATED RDW RBC AUTO: 50.6 FL (ref 35–45)
EOSINOPHIL NFR BLD AUTO: 3.6 %
EOSINOPHILS ABSOLUTE: 0.6 THOU/MM3 (ref 0–0.4)
ERYTHROCYTE [DISTWIDTH] IN BLOOD BY AUTOMATED COUNT: 14.9 % (ref 11.5–14.5)
GFR SERPL CREATININE-BSD FRML MDRD: > 60 ML/MIN/1.73M2
GLUCOSE SERPL-MCNC: 101 MG/DL (ref 70–108)
HCT VFR BLD AUTO: 33.8 % (ref 42–52)
HGB BLD-MCNC: 11 GM/DL (ref 14–18)
IMM GRANULOCYTES # BLD AUTO: 0.07 THOU/MM3 (ref 0–0.07)
IMM GRANULOCYTES NFR BLD AUTO: 0.4 %
LYMPHOCYTES ABSOLUTE: 1.1 THOU/MM3 (ref 1–4.8)
LYMPHOCYTES NFR BLD AUTO: 7.3 %
MAGNESIUM SERPL-MCNC: 1.7 MG/DL (ref 1.6–2.4)
MCH RBC QN AUTO: 30.4 PG (ref 26–33)
MCHC RBC AUTO-ENTMCNC: 32.5 GM/DL (ref 32.2–35.5)
MCV RBC AUTO: 93.4 FL (ref 80–94)
MONOCYTES ABSOLUTE: 1.2 THOU/MM3 (ref 0.4–1.3)
MONOCYTES NFR BLD AUTO: 7.7 %
NEUTROPHILS NFR BLD AUTO: 80.5 %
NRBC BLD AUTO-RTO: 0 /100 WBC
PLATELET # BLD AUTO: 204 THOU/MM3 (ref 130–400)
PMV BLD AUTO: 9.6 FL (ref 9.4–12.4)
POTASSIUM SERPL-SCNC: 3.6 MEQ/L (ref 3.5–5.2)
PROT SERPL-MCNC: 6.5 G/DL (ref 6.1–8)
PSA SERPL-MCNC: 0.27 NG/ML (ref 0–1)
RBC # BLD AUTO: 3.62 MILL/MM3 (ref 4.7–6.1)
SEGMENTED NEUTROPHILS ABSOLUTE COUNT: 12.6 THOU/MM3 (ref 1.8–7.7)
SODIUM SERPL-SCNC: 134 MEQ/L (ref 135–145)
WBC # BLD AUTO: 15.7 THOU/MM3 (ref 4.8–10.8)

## 2023-03-28 PROCEDURE — 99232 SBSQ HOSP IP/OBS MODERATE 35: CPT | Performed by: INTERNAL MEDICINE

## 2023-03-28 PROCEDURE — 97530 THERAPEUTIC ACTIVITIES: CPT

## 2023-03-28 PROCEDURE — 1200000000 HC SEMI PRIVATE

## 2023-03-28 PROCEDURE — 6370000000 HC RX 637 (ALT 250 FOR IP): Performed by: HOSPITALIST

## 2023-03-28 PROCEDURE — 83735 ASSAY OF MAGNESIUM: CPT

## 2023-03-28 PROCEDURE — 82105 ALPHA-FETOPROTEIN SERUM: CPT

## 2023-03-28 PROCEDURE — 85025 COMPLETE CBC W/AUTO DIFF WBC: CPT

## 2023-03-28 PROCEDURE — 82330 ASSAY OF CALCIUM: CPT

## 2023-03-28 PROCEDURE — 80053 COMPREHEN METABOLIC PANEL: CPT

## 2023-03-28 PROCEDURE — 6370000000 HC RX 637 (ALT 250 FOR IP)

## 2023-03-28 PROCEDURE — 2500000003 HC RX 250 WO HCPCS

## 2023-03-28 PROCEDURE — 36415 COLL VENOUS BLD VENIPUNCTURE: CPT

## 2023-03-28 PROCEDURE — 2580000003 HC RX 258: Performed by: HOSPITALIST

## 2023-03-28 PROCEDURE — 84153 ASSAY OF PSA TOTAL: CPT

## 2023-03-28 RX ORDER — POTASSIUM CHLORIDE 20 MEQ/1
40 TABLET, EXTENDED RELEASE ORAL
Status: COMPLETED | OUTPATIENT
Start: 2023-03-28 | End: 2023-03-28

## 2023-03-28 RX ADMIN — CARVEDILOL 25 MG: 25 TABLET, FILM COATED ORAL at 15:45

## 2023-03-28 RX ADMIN — APIXABAN 5 MG: 5 TABLET, FILM COATED ORAL at 09:19

## 2023-03-28 RX ADMIN — BUMETANIDE 1 MG: 0.25 INJECTION INTRAMUSCULAR; INTRAVENOUS at 22:08

## 2023-03-28 RX ADMIN — POTASSIUM CHLORIDE 40 MEQ: 1500 TABLET, EXTENDED RELEASE ORAL at 18:04

## 2023-03-28 RX ADMIN — Medication 1 TABLET: at 09:19

## 2023-03-28 RX ADMIN — FINASTERIDE 5 MG: 5 TABLET, FILM COATED ORAL at 09:19

## 2023-03-28 RX ADMIN — GEMFIBROZIL 600 MG: 600 TABLET ORAL at 15:45

## 2023-03-28 RX ADMIN — ACETAMINOPHEN 650 MG: 325 TABLET ORAL at 22:50

## 2023-03-28 RX ADMIN — ROSUVASTATIN 5 MG: 10 TABLET, FILM COATED ORAL at 09:19

## 2023-03-28 RX ADMIN — PANTOPRAZOLE SODIUM 40 MG: 40 TABLET, DELAYED RELEASE ORAL at 06:14

## 2023-03-28 RX ADMIN — Medication 1 CAPSULE: at 15:45

## 2023-03-28 RX ADMIN — MESALAMINE 800 MG: 400 CAPSULE, DELAYED RELEASE ORAL at 22:07

## 2023-03-28 RX ADMIN — SODIUM CHLORIDE, PRESERVATIVE FREE 10 ML: 5 INJECTION INTRAVENOUS at 22:07

## 2023-03-28 RX ADMIN — BUMETANIDE 1 MG: 0.25 INJECTION INTRAMUSCULAR; INTRAVENOUS at 09:19

## 2023-03-28 RX ADMIN — APIXABAN 5 MG: 5 TABLET, FILM COATED ORAL at 22:07

## 2023-03-28 RX ADMIN — SODIUM CHLORIDE, PRESERVATIVE FREE 10 ML: 5 INJECTION INTRAVENOUS at 09:31

## 2023-03-28 RX ADMIN — CARVEDILOL 25 MG: 25 TABLET, FILM COATED ORAL at 09:19

## 2023-03-28 RX ADMIN — MESALAMINE 800 MG: 400 CAPSULE, DELAYED RELEASE ORAL at 09:19

## 2023-03-28 RX ADMIN — GEMFIBROZIL 600 MG: 600 TABLET ORAL at 06:35

## 2023-03-28 RX ADMIN — Medication 1 CAPSULE: at 09:19

## 2023-03-28 ASSESSMENT — PAIN DESCRIPTION - LOCATION: LOCATION: BACK

## 2023-03-28 ASSESSMENT — PAIN SCALES - GENERAL
PAINLEVEL_OUTOF10: 0
PAINLEVEL_OUTOF10: 1

## 2023-03-28 ASSESSMENT — PAIN DESCRIPTION - DESCRIPTORS: DESCRIPTORS: ACHING

## 2023-03-28 ASSESSMENT — PAIN - FUNCTIONAL ASSESSMENT: PAIN_FUNCTIONAL_ASSESSMENT: ACTIVITIES ARE NOT PREVENTED

## 2023-03-28 ASSESSMENT — PAIN DESCRIPTION - ORIENTATION: ORIENTATION: LOWER

## 2023-03-28 ASSESSMENT — PAIN SCALES - WONG BAKER
WONGBAKER_NUMERICALRESPONSE: 0
WONGBAKER_NUMERICALRESPONSE: 0

## 2023-03-28 NOTE — PROGRESS NOTES
[address]  Occupational Therapy  Daily Note  Time:   Time In: 521  Time Out: 643  Timed Code Treatment Minutes: 11 Minutes  Minutes: 11          Date: 3/28/2023  Patient Name: Felix Paulson,   Gender: male      Room: [facility]005-A  MRN: [id number]  : 1940  (79 y.o.)  Referring Practitioner: Dr. Doreen Delacruz MD  Diagnosis: pneumonia due to infectious organism  Additional Pertinent Hx: Pt admitted with poor energy levels with worsening over he past few months. Pt shows symptoms of aortic stenosis. Pt is being treated for pneumonia and being assessed by cardiology for possible TAVR. Restrictions/Precautions:  Restrictions/Precautions: General Precautions, Fall Risk     SUBJECTIVE: Nurse approved Tx. Pt agreeable to Tx. PAIN: reports bottom hurts, not rate pain     Vitals: Vitals not assessed per clinical judgement, see nursing flowsheet    COGNITION: Decreased Insight    ADL:   No ADL's completed this session. [facility] BALANCE:  Sitting Balance:  Stand By Assistance. Standing Balance: Contact Guard Assistance. BED MOBILITY:  Rolling to Right: Stand By Assistance, with head of bed raised, with rail increased time   Supine to Sit: Stand By Assistance, with head of bed raised, with rail increased time   Scooting: Stand By Assistance, with head of bed raised, with rail increased time     TRANSFERS:  Sit to Stand:  Contact Guard Assistance. Stand to Sit: Contact Guard Assistance. FUNCTIONAL MOBILITY:  Assistive Device: Rolling Walker  Assist Level:  Contact Guard Assistance. Distance:  around EOB to chair, pace slow. 0 LOB      ADDITIONAL ACTIVITIES:  Educated to get out of bed throughout day with staff and increase activity, pt reports the chair is uncomfortable and stays in bed most of the time.      ASSESSMENT:     Activity Tolerance:  Patient tolerance of  treatment: Fair treatment tolerance, Limited by pain, and Limited by fatigue       Discharge

## 2023-03-28 NOTE — PALLIATIVE CARE
Follow Up / Progress Note        Patient:   Marli Peguero  YOB: 1940  Age:  79 y.o. Room:  [facility]-  MRN:  [id number]            Family/Patient Discussion:  On unit to follow up with patient. Patient sitting up in chair, wife at bedside. Patient was able to speak with Dr Sameera Restrepo, but apparently did not really receive any information from him. Patient and family have requested second opinion from Dr Celeste Caicedo. Encouraged patient and wife to be very upfront with Dr Celeste Caicedo about what information they need to proceed with follow ups/treatments once he is discharged from the hospital. Patient stated that the plan is for him to go to rehab for a few weeks after he is discharged. Patient also informing this RN that he has had a very poor appetite, states he feels full very quickly. Encouraged patient to continue to work with therapy and attempt to eat meals. Discussed needing strength if he is wanting to move forward with any kind of treatment. Patient agreed to this RN returning tomorrow after he and his wife have been able to speak with Dr Celeste Caicedo. Plan/Follow-Up:  Plan to follow up tomorrow after patient and wife speak with Dr Celeste Caicedo.       Electronically signed by Alize Wallace RN on 3/28/2023 at 10:24 AM             Palliative Care Office: [phone]

## 2023-03-28 NOTE — PROGRESS NOTES
ONCOLOGY PROGRESS NOTE    Pt Name: Ramirez Martines  MRN: [id number]  YOB: 1940  Provider: Edmond Sommers MD, MD  Primary Care Physician: Eda Rodriguez MD    Patient seen and examined/24 hour events and chart reviewed. Feeling:  unchanged    Vitals  Vitals:    03/28/23 1530   BP: (!) 109/57   Pulse: 60   Resp:    Temp: 97.4 °F (36.3 °C)   SpO2: 97%       Physical Exam  General:  underweight, thin/frail  HEENT:  Head: Normocephalic, no lesions, without obvious abnormality. Mucosa:  moist  Neck:  supple   CVS:  regular rate and rhythm, S1, S2 normal, no murmur, click, rub or gallop     Lungs:  clear to auscultation bilaterally  Abd:  soft  Ext:  no cyanosis, clubbing or edema present    Skin:  Skin color, texture, turgor normal. No rashes or lesions.   Neuro:  Alert, oriented, thought content appropriate    Lab Results  CBC:   Lab Results   Component Value Date/Time    WBC 15.7 03/28/2023 05:02 AM    RBC 3.62 03/28/2023 05:02 AM    HGB 11.0 03/28/2023 05:02 AM    HCT 33.8 03/28/2023 05:02 AM    MCV 93.4 03/28/2023 05:02 AM    MCH 30.4 03/28/2023 05:02 AM    MCHC 32.5 03/28/2023 05:02 AM    RDW 14.3 04/17/2018 05:08 AM     03/28/2023 05:02 AM    MPV 9.6 03/28/2023 05:02 AM       Imaging  CT    Assessment   Patient Active Problem List:     Atrial fibrillation ([address]     ASHD (arteriosclerotic heart disease)     Cholelithiasis     Gluten enteropathy     Pacemaker battery depletion     Complete heart block (HCC)     Benign non-nodular prostatic hyperplasia without lower urinary tract symptoms     Lumbar degenerative disc disease     Kidney stone on right side     Right inguinal hernia     Diverticulosis of large intestine without hemorrhage     Essential hypertension     Personal history of calcium pyrophosphate deposition disease (CPPD)     Localized osteoarthritis of right knee     Medtronic dual pacemaker      Pneumonia due to infectious organism, unspecified laterality, unspecified part of lung     Generalized weakness     Severe malnutrition (HCC)  Metastatic Adenocarcinoma      Plan  Dr. Veronika Garrison consult noted. It is ok with me to follow   with Dr. Ivana Laws when the pt is discharged home. The AFP and CEA levels are within normal limits. Will continue the current management. Labs, cultures, and radiographs reviewed. Changes made as necessary. D/w Patient's R.N. and specific instructions given and issues addressed. Will follow the patient closely with you. Also please see the orders for updated patient care. Thank you for involving us in this patient's care. I will be discussing this case with the treating physicians. Please don't hesitate to call me if any questions, issues  or concerns    Please see orders for updated patient care orders written today.   JOSEFINA Peña,RANDOLPHS  3/28/2023   6:51 PM

## 2023-03-28 NOTE — PROGRESS NOTES
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 Cynthia Beck for 2nd opinion as per the pt and family request.  D/w pt and his RN about the current cl condition. Labs, cultures, and radiographs reviewed. Changes made as necessary. D/w Patient's R.N. and specific instructions given and issues addressed. Will follow the patient closely with you. Also please see the orders for updated patient care. Thank you for involving us in this patient's care. I will be discussing this case with the treating physicians. Please don't hesitate to call me if any questions, issues  or concerns    Please see orders for updated patient care orders written today.   JOSEFINA Mora,CWS  3/27/2023   9:36 PM

## 2023-03-28 NOTE — CONSULTS
liver demonstrates the markedly abnormal appearance with  these innumerable enhancing masses. Strangely enough these masses were  not identified on CT of the abdomen. Before he had a CT of the abdomen  back on 04/17/2018, there is a note that he may have cysts throughout  his liver. Few hypodense lesions in the liver, subcentimeter in size,  the largest one was 1.7 x 1.5 cm in the left lobe of the liver. These  were listed as cysts at that time. No liver mass or intrahepatic  biliary dilatation. No issues with the gallbladder and there were no  areas of adenopathy noted in that CT back in 2018. The CTA of the chest  on 03/20/2023 showed essentially large bilateral pleural effusions by  Light's criteria. These are actually transudative. They are not  exudative. He did have a thoracentesis showing that there were no  malignant cells identified in the pleural fluid. The patient just has  refractory CHF and he is elderly. The patient had numerous  subcentimeter prominent nodes in the mediastinum as well on this CT  angiogram, but there was no mass within the chest itself. I do not  think this is metastatic lung cancer that went to the abdomen, it may be  primary in the liver. Dr. Shabnam Maki had been consulted. The family wanted a second opinion  from me. Dr. Shabnam Maki has ordered a CEA. I will actually order an  alpha-fetoprotein because if this is hepatic cancer, that is usually the  tumor marker that is elevated, I will look to see if it is. The patient  does not have any jaundice and his liver enzymes are totally normal.  So  it would be unusual for an intrahepatic cholangiocarcinoma to be present  without any jaundice with such advanced disease and no abnormality of  any hepatic enzymes including alk phos. So I am not sure about that  diagnosis.   Nonetheless, the patient has stage IV malignancy,  nonsurgically resectable disease and has many comorbid conditions that  are already life the patient cannot have anything done inpatient  since he has a congestive heart failure that is active as well as a  pneumonia that is active and being treated. So he needs to be more  clinically stable to see me as an outpatient to determine if he is going  to be a candidate for any treatment plus I need NGF testing to target  therapy. He is not a candidate for any kind of chemotherapy that is  being administered IV. With his severe heart failure, I will put him  into congestive/pulmonary edema, congestive failure with any IV fluids  that I administer to him and I have to take that into consideration. He  looks more like a hospice candidate to me at this point. If you have any questions, please do not hesitate to contact me. I will  be calling the patient's room after hours today.         Radha Calabrese D.O.    D: 03/28/2023 8:07:00       T: 03/28/2023 9:30:18     [email or web address]  Job#: [id number]     Doc#: [id number]    CC:

## 2023-03-28 NOTE — CARE COORDINATION
3/28/23, 12:18 PM EDT    DISCHARGE ON [address] day: 11  Location: -05/005-A Reason for admit: Hypercalcemia [E83.52]  Hypomagnesemia [E83.42]  Generalized weakness [R53.1]  Elevated troponin [R77.8]  Pneumonia due to infectious organism, unspecified laterality, unspecified part of lung [J18.9]  Acute congestive heart failure, unspecified heart failure type ([facility] [address] [I50.9]   Procedure:   3/21/2023 ultrasound guided right thoracentesis- 0.3 L fluid removed  3/22/2023 CT guided liver mass biopsy  3/23/2023 ultrasound guided thoracentesis, 0.475 L fluid removed. Barriers to Discharge: Dr. Da Benson consulted for Oncologic second opinion, PT/OT,  Palliative Care, SS, Dietician, Eliquis, IV Bumex twice daily, prn Tylenol and Zofran, electrolyte replacement protocol, daily CBC, CMP, Magnesium and Ionized calcium, regular diet, daily weights, acapella, incentive spirometry, SCD's, up as tolerated. FULL code. PCP: Bridgett Hills MD  Readmission Risk Score: 15.1%  Patient Goals/Plan/Treatment Preferences: Britt Rincon is from home with his wife. New SNF planned at discharge.

## 2023-03-28 NOTE — PROGRESS NOTES
Prayer and encouragement    03/28/23 1458   Encounter Summary   Service Provided For: Patient and family together   Referral/Consult From: Rounding   Support System Spouse   Last Encounter  03/28/23  (Anointed)   Complexity of Encounter Low   Assessment/Intervention/Outcome   Assessment Calm   Intervention Empowerment

## 2023-03-28 NOTE — CONSULTS
[address] [place] [address]                                  CONSULTATION    PATIENT NAME: Cass Calabrese                    :        1940  MED REC NO:   [id number]                           ROOM:       0005  ACCOUNT NO:   [de-identified]                           ADMIT DATE: 2023  PROVIDER:     Judy Mabry M.D.    [facility]:  2023    MEDICAL ONCOLOGY CONSULT    REFERRING PROVIDER:  Madeline Phalen, MD    CONSULTING PROVIDER:  Judy Denise. Saud Mabry MD    INDICATIONS FOR CONSULTATION:  Patient with possible liver cancer. HISTORY OF PRESENT ILLNESS:  The patient is an 58-year-old pleasant  gentleman who presented on this admission with a history of generalized  weakness. The patient reported that he has not been feeling well for  the last two to three weeks. He had cough that was initially dry, now  productive sometimes of pink to clear sputum. The patient has a history  of poor appetite and he lost several pounds of weight in the last two to  three months. The patient states that he might have lost about 30  pounds of weight. No history of any fever. No history of any nausea or  vomiting. No history of any abdominal pain or diarrhea. The patient  went to the toilet and unable to get up from the toilet. Therefore, EMS  was called for assistance. Then the family decided  to take the pt   to the emergency room for further evaluation and management. The patient has a history of lower extremity swelling from his knees  downwards. He follows with Cardiology closely and takes Norvasc. The  patient is on Eliquis for blood thinners. The patient underwent biopsy  of the liver in view of his abnormal lesions in the liver. The biopsy  showed invasive adenocarcinoma from the liver biopsy. Therefore,  Medical Oncology is consulted for further evaluation and management.    The patient had bilateral pleural effusions. The patient underwent  ultrasound-guided thoracentesis in view of the pleural effusion. The  pleural effusion did not show any malignant cells. CT scan of the  abdomen and pelvis showed innumerable enhancing masses scattered  throughout the hepatic parenchyma highly concerning for metastatic  disease, large bilateral pleural effusions were seen, small amount of  free fluid in the pelvis, haziness throughout the superficial  subcutaneous soft tissues which may be related to his anasarca,  cholelithiasis, and colonic diverticulosis were seen. The patient also  underwent evaluation with CTA of the chest which did not reveal any  pulmonary embolism. The pathology report on the liver biopsy revealed poorly differentiated  adenocarcinoma, primary intrahepatic cholangiocarcinoma versus  metastatic adenocarcinoma. PAST MEDICAL HISTORY:  Positive for atrial fibrillation, coronary artery  disease, congestive heart failure, enlarged prostate, GERD,  hyperlipidemia, hypertension, Medtronic dual pacemaker. PAST SURGICAL HISTORY:  Positive for pacemaker insertion in 2008, hernia  repair, cardiac catheterization, colonoscopy. ALLERGIES:  LATEX, VASOTEC, NEOSPORIN, SULFA ANTIBIOTICS, AND VOLTAREN. FAMILY HISTORY:  Noncontributory. SOCIAL HISTORY:  Noncontributory. CURRENT MEDICATIONS:  Eliquis, Bumex, lactobacillus, Proscar, Crestor,  Coreg, Lopid, Protonix, mesalamine. The patient's p.r.n. medications  include Imodium, Klor-Con, baclofen, Zofran ODT and Zofran injection,  Tylenol. REVIEW OF SYSTEMS:  As mentioned in the history of present illness. PHYSICAL EXAMINATION:  General:  The patient is a thinly built male, looks weak and tired with  the appearance of a chronically ill person. Vital Signs:  Temperature 97.7, respiratory rate 16, pulse 61, BP  111/57, pulse oximetry 97% on room air. HEENT:  Head:  Atraumatic, normocephalic. Eyes:  Extraocular muscles  are intact.   Conjunctivae

## 2023-03-28 NOTE — PROGRESS NOTES
PROGRESS NOTE      Patient:  Michelle Hassan  Unit/Bed:5K-05/005-A  YOB: 1940  MRN: [id number]   Acct: [de-identified]    PCP: Giana Abel MD    Date of Admission: 3/17/2023 LOS: 11    Date of Evaluation:  3/28/2023    Anticipated Discharge: 1-2 days    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, suspicious of extrahepatic metastatic disease:  - CTA abdomen/pelvis and CTA chest done for TAVR work-up on 3/20, noted the above  - Case discussed with IR, plans for procedure/biopsy 3/22 , is higher risk due to location of mass that will be biopsied -> Biopsy completed on 8/35 no complications, discussed with lab and results to be returned on 3/23/3/24  - Palliative care and Heme/Oncology consulted appreciate recommendations, CEA and PSA non-contributory at this time, AFP tumor marker ordered per Dr. Guzman Ojeda pending    Severe aortic stenosis, symptomatic  Heart failure preserved ejection fraction  Peripheral edema  - Echo on 3/18 demonstrating EF 50 to 55%, transaortic velocity 3.9 m/s, mean gradient 38 mmHg, area 0.81 cm², mild aortic regurgitation  - 3+ pitting edema on bilateral lower extremities ongoing for last few weeks per patient, elevated BNP  - Cardiology consulted during stay, appreciate recommendations  - Increase Bumex 1 mg IV twice daily (3/20) -> Cont diuresing (3/23)  - Case discussed with cardiology on 3/23 and plans to get diagnosis/prognosis of liver lesions, no cath for now and plans to follow as outpatient, okay to resume Eliquis when appropriate  - Cardiothoracic surgery consulted and TAVR work-up being done -> Likely work up as OP as cancer diagnosis has become the more urgent medical issue at hand  - Strict I's/O's, daily weights, potassium greater than 4 and magnesium greater than 2, with 1500cc fluid restriction    Community-acquired pneumonia  Large bilateral pleural effusions, resolved  - CXR imaging on arrival demonstrated retrocardiac

## 2023-03-28 NOTE — PROGRESS NOTES
Notified Dr. Mainor Cruz regarding patient and family's wishes to obtain a second opinion. Added patient to Dr. Golden Blowers list. Currently Dr. Mainor Cruz is not rounding inpatient, but would be happy to offer his opinion and have patient follow-up in the outpatient setting.

## 2023-03-28 NOTE — PLAN OF CARE
Problem: Safety - Adult  Goal: Free from fall injury  3/23/2023 1118 by Lizz Boone RN  Outcome: Progressing    Fall assessment completed. Patient using call light appropriately to call for assistance with ambulation to bathroom. Personal items within reach. Patient is also compliant with use of non-skid slippers. Problem: ABCDS Injury Assessment  Goal: Absence of physical injury  3/23/2023 1118 by Lizz Boone RN  Outcome: Progressing  No falls noted this shift. Patient ambulates with x1 staff assistance without difficulty. Family member at bedside, spent the day. Bed kept in low position. Safe environment maintained. Bedside table & call light in reach. Uses call light appropriately when needing assistance. Problem: Chronic Conditions and Co-morbidities  Goal: Patient's chronic conditions and co-morbidity symptoms are monitored and maintained or improved  3/23/2023 1118 by Lizz Boone RN  Outcome: Progressing     Patient's chronic conditions and co-morbidity symptoms are monitored. Problem: Pain  Goal: Verbalizes/displays adequate comfort level or baseline comfort level  3/23/2023 1118 by Lizz oBone RN  Outcome: Progressing  Patient states pain relief from PRN pain medications. Pain reassessed one hour post PRN pain medication given. Patient rates pain 0 on JOSE R 0-10 scale. Problem: Skin/Tissue Integrity  Goal: Absence of new skin breakdown  Description: 1. Monitor for areas of redness and/or skin breakdown  2. Assess vascular access sites hourly  3. Every 4-6 hours minimum:  Change oxygen saturation probe site  4. Every 4-6 hours:  If on nasal continuous positive airway pressure, respiratory therapy assess nares and determine need for appliance change or resting period. 3/23/2023 1118 by Lizz Boone RN  Outcome: Progressing  No skin breakdown this shift. Patient being assisted with turning.  Patients states understanding of repositioning every two hours. Problem: Respiratory - Adult  Goal: Clear lung sounds  3/23/2023 1118 by Deborah Esteves RN  Outcome: Progressing  Patients oxygen saturation 94 % on room air. No shortness of breath noted. Lung sounds clear, able C&DB as ordered. Problem: Discharge Planning  Goal: Discharge to home or other facility with appropriate resources  3/23/2023 1118 by Deborah Esteves RN  Outcome: Progressing  Discharge plan is in process. Plan discharge home with family. Problem: Nutrition Deficit:  Goal: Optimize nutritional status  3/23/2023 1118 by Deborah Esteves RN  Outcome: Progressing  Patients appetite good. Continuing to encourage fluids. Care plan reviewed with patient and family. Patient andfamily verbalize understanding of the plan of care and contribute to goal setting.

## 2023-03-29 ENCOUNTER — APPOINTMENT (OUTPATIENT)
Dept: PHYSICAL THERAPY | Age: 83
End: 2023-03-29
Payer: MEDICARE

## 2023-03-29 LAB
ALBUMIN SERPL BCG-MCNC: 3.5 G/DL (ref 3.5–5.1)
ALP SERPL-CCNC: 131 U/L (ref 38–126)
ALT SERPL W/O P-5'-P-CCNC: 8 U/L (ref 11–66)
ANION GAP SERPL CALC-SCNC: 10 MEQ/L (ref 8–16)
AST SERPL-CCNC: 41 U/L (ref 5–40)
BASOPHILS ABSOLUTE: 0.1 THOU/MM3 (ref 0–0.1)
BASOPHILS NFR BLD AUTO: 0.6 %
BILIRUB SERPL-MCNC: 0.6 MG/DL (ref 0.3–1.2)
BUN SERPL-MCNC: 23 MG/DL (ref 7–22)
CA-I BLD ISE-SCNC: 1.33 MMOL/L (ref 1.12–1.32)
CALCIUM SERPL-MCNC: 10.3 MG/DL (ref 8.5–10.5)
CHLORIDE SERPL-SCNC: 99 MEQ/L (ref 98–111)
CO2 SERPL-SCNC: 26 MEQ/L (ref 23–33)
CREAT SERPL-MCNC: 0.5 MG/DL (ref 0.4–1.2)
DEPRECATED RDW RBC AUTO: 51.2 FL (ref 35–45)
EOSINOPHIL NFR BLD AUTO: 4.2 %
EOSINOPHILS ABSOLUTE: 0.6 THOU/MM3 (ref 0–0.4)
ERYTHROCYTE [DISTWIDTH] IN BLOOD BY AUTOMATED COUNT: 15.1 % (ref 11.5–14.5)
GFR SERPL CREATININE-BSD FRML MDRD: > 60 ML/MIN/1.73M2
GLUCOSE SERPL-MCNC: 96 MG/DL (ref 70–108)
HCT VFR BLD AUTO: 34.8 % (ref 42–52)
HGB BLD-MCNC: 11.1 GM/DL (ref 14–18)
IMM GRANULOCYTES # BLD AUTO: 0.08 THOU/MM3 (ref 0–0.07)
IMM GRANULOCYTES NFR BLD AUTO: 0.6 %
LYMPHOCYTES ABSOLUTE: 1.3 THOU/MM3 (ref 1–4.8)
LYMPHOCYTES NFR BLD AUTO: 9.2 %
MAGNESIUM SERPL-MCNC: 1.6 MG/DL (ref 1.6–2.4)
MCH RBC QN AUTO: 30.1 PG (ref 26–33)
MCHC RBC AUTO-ENTMCNC: 31.9 GM/DL (ref 32.2–35.5)
MCV RBC AUTO: 94.3 FL (ref 80–94)
MONOCYTES ABSOLUTE: 1.3 THOU/MM3 (ref 0.4–1.3)
MONOCYTES NFR BLD AUTO: 9.1 %
NEUTROPHILS NFR BLD AUTO: 76.3 %
NRBC BLD AUTO-RTO: 0 /100 WBC
PLATELET # BLD AUTO: 213 THOU/MM3 (ref 130–400)
PMV BLD AUTO: 9.5 FL (ref 9.4–12.4)
POTASSIUM SERPL-SCNC: 3.8 MEQ/L (ref 3.5–5.2)
PROT SERPL-MCNC: 6.5 G/DL (ref 6.1–8)
RBC # BLD AUTO: 3.69 MILL/MM3 (ref 4.7–6.1)
SEGMENTED NEUTROPHILS ABSOLUTE COUNT: 11 THOU/MM3 (ref 1.8–7.7)
SODIUM SERPL-SCNC: 135 MEQ/L (ref 135–145)
WBC # BLD AUTO: 14.4 THOU/MM3 (ref 4.8–10.8)

## 2023-03-29 PROCEDURE — 83735 ASSAY OF MAGNESIUM: CPT

## 2023-03-29 PROCEDURE — 82330 ASSAY OF CALCIUM: CPT

## 2023-03-29 PROCEDURE — 99232 SBSQ HOSP IP/OBS MODERATE 35: CPT | Performed by: INTERNAL MEDICINE

## 2023-03-29 PROCEDURE — 6370000000 HC RX 637 (ALT 250 FOR IP): Performed by: HOSPITALIST

## 2023-03-29 PROCEDURE — 85025 COMPLETE CBC W/AUTO DIFF WBC: CPT

## 2023-03-29 PROCEDURE — 2500000003 HC RX 250 WO HCPCS

## 2023-03-29 PROCEDURE — 97535 SELF CARE MNGMENT TRAINING: CPT

## 2023-03-29 PROCEDURE — 6370000000 HC RX 637 (ALT 250 FOR IP)

## 2023-03-29 PROCEDURE — 36415 COLL VENOUS BLD VENIPUNCTURE: CPT

## 2023-03-29 PROCEDURE — 80053 COMPREHEN METABOLIC PANEL: CPT

## 2023-03-29 PROCEDURE — 97110 THERAPEUTIC EXERCISES: CPT

## 2023-03-29 PROCEDURE — 1200000000 HC SEMI PRIVATE

## 2023-03-29 PROCEDURE — 2580000003 HC RX 258: Performed by: HOSPITALIST

## 2023-03-29 PROCEDURE — 97116 GAIT TRAINING THERAPY: CPT

## 2023-03-29 RX ADMIN — APIXABAN 5 MG: 5 TABLET, FILM COATED ORAL at 09:25

## 2023-03-29 RX ADMIN — BUMETANIDE 1 MG: 0.25 INJECTION INTRAMUSCULAR; INTRAVENOUS at 09:25

## 2023-03-29 RX ADMIN — Medication 1 TABLET: at 09:25

## 2023-03-29 RX ADMIN — MESALAMINE 800 MG: 400 CAPSULE, DELAYED RELEASE ORAL at 21:23

## 2023-03-29 RX ADMIN — FINASTERIDE 5 MG: 5 TABLET, FILM COATED ORAL at 09:25

## 2023-03-29 RX ADMIN — PANTOPRAZOLE SODIUM 40 MG: 40 TABLET, DELAYED RELEASE ORAL at 05:34

## 2023-03-29 RX ADMIN — Medication 1 CAPSULE: at 16:40

## 2023-03-29 RX ADMIN — ACETAMINOPHEN 650 MG: 325 TABLET ORAL at 22:54

## 2023-03-29 RX ADMIN — SODIUM CHLORIDE, PRESERVATIVE FREE 10 ML: 5 INJECTION INTRAVENOUS at 21:21

## 2023-03-29 RX ADMIN — APIXABAN 5 MG: 5 TABLET, FILM COATED ORAL at 21:22

## 2023-03-29 RX ADMIN — Medication 1 CAPSULE: at 09:25

## 2023-03-29 RX ADMIN — BUMETANIDE 1 MG: 0.25 INJECTION INTRAMUSCULAR; INTRAVENOUS at 21:26

## 2023-03-29 RX ADMIN — LOPERAMIDE HYDROCHLORIDE 2 MG: 2 CAPSULE ORAL at 09:25

## 2023-03-29 RX ADMIN — CARVEDILOL 25 MG: 25 TABLET, FILM COATED ORAL at 16:40

## 2023-03-29 RX ADMIN — GEMFIBROZIL 600 MG: 600 TABLET ORAL at 16:40

## 2023-03-29 RX ADMIN — MESALAMINE 800 MG: 400 CAPSULE, DELAYED RELEASE ORAL at 09:25

## 2023-03-29 RX ADMIN — SODIUM CHLORIDE, PRESERVATIVE FREE 10 ML: 5 INJECTION INTRAVENOUS at 09:25

## 2023-03-29 RX ADMIN — CARVEDILOL 25 MG: 25 TABLET, FILM COATED ORAL at 09:25

## 2023-03-29 RX ADMIN — ROSUVASTATIN 5 MG: 10 TABLET, FILM COATED ORAL at 09:25

## 2023-03-29 RX ADMIN — GEMFIBROZIL 600 MG: 600 TABLET ORAL at 05:34

## 2023-03-29 ASSESSMENT — PAIN SCALES - GENERAL: PAINLEVEL_OUTOF10: 0

## 2023-03-29 NOTE — PALLIATIVE CARE
Follow Up / Progress Note        Patient:   Ria Mims  YOB: 1940  Age:  79 y.o. Room:  [facility]-  MRN:  [id number]              Dr Cynthia Beck was able to speak with patient and wife last night. Per Dr Samantha Marie note \"With his severe heart failure, I will put him  into congestive/pulmonary edema, congestive failure with any IV fluids  that I administer to him and I have to take that into consideration. He  looks more like a hospice candidate to me at this point\"    Family/Patient Discussion:  Returned to unit to follow up with patient. Patient up in chair, multiple family members (including wife) at bedside. Discussed his conversation with Dr Cynthia Beck. Patient stated that Dr Cynthia Beck encouraged him to move forward with rehab, his biopsy was sent to [place], and he will follow up with him outpatient. Discussed patient's expectations. Including patient's limited treatment options due to heart condition. Patient stated \"I am just doing what they tell me to\". Discussed quality of life with and without cancer treatment, including comfort care. Encouraged patient to keep open conversation with Dr Cynthia Beck. Patient and family denied further questions at this time. Plan/Follow-Up:  Patient hopes to be discharged to rehab soon. Please call if further needs arise.       Electronically signed by Kimani Amos RN on 3/29/2023 at 10:16 AM             Palliative Care Office: [phone]

## 2023-03-29 NOTE — PROGRESS NOTES
Dr Amber Tobin called to speak with patient and family. Patient at bedside commode, Dr Amber Tobin will call back in couple minutes. Assisted patient to bed and called wife on cordless phone and placed her on speaker phone so when Dr Hiram Sweeney back  2015 Dr Amber Tobin returned call back. Spoke with patient and wife in detail regarding condition. Patient wishes to follow up with Dr Amber Tobin as outpatient. Patient would like to go to Rehab to get stronger. Dr Amber Tobin stated that would be appropriate while they are awaiting results from biopsy being sent out for further testing and he would see him in office after for further plan of care. Wife states she will be coming to the hospital tomorrow and she will further discuss things with patient. Dr Amber Tobin would like patient and family to have copy of CT scan.

## 2023-03-29 NOTE — CARE COORDINATION
3/29/23, 1:29 PM EDT    DISCHARGE PLANNING EVALUATION    SW met with pt to verify preference for [facility] [place] as first choice and [facility] [place] as 2nd, pt, wife, and daughter all agree. Call to [facility] with [phone], left a voicemail for a call back. 2:04 PM EDT  Call from [place] [phone] with [phone], they do have referral, he asked about chemo plans, SW did let him know no plans for chemotherapy. They will review and get back with CRISTINA.

## 2023-03-29 NOTE — CARE COORDINATION
3/29/23, 1:31 PM EDT    DISCHARGE ON [address] day: 12  Location: -05/005-A Reason for admit: Hypercalcemia [E83.52]  Hypomagnesemia [E83.42]  Generalized weakness [R53.1]  Elevated troponin [R77.8]  Pneumonia due to infectious organism, unspecified laterality, unspecified part of lung [J18.9]  Acute congestive heart failure, unspecified heart failure type ([facility] [address] [I50.9]   Procedure:   3/21/2023 ultrasound guided right thoracentesis- 0.3 L fluid removed  3/22/2023 CT guided liver mass biopsy  3/23/2023 ultrasound guided thoracentesis, 0.475 L fluid removed. Barriers to Discharge: Dr. Karen Cormier will follow outpatient for Oncology, Eliquis, IV Bumex twice daily, prn Tylenol and Zofran, Magnesium and Potassium replacement protocol, regular diet with 1500 ml fluid restriction, acapella, incentive spirometry, daily weights, SCD's, strict I & O, PT/OT, Palliative Care, SS, Dietician and CHF RN following. PCP: Heather Juan MD  Readmission Risk Score: 15.9%  Patient Goals/Plan/Treatment Preferences: Peter Brock is from home with his wife. New SNF at discharge.

## 2023-03-29 NOTE — PLAN OF CARE
Problem: Safety - Adult  Goal: Free from fall injury  Outcome: Progressing  Flowsheets (Taken 3/29/2023 0314 by Shiela Mensah, RN)  Free From Fall Injury: Instruct family/caregiver on patient safety     Problem: ABCDS Injury Assessment  Goal: Absence of physical injury  Outcome: Progressing  Flowsheets (Taken 3/29/2023 0314 by Shiela Mensah, RN)  Absence of Physical Injury: Implement safety measures based on patient assessment     Problem: Chronic Conditions and Co-morbidities  Goal: Patient's chronic conditions and co-morbidity symptoms are monitored and maintained or improved  Outcome: Progressing  Flowsheets (Taken 3/29/2023 0900)  Care Plan - Patient's Chronic Conditions and Co-Morbidity Symptoms are Monitored and Maintained or Improved: Monitor and assess patient's chronic conditions and comorbid symptoms for stability, deterioration, or improvement     Problem: Pain  Goal: Verbalizes/displays adequate comfort level or baseline comfort level  Outcome: Progressing  Flowsheets (Taken 3/29/2023 0915)  Verbalizes/displays adequate comfort level or baseline comfort level:   Encourage patient to monitor pain and request assistance   Assess pain using appropriate pain scale   Administer analgesics based on type and severity of pain and evaluate response   Implement non-pharmacological measures as appropriate and evaluate response     Problem: Skin/Tissue Integrity  Goal: Absence of new skin breakdown  Description: 1. Monitor for areas of redness and/or skin breakdown  2. Assess vascular access sites hourly  3. Every 4-6 hours minimum:  Change oxygen saturation probe site  4. Every 4-6 hours:  If on nasal continuous positive airway pressure, respiratory therapy assess nares and determine need for appliance change or resting period.   Outcome: Progressing  Note: Encourage patient to turn and reposition q 2 hours; encourage patient to walk     Problem: Respiratory - Adult  Goal: Clear lung sounds  Outcome: Progressing  Note: Cough and deep breathe     Problem: Cardiovascular - Adult  Goal: Maintains optimal cardiac output and hemodynamic stability  Outcome: Progressing  Flowsheets (Taken 3/29/2023 1839)  Maintains optimal cardiac output and hemodynamic stability:   Monitor blood pressure and heart rate   Monitor urine output and notify Licensed Independent Practitioner for values outside of normal range   Assess for signs of decreased cardiac output     Problem: Skin/Tissue Integrity - Adult  Goal: Skin integrity remains intact  Outcome: Progressing  Flowsheets (Taken 3/29/2023 0900)  Skin Integrity Remains Intact: Monitor for areas of redness and/or skin breakdown     Problem: Musculoskeletal - Adult  Goal: Return mobility to safest level of function  Outcome: Progressing  Flowsheets (Taken 3/29/2023 1839)  Return Mobility to Safest Level of Function:   Assess patient stability and activity tolerance for standing, transferring and ambulating with or without assistive devices   Assist with transfers and ambulation using safe patient handling equipment as needed   Ensure adequate protection for wounds/incisions during mobilization     Problem: Gastrointestinal - Adult  Goal: Minimal or absence of nausea and vomiting  Outcome: Progressing  Flowsheets (Taken 3/29/2023 1839)  Minimal or absence of nausea and vomiting:   Administer IV fluids as ordered to ensure adequate hydration   Administer ordered antiemetic medications as needed   Provide nonpharmacologic comfort measures as appropriate     Problem: Gastrointestinal - Adult  Goal: Maintains or returns to baseline bowel function  Outcome: Progressing  Flowsheets (Taken 3/29/2023 1839)  Maintains or returns to baseline bowel function:   Assess bowel function   Encourage oral fluids to ensure adequate hydration     Problem: Genitourinary - Adult  Goal: Absence of urinary retention  Outcome: Progressing  Flowsheets (Taken 3/29/2023 1839)  Absence of urinary retention:

## 2023-03-29 NOTE — PROGRESS NOTES
03/17/2023 06:47 PM    RBCUA 3-5 03/17/2023 06:47 PM    BLOODU NEGATIVE 03/17/2023 06:47 PM    SPECGRAV 1.010 04/13/2021 10:02 AM    GLUCOSEU NEGATIVE 03/17/2023 06:47 PM       All radiology images and reports reviewed and interpreted by me:  Radiology:  Valeria Medina Which side should the procedure be performed? Left   Final Result   Status post left thoracentesis            **This report has been created using voice recognition software. It may contain minor errors which are inherent in voice recognition technology. **      Final report electronically signed by Dr. Crystal Rodriges on 3/24/2023 1:43 PM      XR CHEST PA INSPIRATION 1 VW   Final Result   1. No pneumothorax following left-sided thoracentesis is seen. There is minimal residual pleural fluid at the left lung base            **This report has been created using voice recognition software. It may contain minor errors which are inherent in voice recognition technology. **      Final report electronically signed by Dr. Crystal Rodriges on 3/23/2023 5:38 PM      CT NEEDLE BIOPSY LIVER PERCUTANEOUS   Final Result   Status post CT-guided liver biopsy. **This report has been created using voice recognition software. It may contain minor errors which are inherent in voice recognition technology. **      Final report electronically signed by Dr. Crystal Rodriges on 3/22/2023 3:34 PM      CT GUIDED NEEDLE PLACEMENT   Final Result   Status post CT-guided liver biopsy. **This report has been created using voice recognition software. It may contain minor errors which are inherent in voice recognition technology. **      Final report electronically signed by Dr. Crystal Rodriges on 3/22/2023 3:34 PM      US THORACENTESIS Which side should the procedure be performed? Right   Final Result      Successful ultrasound-guided thoracentesis with  0.3 L of fluid drained. **This report has been created using voice recognition software.  It may contain minor Final Result      1. RIGHT   ICA . Eugenie [place]bers[place] Eugenie Leaf Moderate mixed plaque, mild stenosis, less than 50%. ..    ECA. . Moderate calcific plaque, moderate stenosis. CCA. Unremarkable . Eugenie [place] VERT Antegrade flow. ..      2. LEFT    ICA. .... Moderate mixed plaque, mild stenosis, less than 50%. .. ECA. .. Moderate mixed plaque, mild stenosis. CCA. .. Minimal soft plaque, no appreciable stenosis. Maco Hatchet. Antegrade flow. **This report has been created using voice recognition software. It may contain minor errors which are inherent in voice recognition technology. **      Final report electronically signed by Dr. Claudette Purple on 3/20/2023 1:27 PM      VL DUP LOWER EXTREMITY VENOUS BILATERAL   Final Result   1. No sonographic evidence of lower extremity DVT. **This report has been created using voice recognition software. It may contain minor errors which are inherent in voice recognition technology. **      Final report electronically signed by Dr. Pablo Benito on 3/18/2023 7:09 PM      CT HEAD WO CONTRAST   Final Result      1. Stable CT scan of the brain, no interval change since previous study dated 24th of November 2008. 2. Diffusion MRI scan of the brain may be helpful for better evaluation if the patient has persistent symptoms. **This report has been created using voice recognition software. It may contain minor errors which are inherent in voice recognition technology. **      Final report electronically signed by DR Sammie Reveles on 3/17/2023 3:29 PM      XR CHEST (2 VW)   Final Result   1. Mild cardiomegaly. Permanent dual-chamber pacemaker. 2. Mild retrocardiac atelectasis/pneumonia. Questionable tiny effusion left side posteriorly. **This report has been created using voice recognition software. It may contain minor errors which are inherent in voice recognition technology. **      Final report electronically signed by Dr. Claudette Purple on 3/17/2023 2:51 PM          Diet: ADULT ORAL

## 2023-03-29 NOTE — PLAN OF CARE
Problem: Safety - Adult  Goal: Free from fall injury  Outcome: Progressing  Flowsheets (Taken 3/29/2023 0314)  Free From Fall Injury: Instruct family/caregiver on patient safety     Problem: ABCDS Injury Assessment  Goal: Absence of physical injury  Outcome: Progressing  Flowsheets (Taken 3/29/2023 0314)  Absence of Physical Injury: Implement safety measures based on patient assessment     Problem: Chronic Conditions and Co-morbidities  Goal: Patient's chronic conditions and co-morbidity symptoms are monitored and maintained or improved  Outcome: Progressing  Flowsheets  Taken 3/29/2023 [address] - Patient's Chronic Conditions and Co-Morbidity Symptoms are Monitored and Maintained or Improved: Monitor and assess patient's chronic conditions and comorbid symptoms for stability, deterioration, or improvement  Taken 3/28/2023 2000  Care Plan - Patient's Chronic Conditions and Co-Morbidity Symptoms are Monitored and Maintained or Improved: Monitor and assess patient's chronic conditions and comorbid symptoms for stability, deterioration, or improvement     Problem: Pain  Goal: Verbalizes/displays adequate comfort level or baseline comfort level  Outcome: Progressing  Flowsheets (Taken 3/29/2023 0314)  Verbalizes/displays adequate comfort level or baseline comfort level: Encourage patient to monitor pain and request assistance     Problem: Skin/Tissue Integrity  Goal: Absence of new skin breakdown  Description: 1. Monitor for areas of redness and/or skin breakdown  2. Assess vascular access sites hourly  3. Every 4-6 hours minimum:  Change oxygen saturation probe site  4. Every 4-6 hours:  If on nasal continuous positive airway pressure, respiratory therapy assess nares and determine need for appliance change or resting period.   Outcome: Progressing     Problem: Respiratory - Adult  Goal: Clear lung sounds  Outcome: Progressing     Problem: Discharge Planning  Goal: Discharge to home or other facility with

## 2023-03-29 NOTE — PROGRESS NOTES
[facility]  INPATIENT PHYSICAL THERAPY  DAILY NOTE  [facility] ONC MED 5K - 5K-05005-A    Time In: 4706  Time Out: 9925  Timed Code Treatment Minutes: 28 Minutes  Minutes: 32          Date: 3/29/2023  Patient Name: Izabella Sebastian,  Gender:  male        MRN: [id number]  : 1940  (79 y.o.)     Referring Practitioner: James Tyler MD  Diagnosis: Hypercalcemia  Additional Pertinent Hx: 79 y.o. male who presents with complaints of generalized weakness. Patient reports that he has not been feeling well for the last couple weeks. Patient reports that he has had a cough that was initially dry now productive sometimes of pink or clear sputum. CV surgery recommnending TAVR d/t severe symptomatic aortic stenosis. --liver mass biopsy 3/22--Metastatic malignancy confirmed, invasive adenocarcinoma. Prior Level of Function:  Lives With: Spouse  Type of Home: House  Home Layout: One level  Home Access: Stairs to enter with rails  Entrance Stairs - Number of Steps: 1 threshold  Home Equipment: Randy Guerrero, bruna, Tan Ra, 4 wheeled, Lift chair   Bathroom Shower/Tub: Walk-in shower  Bathroom Toilet: Handicap height  Bathroom Equipment: Grab bars in shower, Toilet raiser  Bathroom Accessibility: Accessible (has to park 4 w/w outside [facility])    [facility]: Family  ADL Assistance: Independent (except assist for socks)  [address]: Independent  Homemaking Responsibilities: Yes  Ambulation Assistance: Independent  Transfer Assistance: Independent  Active : No  Additional Comments: patient used 4 w/w around home. spouse is in good health to assist as needed. Restrictions/Precautions:  Restrictions/Precautions: General Precautions, Fall Risk     SUBJECTIVE: Pt in bed upon arrival, and agrees to therapy, Pt pleasant and cooperative.  Pt reporting continued diarrhea, requesting some \"light immodium or something\" notified RN    PAIN: 0/10: denies    Vitals: no assessed per clinical

## 2023-03-30 LAB
AFP SERPL-MCNC: 2.6 UG/L
ALBUMIN SERPL BCG-MCNC: 3.7 G/DL (ref 3.5–5.1)
ALP SERPL-CCNC: 147 U/L (ref 38–126)
ALT SERPL W/O P-5'-P-CCNC: 8 U/L (ref 11–66)
ANION GAP SERPL CALC-SCNC: 11 MEQ/L (ref 8–16)
AST SERPL-CCNC: 42 U/L (ref 5–40)
BILIRUB SERPL-MCNC: 0.8 MG/DL (ref 0.3–1.2)
BUN SERPL-MCNC: 24 MG/DL (ref 7–22)
CALCIUM SERPL-MCNC: 10.8 MG/DL (ref 8.5–10.5)
CHLORIDE SERPL-SCNC: 97 MEQ/L (ref 98–111)
CO2 SERPL-SCNC: 26 MEQ/L (ref 23–33)
CREAT SERPL-MCNC: 0.5 MG/DL (ref 0.4–1.2)
GFR SERPL CREATININE-BSD FRML MDRD: > 60 ML/MIN/1.73M2
GLUCOSE SERPL-MCNC: 150 MG/DL (ref 70–108)
MAGNESIUM SERPL-MCNC: 1.6 MG/DL (ref 1.6–2.4)
POTASSIUM SERPL-SCNC: 3.7 MEQ/L (ref 3.5–5.2)
PROT SERPL-MCNC: 6.8 G/DL (ref 6.1–8)
SODIUM SERPL-SCNC: 134 MEQ/L (ref 135–145)

## 2023-03-30 PROCEDURE — 83735 ASSAY OF MAGNESIUM: CPT

## 2023-03-30 PROCEDURE — 99232 SBSQ HOSP IP/OBS MODERATE 35: CPT | Performed by: INTERNAL MEDICINE

## 2023-03-30 PROCEDURE — 36415 COLL VENOUS BLD VENIPUNCTURE: CPT

## 2023-03-30 PROCEDURE — 6370000000 HC RX 637 (ALT 250 FOR IP)

## 2023-03-30 PROCEDURE — 1200000000 HC SEMI PRIVATE

## 2023-03-30 PROCEDURE — 6370000000 HC RX 637 (ALT 250 FOR IP): Performed by: INTERNAL MEDICINE

## 2023-03-30 PROCEDURE — 97530 THERAPEUTIC ACTIVITIES: CPT

## 2023-03-30 PROCEDURE — 97110 THERAPEUTIC EXERCISES: CPT

## 2023-03-30 PROCEDURE — 2500000003 HC RX 250 WO HCPCS

## 2023-03-30 PROCEDURE — 6370000000 HC RX 637 (ALT 250 FOR IP): Performed by: HOSPITALIST

## 2023-03-30 PROCEDURE — 80053 COMPREHEN METABOLIC PANEL: CPT

## 2023-03-30 PROCEDURE — 2580000003 HC RX 258: Performed by: HOSPITALIST

## 2023-03-30 RX ORDER — POTASSIUM CHLORIDE 20 MEQ/1
10 TABLET, EXTENDED RELEASE ORAL
Status: DISCONTINUED | OUTPATIENT
Start: 2023-03-30 | End: 2023-03-31 | Stop reason: HOSPADM

## 2023-03-30 RX ORDER — CALCIUM POLYCARBOPHIL 625 MG 625 MG/1
1250 TABLET ORAL DAILY
Status: DISCONTINUED | OUTPATIENT
Start: 2023-03-30 | End: 2023-03-31 | Stop reason: HOSPADM

## 2023-03-30 RX ADMIN — SODIUM CHLORIDE, PRESERVATIVE FREE 10 ML: 5 INJECTION INTRAVENOUS at 08:59

## 2023-03-30 RX ADMIN — BUMETANIDE 1 MG: 0.25 INJECTION INTRAMUSCULAR; INTRAVENOUS at 08:27

## 2023-03-30 RX ADMIN — MESALAMINE 800 MG: 400 CAPSULE, DELAYED RELEASE ORAL at 08:24

## 2023-03-30 RX ADMIN — ACETAMINOPHEN 650 MG: 325 TABLET ORAL at 22:16

## 2023-03-30 RX ADMIN — Medication 1 CAPSULE: at 08:25

## 2023-03-30 RX ADMIN — PANTOPRAZOLE SODIUM 40 MG: 40 TABLET, DELAYED RELEASE ORAL at 06:30

## 2023-03-30 RX ADMIN — POTASSIUM CHLORIDE 10 MEQ: 1500 TABLET, EXTENDED RELEASE ORAL at 17:03

## 2023-03-30 RX ADMIN — BUMETANIDE 1 MG: 0.25 INJECTION INTRAMUSCULAR; INTRAVENOUS at 21:58

## 2023-03-30 RX ADMIN — FINASTERIDE 5 MG: 5 TABLET, FILM COATED ORAL at 08:25

## 2023-03-30 RX ADMIN — SODIUM CHLORIDE, PRESERVATIVE FREE 10 ML: 5 INJECTION INTRAVENOUS at 21:53

## 2023-03-30 RX ADMIN — APIXABAN 5 MG: 5 TABLET, FILM COATED ORAL at 21:52

## 2023-03-30 RX ADMIN — APIXABAN 5 MG: 5 TABLET, FILM COATED ORAL at 08:25

## 2023-03-30 RX ADMIN — GEMFIBROZIL 600 MG: 600 TABLET ORAL at 17:04

## 2023-03-30 RX ADMIN — CARVEDILOL 25 MG: 25 TABLET, FILM COATED ORAL at 17:04

## 2023-03-30 RX ADMIN — CALCIUM POLYCARBOPHIL 1250 MG: 625 TABLET, FILM COATED ORAL at 18:39

## 2023-03-30 RX ADMIN — CARVEDILOL 25 MG: 25 TABLET, FILM COATED ORAL at 08:25

## 2023-03-30 RX ADMIN — MESALAMINE 800 MG: 400 CAPSULE, DELAYED RELEASE ORAL at 21:52

## 2023-03-30 RX ADMIN — Medication 1 TABLET: at 08:24

## 2023-03-30 RX ADMIN — ROSUVASTATIN 5 MG: 10 TABLET, FILM COATED ORAL at 08:25

## 2023-03-30 RX ADMIN — GEMFIBROZIL 600 MG: 600 TABLET ORAL at 06:32

## 2023-03-30 RX ADMIN — Medication 1 CAPSULE: at 17:04

## 2023-03-30 ASSESSMENT — PAIN SCALES - GENERAL
PAINLEVEL_OUTOF10: 3
PAINLEVEL_OUTOF10: 0

## 2023-03-30 ASSESSMENT — PAIN DESCRIPTION - DESCRIPTORS: DESCRIPTORS: DULL

## 2023-03-30 ASSESSMENT — PAIN DESCRIPTION - LOCATION: LOCATION: BACK

## 2023-03-30 NOTE — PROGRESS NOTES
Received report from LINDA Smith at this time. Patient is alert and oriented x4. Patient expresses no concerns and denies any pain at this time. Pupils are equal round and reactive to light. Skin is pink, dry, and intact. This patient has a pressure ulcer on his upper back that is currently covered. Lung sounds are clear throughout. Heart rate is 63 bpm, regular rate. Skin turgor and capillary refill <3 seconds. Hand grasp is strong and equal bilaterally. Abdomen is flat, non tender, and non distended at this time. No masses palpable. Bowel sounds are active in all four quadrants. Patient is able to move freely in bed without concern. No edema present on lower extremities. Slight edema present in right upper thigh. Pedal push and pull is strong and equal bilaterally. Dorsalis pedis and posterior tibial pulses are strong and equal bilaterally. Call light Is within reach. Patient is eating with visitor at bedside. Bed in low position.

## 2023-03-30 NOTE — PROGRESS NOTES
PROGRESS NOTE      Patient:  Imer Gilliland  Unit/Bed:5-05/005-A  YOB: 1940  MRN: [id number]   Acct: [de-identified]    PCP: Joel Betts MD    Date of Admission: 3/17/2023 LOS: 13    Date of Evaluation:  3/30/2023    Anticipated Discharge: Imminent, Pending Precert: Family and patient prepared for discharge to skilled nursing facility for a short term stay oriented to rehabilitation/PT/OT for <21 day. Thereafter, Imer Gilliland plans to follow up with Dr. John Wade for palliative options once strong enough to pursue them at home if possible.     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, suspicious of extrahepatic metastatic disease:  - CTA abdomen/pelvis and CTA chest done for TAVR work-up on 3/20, noted the above concerning mass  - Case discussed with IR, planned procedure/biopsy 3/22 , is higher risk due to location of mass that will be biopsied -> Biopsy completed on 6/96 no complications, discussed with lab and results to be returned on 3/23/3/24  - Palliative care and Heme/Oncology consulted appreciate recommendations, CEA, AFP, and PSA non-contributory at this time  - Patient and family agreeable to discharge to subacute SNF to rebuild patient strength, and will hold off on pursing chemotherapy pending further workup with Dr. John Wade  - Patient and family understand all treatment options are palliative in nature and not curative  - Quoc Sanders DO spoke with family and patient prepared for discharge to skilled nursing facility for a short term stay oriented to rehabilitation/PT/OT for <21 day  - Social work updated, pursuing precert and discharge    Severe aortic stenosis, symptomatic  Heart failure preserved ejection fraction  Peripheral edema, improved  - Echo on 3/18 demonstrating EF 50 to 55%, transaortic velocity 3.9 m/s, mean gradient 38 mmHg, area 0.81 cm², mild aortic regurgitation  - 3+ pitting edema on bilateral lower extremities ongoing hour   Intake 360 ml   Output 900 ml   Net -540 ml         Exam:  BP (!) 114/50   Pulse 60   Temp 97.6 °F (36.4 °C)   Resp 16   Ht 6' 1\" (1.854 m)   Wt 148 lb (67.1 kg)   SpO2 95%   PF (!) 16 L/min   BMI 19.53 kg/m²     Physical Exam  Constitutional:       General: He is not in acute distress. Appearance: Normal appearance. He is underweight. He is ill-appearing (frail & malnourished). He is not toxic-appearing or diaphoretic. HENT:      Head: Normocephalic and atraumatic. Right Ear: External ear normal.      Left Ear: External ear normal.      Nose: Nose normal. No rhinorrhea. Mouth/Throat:      Mouth: Mucous membranes are moist.      Pharynx: Oropharynx is clear. Eyes:      General: No scleral icterus. Extraocular Movements: Extraocular movements intact. Pupils: Pupils are equal, round, and reactive to light. Cardiovascular:      Rate and Rhythm: Normal rate and regular rhythm. Pulses: Normal pulses. Heart sounds: Murmur heard. Pulmonary:      Effort: Pulmonary effort is normal. No respiratory distress. Breath sounds: Normal breath sounds. No wheezing. Abdominal:      General: Abdomen is flat. Bowel sounds are normal. There is no distension. Palpations: Abdomen is soft. Tenderness: There is no abdominal tenderness. Musculoskeletal:         General: Swelling (trace BLE) present. No tenderness, deformity or signs of injury. Normal range of motion. Cervical back: Normal range of motion. No rigidity or tenderness. Skin:     General: Skin is warm and dry. Coloration: Skin is not jaundiced or pale. Findings: No bruising or rash. Neurological:      General: No focal deficit present. Mental Status: He is alert and oriented to person, place, and time. Motor: Weakness (severe generalized) present.       Coordination: Coordination normal.   Psychiatric:         Mood and Affect: Mood normal.         Behavior: Behavior normal.

## 2023-03-30 NOTE — DISCHARGE INSTR - COC
Continuity of Care Form    Patient Name: Ria Mims   :  1940  MRN:  [id number]    Admit date:  3/17/2023  Discharge date:  3/31/2023     Code Status Order: Full Code   Advance Directives:     Admitting Physician:  No admitting provider for patient encounter. PCP: Poli Christensen MD    Discharging Nurse: HERMELINDO Baker [facility] Unit/Room#: 5K-05/005-A  Discharging Unit Phone Number: [phone]    Emergency Contact:   Extended Emergency Contact Information  Primary Emergency Contact: Jerad Dominguez  Address: [address], [address] [place] of [address] Phone: [phone]  Relation: Spouse  Secondary Emergency Contact: Cristin Hinkle [address] Phone: [phone]  Relation: Child    Past Surgical History:  Past Surgical History:   Procedure Laterality Date    BUNIONECTOMY Left     Maximino Hall Left     Dr. Sachin Moraes      x2 Dr. Minra Lugo  2016    Dr. Ernesto Cook  3/22/2023    CT NEEDLE BIOPSY LIVER PERCUTANEOUS 3/22/2023 [facility] CT SCAN    HERNIA REPAIR Right 's    inguinal    PACEMAKER INSERTION  2008    Dr. Roman Cintron      Dr. Ursula Diaz       Immunization History:   Immunization History   Administered Date(s) Administered    COVID-19, PFIZER Bivalent BOOSTER, DO NOT Dilute, (age 12y+), IM, 30 mcg/0.3 mL 2022    COVID-19, PFIZER GRAY top, DO NOT Dilute, (age 15 y+), IM, 30 mcg/0.3 mL 2022    COVID-19, PFIZER PURPLE top, DILUTE for use, (age 15 y+), 30mcg/0.3mL 2021, 2021, 10/04/2021    Influenza Vaccine, unspecified formulation 10/14/2016    Influenza Virus Vaccine 2017    Influenza, AFLURIA (age 1 yrs+), FLUZONE, (age 10 mo+), MDV, 0.5mL 10/09/2018    Influenza, FLUAD, (age 72 y+), Bearing Status/Restrictions: No weight bearing restrictions  Other Medical Equipment (for information only, NOT a DME order):  walker  Other Treatments: n/a    Patient's personal belongings (please select all that are sent with patient):  Glasses    RN SIGNATURE:  Electronically signed by Moises Luna RN on 3/31/23 at 11:15 AM EDT    CASE MANAGEMENT/SOCIAL WORK SECTION    Inpatient Status Date: 3/17/2023    Readmission Risk Assessment Score:  Readmission Risk              Risk of Unplanned Readmission:  16           Discharging to Facility/ Agency   Name: [facility] [address] (if applicable)   Name:  Address:  Dialysis Schedule:  Phone:  Fax:    / signature: Electronically signed by MICHELLE Sainz on 3/30/23 at 11:38 AM EDT    PHYSICIAN SECTION    Prognosis: Guarded    Condition at Discharge: Stable    Rehab Potential (if transferring to Rehab): Guarded    Recommended Labs or Other Treatments After Discharge: Per outpatient team    Physician Certification: I certify the above information and transfer of Awilda Coppola  is necessary for the continuing treatment of the diagnosis listed and that he requires [facility] for less than 30 days.      Update Admission H&P: Changes in H&P as follows - Adenocarcinoma stage 4 confirmed on liver biopsy    PHYSICIAN SIGNATURE:  Electronically signed by Antonio Calixto DO on 3/31/23 at 11:05 AM EDT

## 2023-03-30 NOTE — PROGRESS NOTES
hands on assistance for safety with mobility and would benefit from continued skilled PT improve these impairments. Activity Tolerance:  Patient tolerance of  treatment: poor. Patient displayed increased fatigue with transfers and gait training to/from the commode, therefore held additional gait training distances per patient request.      Equipment Recommendations:Equipment Needed: No  Discharge Recommendations: Subacte/Skilled Nursing Facility  Plan: Current Treatment Recommendations: Strengthening, Balance training, Functional mobility training, Gait training, Transfer training, Endurance training, Safety education & training, Therapeutic activities  General Plan:  (5x GM)    Patient Education  Patient Education: Bed Mobility, Equipment Education, Transfers, Gait, Use of [employer], Activity Pacing, Pursed Lip Breathing,  - Patient Verbalized Understanding    Goals:  Patient Goals : be able to walk okay  Short Term Goals  Time Frame for Short Term Goals: by discharge  Short Term Goal 1: bed mobility with HOB flat, no rails, mod I for increased functional ind  Short Term Goal 2: sit<>Stand from various surfaces with LRD mod I for safe transfers  Short Term Goal 3: ambulate 48' with LRD mod I for safe household distances  Short Term Goal 4: pt to score a 11/28 on Tinetti to demonstrate a MDC and decreased fall risk  Long Term Goals  Time Frame for Long Term Goals : NA d/t short ELOS    Following session, patient left in safe position with all fall risk precautions in place.

## 2023-03-30 NOTE — PROGRESS NOTES
Alert and Oriented x4. Speech is clear, normal, and appropriate for age. Pupils are equal, round, and reactive to light. Lung sounds are clear throughout. Expresses no pain or concerns at this time. Skin is pink, dry, and intact. Stage 1 pressure ulcer located on right upper back and covered with a Allevyn dressing. Skin turgor and capillary refill is <3 seconds. Apical pulse is 61 strong and regular. Abdomen is flat, non distended, and non tender. No masses palpable. Bowel sounds are active in all four quadrants. +1 pitting edema present on right upper thigh. Edema present in right lower extremities. Extremities move freely in bed. Hand grasp strong and equal bilaterally. Pedal push and pull strong and equal bilaterally. Dorsalis pedis and posterior tibial pulses strong and equal bilaterally. Call light within reach, bed in low position. Gave report to LINDA Smith. Patient was resting In bed attempting to sleep at this time.

## 2023-03-30 NOTE — CARE COORDINATION
3/30/23, 11:39 AM EDT    DISCHARGE PLANNING EVALUATION    Call to Gosia Guzmán with [facility], reports they had called wife yesterday and she said they did not make decision yet as to further treatment or not. Provider plans to visit with pt and wife that pt would not get cancer treatment while in SNF. Call to [facility] with [address], referral made. She did get back with SW, reports they do not have current availability, but if pt still here next week they can consider pt again. Call to [facility] [place] again, reports they are able to accept pt and did start precert. 11:43 AM  Spoke with pt's wife and provided update, pt sleeping. 2:36 PM EDT  Call from Gosia Guzmán with [facility] of [address] [place] [address] , precert is approved, they can take pt tomorrow if he is ready for discharge. CRISTINA met with pt's wife and daughter, updated on acceptance, family will plans on transporting at discharge, SW updated nurse.

## 2023-03-30 NOTE — PLAN OF CARE
Problem: Safety - Adult  Goal: Free from fall injury  Outcome: Progressing  Flowsheets (Taken 3/29/2023 0314 by Tracey Ivy RN)  Free From Fall Injury: Instruct family/caregiver on patient safety     Problem: ABCDS Injury Assessment  Goal: Absence of physical injury  Outcome: Progressing  Flowsheets (Taken 3/29/2023 0314 by Tracey Ivy RN)  Absence of Physical Injury: Implement safety measures based on patient assessment     Problem: Chronic Conditions and Co-morbidities  Goal: Patient's chronic conditions and co-morbidity symptoms are monitored and maintained or improved  Outcome: Progressing  Flowsheets (Taken 3/30/2023 0830)  Care Plan - Patient's Chronic Conditions and Co-Morbidity Symptoms are Monitored and Maintained or Improved: Monitor and assess patient's chronic conditions and comorbid symptoms for stability, deterioration, or improvement     Problem: Pain  Goal: Verbalizes/displays adequate comfort level or baseline comfort level  Outcome: Progressing  Flowsheets (Taken 3/29/2023 0915)  Verbalizes/displays adequate comfort level or baseline comfort level:   Encourage patient to monitor pain and request assistance   Assess pain using appropriate pain scale   Administer analgesics based on type and severity of pain and evaluate response   Implement non-pharmacological measures as appropriate and evaluate response     Problem: Skin/Tissue Integrity  Goal: Absence of new skin breakdown  Description: 1. Monitor for areas of redness and/or skin breakdown  2. Assess vascular access sites hourly  3. Every 4-6 hours minimum:  Change oxygen saturation probe site  4. Every 4-6 hours:  If on nasal continuous positive airway pressure, respiratory therapy assess nares and determine need for appliance change or resting period. Outcome: Progressing  Note: Pt.  Remains free from new skin breakdown     Problem: Respiratory - Adult  Goal: Clear lung sounds  Outcome: Progressing  Note: Cough and deep breathe resources  Outcome: Progressing  Flowsheets (Taken 3/30/2023 0830)  Discharge to home or other facility with appropriate resources:   Identify barriers to discharge with patient and caregiver   Arrange for needed discharge resources and transportation as appropriate   Identify discharge learning needs (meds, wound care, etc)     Problem: Nutrition Deficit:  Goal: Optimize nutritional status  Outcome: Progressing  Flowsheets (Taken 3/29/2023 0314 by Molly Barrett RN)  Nutrient intake appropriate for improving, restoring, or maintaining nutritional needs: Assess nutritional status and recommend course of action   Care plan reviewed with patient. Patient verbalizes understanding of the plan of care and contribute to goal setting.

## 2023-03-30 NOTE — PROGRESS NOTES
[facility]  OCCUPATIONAL THERAPY MISSED TREATMENT NOTE  [facility] ON[facility] MED 5K  5K-005-A      Date: 3/30/2023  Patient Name: Ian Baker        CSN: [id number]   : 1940  (79 y.o.)  Gender: male   Referring Practitioner: Dr. Sanjana Rodríguez MD  Diagnosis: pneumonia due to infectious organism         REASON FOR MISSED TREATMENT:  pt had just finished up with PT and was to tired to continue with OT. Pt wanted to just wait until tomorrow to continue therapy.   Will attempt again tomorrow as time allows

## 2023-03-30 NOTE — PLAN OF CARE
Problem: Safety - Adult  Goal: Free from fall injury  3/29/2023 2320 by Jcarlos Cross RN  Outcome: Progressing     Problem: Chronic Conditions and Co-morbidities  Goal: Patient's chronic conditions and co-morbidity symptoms are monitored and maintained or improved  3/29/2023 2320 by Jcarlos Cross RN  Outcome: Progressing  4 H Jose Willis (Taken 3/29/2023 2055)  Care Plan - Patient's Chronic Conditions and Co-Morbidity Symptoms are Monitored and Maintained or Improved:   Monitor and assess patient's chronic conditions and comorbid symptoms for stability, deterioration, or improvement   Collaborate with multidisciplinary team to address chronic and comorbid conditions and prevent exacerbation or deterioration   Update acute care plan with appropriate goals if chronic or comorbid symptoms are exacerbated and prevent overall improvement and discharge     Problem: Pain  Goal: Verbalizes/displays adequate comfort level or baseline comfort level  3/29/2023 2320 by Jcarlos Cross RN  Outcome: Progressing     Problem: Skin/Tissue Integrity  Goal: Absence of new skin breakdown  Description: 1. Monitor for areas of redness and/or skin breakdown  2. Assess vascular access sites hourly  3. Every 4-6 hours minimum:  Change oxygen saturation probe site  4. Every 4-6 hours:  If on nasal continuous positive airway pressure, respiratory therapy assess nares and determine need for appliance change or resting period.   3/29/2023 2320 by Jcarlos Cross RN  Outcome: Progressing

## 2023-03-30 NOTE — CARE COORDINATION
3/30/23, 12:13 PM EDT    DISCHARGE ON [address] day: 13  Location: -05/005-A Reason for admit: Hypercalcemia [E83.52]  Hypomagnesemia [E83.42]  Generalized weakness [R53.1]  Elevated troponin [R77.8]  Pneumonia due to infectious organism, unspecified laterality, unspecified part of lung [J18.9]  Acute congestive heart failure, unspecified heart failure type ([facility] [address] [I50.9]   Procedure:   3/21/2023 ultrasound guided right thoracentesis- 0.3 L fluid removed  3/22/2023 CT guided liver mass biopsy  3/23/2023 ultrasound guided thoracentesis, 0.475 L fluid removed. Barriers to Discharge: Eliquis, IV Bumex twice daily, daily CBC, CMP, and Magnesium, PT/OT, SS, Palliative Care. PCP: Marianne Carvajal MD  Readmission Risk Score: 16%  Patient Goals/Plan/Treatment Preferences: Pre-cert initiated for [facility] [address]

## 2023-03-31 VITALS
HEIGHT: 73 IN | SYSTOLIC BLOOD PRESSURE: 125 MMHG | WEIGHT: 148 LBS | OXYGEN SATURATION: 99 % | BODY MASS INDEX: 19.61 KG/M2 | TEMPERATURE: 97.3 F | RESPIRATION RATE: 16 BRPM | HEART RATE: 60 BPM | DIASTOLIC BLOOD PRESSURE: 69 MMHG

## 2023-03-31 LAB
ALBUMIN SERPL BCG-MCNC: 4.1 G/DL (ref 3.5–5.1)
ALP SERPL-CCNC: 168 U/L (ref 38–126)
ALT SERPL W/O P-5'-P-CCNC: 8 U/L (ref 11–66)
ANION GAP SERPL CALC-SCNC: 13 MEQ/L (ref 8–16)
AST SERPL-CCNC: 46 U/L (ref 5–40)
BASOPHILS ABSOLUTE: 0.1 THOU/MM3 (ref 0–0.1)
BASOPHILS NFR BLD AUTO: 0.8 %
BILIRUB SERPL-MCNC: 0.7 MG/DL (ref 0.3–1.2)
BUN SERPL-MCNC: 26 MG/DL (ref 7–22)
CALCIUM SERPL-MCNC: 11.4 MG/DL (ref 8.5–10.5)
CHLORIDE SERPL-SCNC: 96 MEQ/L (ref 98–111)
CO2 SERPL-SCNC: 24 MEQ/L (ref 23–33)
CREAT SERPL-MCNC: 0.5 MG/DL (ref 0.4–1.2)
DEPRECATED RDW RBC AUTO: 52.3 FL (ref 35–45)
EOSINOPHIL NFR BLD AUTO: 3.3 %
EOSINOPHILS ABSOLUTE: 0.5 THOU/MM3 (ref 0–0.4)
ERYTHROCYTE [DISTWIDTH] IN BLOOD BY AUTOMATED COUNT: 15 % (ref 11.5–14.5)
GFR SERPL CREATININE-BSD FRML MDRD: > 60 ML/MIN/1.73M2
GLUCOSE SERPL-MCNC: 111 MG/DL (ref 70–108)
HCT VFR BLD AUTO: 38 % (ref 42–52)
HGB BLD-MCNC: 12.2 GM/DL (ref 14–18)
IMM GRANULOCYTES # BLD AUTO: 0.07 THOU/MM3 (ref 0–0.07)
IMM GRANULOCYTES NFR BLD AUTO: 0.4 %
LYMPHOCYTES ABSOLUTE: 1.6 THOU/MM3 (ref 1–4.8)
LYMPHOCYTES NFR BLD AUTO: 10.3 %
MAGNESIUM SERPL-MCNC: 1.7 MG/DL (ref 1.6–2.4)
MCH RBC QN AUTO: 30.5 PG (ref 26–33)
MCHC RBC AUTO-ENTMCNC: 32.1 GM/DL (ref 32.2–35.5)
MCV RBC AUTO: 95 FL (ref 80–94)
MONOCYTES ABSOLUTE: 1.5 THOU/MM3 (ref 0.4–1.3)
MONOCYTES NFR BLD AUTO: 9.8 %
NEUTROPHILS NFR BLD AUTO: 75.4 %
NRBC BLD AUTO-RTO: 0 /100 WBC
PLATELET # BLD AUTO: 308 THOU/MM3 (ref 130–400)
PMV BLD AUTO: 10.1 FL (ref 9.4–12.4)
POTASSIUM SERPL-SCNC: 4.1 MEQ/L (ref 3.5–5.2)
PROT SERPL-MCNC: 6.8 G/DL (ref 6.1–8)
RBC # BLD AUTO: 4 MILL/MM3 (ref 4.7–6.1)
SARS-COV-2 RDRP RESP QL NAA+PROBE: NOT  DETECTED
SEGMENTED NEUTROPHILS ABSOLUTE COUNT: 11.8 THOU/MM3 (ref 1.8–7.7)
SODIUM SERPL-SCNC: 133 MEQ/L (ref 135–145)
WBC # BLD AUTO: 15.7 THOU/MM3 (ref 4.8–10.8)

## 2023-03-31 PROCEDURE — 2500000003 HC RX 250 WO HCPCS

## 2023-03-31 PROCEDURE — 2580000003 HC RX 258: Performed by: HOSPITALIST

## 2023-03-31 PROCEDURE — 36415 COLL VENOUS BLD VENIPUNCTURE: CPT

## 2023-03-31 PROCEDURE — 6370000000 HC RX 637 (ALT 250 FOR IP)

## 2023-03-31 PROCEDURE — 80053 COMPREHEN METABOLIC PANEL: CPT

## 2023-03-31 PROCEDURE — 97110 THERAPEUTIC EXERCISES: CPT

## 2023-03-31 PROCEDURE — 6370000000 HC RX 637 (ALT 250 FOR IP): Performed by: HOSPITALIST

## 2023-03-31 PROCEDURE — 99239 HOSP IP/OBS DSCHRG MGMT >30: CPT | Performed by: INTERNAL MEDICINE

## 2023-03-31 PROCEDURE — 85025 COMPLETE CBC W/AUTO DIFF WBC: CPT

## 2023-03-31 PROCEDURE — 87635 SARS-COV-2 COVID-19 AMP PRB: CPT

## 2023-03-31 PROCEDURE — 83735 ASSAY OF MAGNESIUM: CPT

## 2023-03-31 PROCEDURE — 6370000000 HC RX 637 (ALT 250 FOR IP): Performed by: INTERNAL MEDICINE

## 2023-03-31 RX ORDER — MULTIVITAMIN WITH IRON
1 TABLET ORAL DAILY
Qty: 30 TABLET | Refills: 0 | Status: SHIPPED | OUTPATIENT
Start: 2023-04-01 | End: 2023-03-31 | Stop reason: SDUPTHER

## 2023-03-31 RX ORDER — LOPERAMIDE HYDROCHLORIDE 2 MG/1
2 CAPSULE ORAL PRN
Qty: 10 CAPSULE | Refills: 0 | DISCHARGE
Start: 2023-03-31

## 2023-03-31 RX ORDER — ONDANSETRON 4 MG/1
4 TABLET, ORALLY DISINTEGRATING ORAL DAILY PRN
Qty: 30 TABLET | Refills: 0 | DISCHARGE
Start: 2023-03-31

## 2023-03-31 RX ORDER — CALCIUM POLYCARBOPHIL 625 MG 625 MG/1
1250 TABLET ORAL DAILY
Refills: 4 | DISCHARGE
Start: 2023-04-01

## 2023-03-31 RX ORDER — ONDANSETRON 4 MG/1
4 TABLET, ORALLY DISINTEGRATING ORAL DAILY PRN
Qty: 30 TABLET | Refills: 0 | Status: SHIPPED | OUTPATIENT
Start: 2023-03-31 | End: 2023-03-31 | Stop reason: SDUPTHER

## 2023-03-31 RX ORDER — LACTOBACILLUS RHAMNOSUS GG 10B CELL
1 CAPSULE ORAL DAILY
Qty: 30 CAPSULE | Refills: 0 | DISCHARGE
Start: 2023-03-31 | End: 2023-04-30

## 2023-03-31 RX ORDER — MULTIVITAMIN WITH IRON
1 TABLET ORAL DAILY
Qty: 30 TABLET | Refills: 0 | DISCHARGE
Start: 2023-04-01 | End: 2023-05-01

## 2023-03-31 RX ORDER — AMPICILLIN TRIHYDRATE 250 MG
200 CAPSULE ORAL 2 TIMES DAILY
Qty: 180 CAPSULE | Refills: 1 | DISCHARGE
Start: 2023-03-31

## 2023-03-31 RX ORDER — LACTOBACILLUS RHAMNOSUS GG 10B CELL
1 CAPSULE ORAL DAILY
Qty: 30 CAPSULE | Refills: 0 | Status: SHIPPED | OUTPATIENT
Start: 2023-03-31 | End: 2023-03-31 | Stop reason: SDUPTHER

## 2023-03-31 RX ORDER — LOPERAMIDE HYDROCHLORIDE 2 MG/1
2 CAPSULE ORAL PRN
Qty: 10 CAPSULE | Refills: 0 | Status: SHIPPED | OUTPATIENT
Start: 2023-03-31 | End: 2023-03-31 | Stop reason: SDUPTHER

## 2023-03-31 RX ORDER — BUMETANIDE 2 MG/1
2 TABLET ORAL DAILY PRN
Qty: 30 TABLET | Refills: 3 | Status: SHIPPED | OUTPATIENT
Start: 2023-03-31 | End: 2023-03-31 | Stop reason: SDUPTHER

## 2023-03-31 RX ORDER — CALCIUM POLYCARBOPHIL 625 MG 625 MG/1
1250 TABLET ORAL DAILY
Refills: 4 | COMMUNITY
Start: 2023-04-01 | End: 2023-03-31 | Stop reason: SDUPTHER

## 2023-03-31 RX ORDER — BUMETANIDE 2 MG/1
2 TABLET ORAL DAILY PRN
Qty: 30 TABLET | Refills: 3 | DISCHARGE
Start: 2023-03-31

## 2023-03-31 RX ADMIN — SODIUM CHLORIDE, PRESERVATIVE FREE 10 ML: 5 INJECTION INTRAVENOUS at 08:58

## 2023-03-31 RX ADMIN — ROSUVASTATIN 5 MG: 10 TABLET, FILM COATED ORAL at 08:57

## 2023-03-31 RX ADMIN — BUMETANIDE 1 MG: 0.25 INJECTION INTRAMUSCULAR; INTRAVENOUS at 08:57

## 2023-03-31 RX ADMIN — CARVEDILOL 25 MG: 25 TABLET, FILM COATED ORAL at 08:57

## 2023-03-31 RX ADMIN — APIXABAN 5 MG: 5 TABLET, FILM COATED ORAL at 08:57

## 2023-03-31 RX ADMIN — GEMFIBROZIL 600 MG: 600 TABLET ORAL at 06:51

## 2023-03-31 RX ADMIN — Medication 1 CAPSULE: at 08:57

## 2023-03-31 RX ADMIN — FINASTERIDE 5 MG: 5 TABLET, FILM COATED ORAL at 08:57

## 2023-03-31 RX ADMIN — MESALAMINE 800 MG: 400 CAPSULE, DELAYED RELEASE ORAL at 08:57

## 2023-03-31 RX ADMIN — Medication 1 TABLET: at 08:57

## 2023-03-31 RX ADMIN — PANTOPRAZOLE SODIUM 40 MG: 40 TABLET, DELAYED RELEASE ORAL at 06:51

## 2023-03-31 RX ADMIN — CALCIUM POLYCARBOPHIL 1250 MG: 625 TABLET, FILM COATED ORAL at 08:57

## 2023-03-31 ASSESSMENT — PAIN DESCRIPTION - DESCRIPTORS: DESCRIPTORS: DULL

## 2023-03-31 ASSESSMENT — PAIN SCALES - GENERAL: PAINLEVEL_OUTOF10: 2

## 2023-03-31 ASSESSMENT — PAIN DESCRIPTION - LOCATION: LOCATION: BACK

## 2023-03-31 NOTE — PLAN OF CARE
Problem: Safety - Adult  Goal: Free from fall injury  3/31/2023 0309 by Henri Mann RN  Outcome: Progressing  Flowsheets (Taken 3/29/2023 0314 by Don Esparza RN)  Free From Fall Injury: Instruct family/caregiver on patient safety     Problem: ABCDS Injury Assessment  Goal: Absence of physical injury  3/31/2023 0309 by Henri Mann RN  Outcome: Progressing  Flowsheets (Taken 3/29/2023 0314 by Don Esparza RN)  Absence of Physical Injury: Implement safety measures based on patient assessment     Problem: Chronic Conditions and Co-morbidities  Goal: Patient's chronic conditions and co-morbidity symptoms are monitored and maintained or improved  3/31/2023 0309 by Henri Mann RN  Outcome: Progressing  Flowsheets (Taken 3/30/2023 0830)  Care Plan - Patient's Chronic Conditions and Co-Morbidity Symptoms are Monitored and Maintained or Improved: Monitor and assess patient's chronic conditions and comorbid symptoms for stability, deterioration, or improvement     Problem: Pain  Goal: Verbalizes/displays adequate comfort level or baseline comfort level  3/31/2023 0309 by Henri Mann RN  Outcome: Progressing  Flowsheets (Taken 3/29/2023 0915)  Verbalizes/displays adequate comfort level or baseline comfort level:   Encourage patient to monitor pain and request assistance   Assess pain using appropriate pain scale   Administer analgesics based on type and severity of pain and evaluate response   Implement non-pharmacological measures as appropriate and evaluate response     Problem: Skin/Tissue Integrity  Goal: Absence of new skin breakdown  Description: 1. Monitor for areas of redness and/or skin breakdown  2. Assess vascular access sites hourly  3. Every 4-6 hours minimum:  Change oxygen saturation probe site  4.   Every 4-6 hours:  If on nasal continuous positive airway pressure, respiratory therapy assess nares and determine need for appliance change or resting period. 3/31/2023 0309 by Everton Corral RN  Outcome: Progressing  Note: Pt.  Remains free from new skin breakdown     Problem: Respiratory - Adult  Goal: Clear lung sounds  3/31/2023 0309 by Everton Corral RN  Outcome: Progressing  Note: Cough and deep breathe      Problem: Cardiovascular - Adult  Goal: Maintains optimal cardiac output and hemodynamic stability  3/31/2023 0309 by Everton Corral RN  Outcome: Progressing  Flowsheets (Taken 3/30/2023 0830)  Maintains optimal cardiac output and hemodynamic stability: Monitor blood pressure and heart rate     Problem: Musculoskeletal - Adult  Goal: Return mobility to safest level of function  3/31/2023 0309 by Everton Corral RN  Outcome: Progressing  Flowsheets (Taken 3/30/2023 0830)  Return Mobility to Safest Level of Function:   Assess patient stability and activity tolerance for standing, transferring and ambulating with or without assistive devices   Assist with transfers and ambulation using safe patient handling equipment as needed     Problem: Gastrointestinal - Adult  Goal: Minimal or absence of nausea and vomiting  3/31/2023 0309 by Everton Corral RN  Outcome: Progressing  Flowsheets (Taken 3/30/2023 0830)  Minimal or absence of nausea and vomiting: Administer IV fluids as ordered to ensure adequate hydration  Goal: Maintains or returns to baseline bowel function  3/31/2023 0309 by Everton Corral RN  Outcome: Progressing  Flowsheets (Taken 3/30/2023 0830)  Maintains or returns to baseline bowel function: Assess bowel function     Problem: Genitourinary - Adult  Goal: Absence of urinary retention  3/31/2023 0309 by Everton Corral RN  Outcome: Progressing  Flowsheets (Taken 3/30/2023 0830)  Absence of urinary retention: Assess patients ability to void and empty bladder     Problem: Discharge Planning  Goal: Discharge to home or other facility with appropriate

## 2023-03-31 NOTE — PROGRESS NOTES
Comprehensive Nutrition Assessment    Type and Reason for Visit:  Reassess    Nutrition Recommendations/Plan:   Continue Ensure Enlive TID. Recommend continue MVI. Rx per MD to assist with stooling  Encouraged po, good nutrition at best efforts. Discussed appropriate use of ONS and encouraged food from home/outside. Malnutrition Assessment:  Malnutrition Status:  Severe malnutrition (03/23/23 1033)    Context:  Acute Illness     Findings of the 6 clinical characteristics of malnutrition:  Energy Intake:  50% or less of estimated energy requirements for 5 or more days  Weight Loss:   (-15.5% in 5 months per EMR)     Body Fat Loss: Moderate body fat loss Orbital   Muscle Mass Loss: Moderate muscle mass loss Temples (temporalis), Clavicles (pectoralis & deltoids)  Fluid Accumulation:  Unable to assess     Strength:  Not Performed    Nutrition Assessment:     At risk for nutritional compromise r/t severe malnutrition, increased nutrient needs for wound healing, admit with pneumonia, HF, new diagnosis liver cancer and underlying medical condition (hx CAD, CHF, GERD, HLD). Nutrition Related Findings:      Wound Type: Stage II     Pt. Report/Treatments/Miscellaneous: pt. Seen w/ family; reports eating ok if likes the food; states acceptance of ONS; family bringing in food from home; MD noting diarrhea - Fibercon added; plan ECF at discharge; Palliative care on case  GI Status: 2 BMs noted past 24 hours  Pertinent Labs: 3/31: Sodium 133, Potassium 4.1, BUN 26, Cr 0.5, Glucose 111  Pertinent Meds: MVI, Fibercon, Imodium, Culturelle, Zofran, Lopid, Crestor      Current Nutrition Intake & Therapies:    Average Meal Intake: 51-75%, %  Average Supplements Intake: %  ADULT ORAL NUTRITION SUPPLEMENT; Breakfast, Dinner, Lunch; Standard High Calorie/High Protein Oral Supplement  ADULT DIET;  Regular; 1500 ml; Gluten Free, Vegetarian (Lacto-Ovo)    Anthropometric Measures:  Height: 6' 1\" (185.4 cm)  Ideal

## 2023-03-31 NOTE — DISCHARGE SUMMARY
diagnosis has become the more urgent medical issue at hand     Community-acquired pneumonia  Large bilateral pleural effusions, resolved  - CXR imaging on arrival demonstrated retrocardiac infiltrates  - Pneumonia panel positive for E. coli, staph and Moraxella  - Completed Zithromax/ceftriaxone for 7 days (started 3/17 and end 3/22)  - CTA chest on 3/20 demonstrated large bilateral pleural effusions, s/p right-sided thoracentesis and left thoracentesis. Atrial fibrillation, controlled:  - Status post pacemaker placement  - Eliquis resumed s/p biopsy    Hyponatremia, improved:    Hypokalemia, resolved:    Hypomagnesemia, improved:    Hypercalcemia, mild:    Leukocytosis, stable:    Acute normocytic anemia, stable, improved:    Pilonidal cyst:  - Outpatient surgery/work up    Generalized weakness:  - PT/OT, suspected malnutrition/cancer etiology  - High protein supplementation     Severe Malnutrition:  - High protein supplementation and other dietary supplements including multivitamin    The patient was seen and examined on day of discharge and this discharge summary is in conjunction with any daily progress note from day of discharge. Hospital Course:   Parris Gordon is a 79 y.o. male admitted to [address] [place] [address] on 3/17/2023 for Weakness. As noted per HPI:  \"The patient is a 79 y.o. male who presents with complaints of generalized weakness. Patient reports that he has not been feeling well for the last couple weeks. Patient reports that he has had a cough that was initially dry now productive sometimes of pink or clear sputum. He is on Eliquis for blood thinners. Family is also at bedside endorse that he has had a poor appetite ever since he was treated for Shigella. Patient denies any fevers or chills, nausea or vomiting. Denies any abdominal pain or diarrhea.   Patient reports that today he was unable to lift himself off the toilet, EMS was called today for lift assistance and then discharge to ECF/SNF after course of treatment is agreed upon through consultation with Heme/onc and palliative care. Adding multivitamin per recommendation of dietitian.     3/28/2023 - Family asked for second opinion from Dr. Trace Sherman (heme/onc). CEA and PSA non-contributory, AFP pending. Ongoing goals of care discussions. 3/29/2023 - Family discussed case with Dr. Trace Sherman and palliative care further. Patient and family agreeable to discharge to subacute SNF to rebuild patient strength, and will hold off on pursing chemotherapy pending further workup with Dr. Trace Sherman and therapy directed based on pathology/biopsy testing results understanding all treatment options are palliative in nature and not curative. Social work updated, pursuing precert and discharge. SW met with pt to verify preference for [facility] as first choice and [facility] as 2nd, pt, wife, and daughter all agree. 3/30/2023 - Spoke to patient and family regarding outpatient plan of care. Agreeable to SNF without chemotherapy for rehabilitation while awaiting information to guide treatment plans later on with Dr. Trace Sherman. Added 10 mEq fo K-Cl to supplements given difficulty maintaining goal of >4 K and > 2 Mg. Added metamucil to help normalized BM    3/31/2023 - Patient discharged in stable and improved condition. Newly diagnose adenocarcinoma on liver biopsy. Will need follow up with Dr. Trace Sherman.       Exam:     Vitals:  Vitals:    03/30/23 2145 03/31/23 0045 03/31/23 0351 03/31/23 0830   BP: 118/60 128/60 (!) 110/57 125/69   Pulse: 60 60 60 60   Resp: 16 16 16 16   Temp: 98.3 °F (36.8 °C) 97.6 °F (36.4 °C) 97.7 °F (36.5 °C) 97.3 °F (36.3 °C)   TempSrc: Oral Oral Oral Axillary   SpO2: 96% 100% 98% 99%   Weight:       Height:       PF:         Weight: Weight: 148 lb (67.1 kg)     24 hour intake/output:  Intake/Output Summary (Last 24 hours) at 3/31/2023 1722  Last data filed at 3/31/2023 0651  Gross per 24 hour   Intake 400 ml

## 2023-03-31 NOTE — PROGRESS NOTES
[address] [address]  INPATIENT PHYSICAL THERAPY  DAILY NOTE  [facility] ON[facility] MED 5K - 5K-05005-A    Time In: 3245  Time Out: 1113  Timed Code Treatment Minutes: 14 Minutes  Minutes: 14          Date: 3/31/2023  Patient Name: Jemma Hoff,  Gender:  male        MRN: [id number]  : 1940  (79 y.o.)     Referring Practitioner: Martin Chu MD  Diagnosis: Hypercalcemia  Additional Pertinent Hx: 79 y.o. male who presents with complaints of generalized weakness. Patient reports that he has not been feeling well for the last couple weeks. Patient reports that he has had a cough that was initially dry now productive sometimes of pink or clear sputum. CV surgery recommnending TAVR d/t severe symptomatic aortic stenosis. --liver mass biopsy 3/22--Metastatic malignancy confirmed, invasive adenocarcinoma. Prior Level of Function:  Lives With: Spouse  Type of Home: House  Home Layout: One level  Home Access: Stairs to enter with rails  Entrance Stairs - Number of Steps: 1 threshold  Home Equipment: Shantanu Sero, standard, Arbutus Showman, 4 wheeled, Lift chair   Bathroom Shower/Tub: Walk-in shower  Bathroom Toilet: Handicap height  Bathroom Equipment: Grab bars in shower, Toilet raiser  Bathroom Accessibility: Accessible (has to park 4 w/w outside [facility])    [address] Help From: Family  ADL Assistance: Independent (except assist for socks)  [address]: Independent  Homemaking Responsibilities: Yes  Ambulation Assistance: Independent  Transfer Assistance: Independent  Active : No  Additional Comments: patient used 4 w/w around home. spouse is in good health to assist as needed. Restrictions/Precautions:  Restrictions/Precautions: General Precautions, Fall Risk     SUBJECTIVE: Patient was lying in bed on arrival for physical therapy session. Patient A&O x2 with LINDA Upton treatment at this time. Patient voiced he is hoping to be discharged today to the [place] [address].  Patient agreeable to skilled therapy services at this time. PAIN: 4-5/10: sacrum near his pressure ulcer    Vitals: Oxygen: 96% on room air  Heart Rate: 60 bpm    OBJECTIVE:  Bed Mobility:  Not Tested; Patient deferred due to increased fatigue and muscular weakness. Encouragement provided from therapist but patient declined due to patient stating he sat edge of bed for awhile this morning to receive his medications. Transfers:  Not Tested; Patient deferred due to increased fatigue and muscular weakness. Encouragement provided from therapist but patient continued to decline. Ambulation:  Not performed per patient request due to increased fatigue and muscular weakness. Exercise:  Patient was guided in 1 set(s) 10 reps of exercise to both lower extremities. Ankle pumps, Glut sets, Quad sets, Heelslides, Short arc quads, Hip abduction/adduction, and Straight leg raises (AAROM for R LE, AROM for L LE). Exercises were completed for increased independence with functional mobility. Functional Outcome Measures: Completed  -PAC Inpatient Mobility without Stair Climbing Raw Score : 13  AM-PAC Inpatient without Stair Climbing T-Scale Score : 38.96    ASSESSMENT:  Assessment: Patient demonstrated increased fatigue and muscular weakness. Patient only completed supine exercises in bed secondary to fatigue/weakness and required assistance from therapist to complete SLR's on right lower extremity. Muscular deficits present greater in patient's right lower extremity than his left lower extremity during session. Increased fatigue displayed upon exertion with patient requiring short therapeutic rest breaks. Education given during rest breaks. Patient overall displays a decreased functional activity tolerance, strength deficits, and balance impairments due recent diagnosis of metastatic malignant adenocarcinoma.  Post session patient was lying in bed with call light at his side and bed alarm donned; no needs from therapist at this time with

## 2023-03-31 NOTE — PLAN OF CARE
Problem: Safety - Adult  Goal: Free from fall injury  3/31/2023 1056 by Ger Saucedo RN  Outcome: Progressing   All fall precautions in place. Bed in low position, alarm activated and appropriate use of call light. Problem: Chronic Conditions and Co-morbidities  Goal: Patient's chronic conditions and co-morbidity symptoms are monitored and maintained or improved  3/31/2023 1056 by Ger Saucedo RN  Outcome: Progressing  Flowsheets (Taken 3/31/2023 0830)  Care Plan - Patient's Chronic Conditions and Co-Morbidity Symptoms are Monitored and Maintained or Improved:   Monitor and assess patient's chronic conditions and comorbid symptoms for stability, deterioration, or improvement   Collaborate with multidisciplinary team to address chronic and comorbid conditions and prevent exacerbation or deterioration   Update acute care plan with appropriate goals if chronic or comorbid symptoms are exacerbated and prevent overall improvement and discharge     Problem: Pain  Goal: Verbalizes/displays adequate comfort level or baseline comfort level  3/31/2023 1056 by Ger Saucedo RN  Outcome: Progressing  Flowsheets (Taken 3/31/2023 0830)  Verbalizes/displays adequate comfort level or baseline comfort level:   Encourage patient to monitor pain and request assistance   Assess pain using appropriate pain scale   Administer analgesics based on type and severity of pain and evaluate response   Implement non-pharmacological measures as appropriate and evaluate response     Problem: Skin/Tissue Integrity  Goal: Absence of new skin breakdown  Description: 1. Monitor for areas of redness and/or skin breakdown  2. Assess vascular access sites hourly  3. Every 4-6 hours minimum:  Change oxygen saturation probe site  4. Every 4-6 hours:  If on nasal continuous positive airway pressure, respiratory therapy assess nares and determine need for appliance change or resting period.   3/31/2023 1056 by Ger Saucedo RN  Outcome:

## 2023-03-31 NOTE — CARE COORDINATION
3/31/23, 10:11 AM EDT    DISCHARGE PLANNING EVALUATION    Call to [facility] with [facility], they can accept pt today, they will need a COVID test, nurse aware of this. Family will transport pt.     3/31/23, 10:11 AM EDT    Patient goals/plan/ treatment preferences discussed by  and . Patient goals/plan/ treatment preferences reviewed with patient/ family. Patient/ family verbalize understanding of discharge plan and are in agreement with goal/plan/treatment preferences. Understanding was demonstrated using the teach back method. AVS provided by RN at time of discharge, which includes all necessary medical information pertaining to the patients current course of illness, treatment, post-discharge goals of care, and treatment preferences. Services At/After Discharge: [facility] [place]       IMM Letter  IMM Letter given to Patient/Family/Significant other/Guardian/POA/by[de-identified] Supriya Peace RN Case Mgr. IMM Letter date given[de-identified] 03/31/23  IMM Letter time given[de-identified] 4507     SW faxed AVS, MAR, and COVID results to [facility]. Family transporting pt. Nurse will call report to facility.

## 2023-04-03 ENCOUNTER — TELEPHONE (OUTPATIENT)
Dept: CARDIOLOGY CLINIC | Age: 83
End: 2023-04-03

## 2023-04-03 NOTE — TELEPHONE ENCOUNTER
Called to [facility] to confirm pt's appt. Pt & Patient's wife wanted to cancel this appointment. Pt is trying to gain strength in nursing home and is too weak to come in for an appt.

## 2023-04-11 PROBLEM — R41.82 AMS (ALTERED MENTAL STATUS): Status: ACTIVE | Noted: 2023-01-01

## 2023-04-11 NOTE — ED TRIAGE NOTES
Pt presents to the ED via EMS for AMS that started when he woke up today. Upon arrival to the ED pt is alert and oriented. Pt is able to move all extremities and follow commands.  Pt failed swallow screen at [facility]

## 2023-04-11 NOTE — ED NOTES
MRI called and stated pt's pacemaker is not MRI compatible. Dr. Anila Wallace notified.      Berry Romero, RN  04/11/23 9207 caffeine Diagnostic testing not indicated for today's encounter

## 2023-04-11 NOTE — ED PROVIDER NOTES
[facility] [address] [address] EMERGENCY DEPT      EMERGENCY MEDICINE     Pt Name: Armen Chanel  MRN: [id number]  [place] 1940  Date of evaluation: 4/11/2023  Provider: Rocio Elder MD  Supervising Physician: Rosette Locke COMPLAINT       Chief Complaint   Patient presents with    Altered Mental Status     History obtained from the patient. HISTORY OF PRESENT ILLNESS   Armen Chanel is a pleasant 79 y.o. male who presents to the emergency department from from nursing home, brought in by EMS for evaluation of altered mentation. Patient went to bed last night at normal time, when they woke him up at about 9 AM, he appeared altered. Noted to have right lower extremity weakness. Patient oriented to date of birth and month, does appear confused, cannot hold conversation. Denies any complaints. Denies any additional complaints. Pertinent ROS included above.   PASTMEDICAL HISTORY     Past Medical History:   Diagnosis Date    Arthritis     Atrial fibrillation ([facility]     Atrial fibrillation ([facility] 07/08/2020    CAD (coronary artery disease)     CHF (congestive heart failure) ([facility]     Dyslipidemia 06/23/2021    Enlarged prostate 06/23/2021    GERD (gastroesophageal reflux disease)     Hyperlipidemia     Hypertension     [employer]onic dual pacemaker  11/8/2022       Patient Active Problem List   Diagnosis Code    Atrial fibrillation (HCC) I48.91    ASHD (arteriosclerotic heart disease) I25.10    Cholelithiasis K80.20    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

## 2023-04-11 NOTE — ED NOTES
ED to inpatient nurses report    Chief Complaint   Patient presents with    Altered Mental Status      Present to ED from nursing home  LOC:  alert and oriented to person place and time  Vital signs   Vitals:    04/11/23 1517 04/11/23 1628 04/11/23 1735 04/11/23 1803   BP: (!) 119/57 (!) 107/58 (!) 101/51 (!) 110/56   Pulse: 60 58 60 61   Resp: 16 16 16 18   Temp:       TempSrc:       SpO2: 99% 99% 97% 97%   Weight:       Height:          Oxygen Baseline room air    Current needs required room air Bipap/Cpap No  LDAs:   Peripheral IV 04/11/23 Left;Right Forearm (Active)   Site Assessment Clean, dry & intact 04/11/23 1344   Line Status Normal saline locked 04/11/23 1344   Dressing Status Clean, dry & intact 04/11/23 1344     Mobility: Requires assistance * 2  Pending ED orders: antibiotics  Present condition: stable      C-SSRS    Swallow Screening    Preferred Language: [place]     Electronically signed by Anna Garcia RN on 4/11/2023 at 6:15 PM       Anna Garcia RN  04/11/23 1886

## 2023-04-11 NOTE — H&P
electronically signed by Dr. Misa Forbes MD on 4/11/2023 3:06 PM    XR CHEST PORTABLE    Result Date: 4/11/2023  PROCEDURE: XR CHEST PORTABLE CLINICAL INFORMATION: stroke . TECHNIQUE: Portable semiupright COMPARISON: 3/23/2023 FINDINGS: Cardiomediastinal silhouette is normal. No infiltrates or effusions. Pulmonary vessels are not congested. AICD left chest. EKG leads overlie the chest.     Stable chest no acute cardiopulmonary disease **This report has been created using voice recognition software. It may contain minor errors which are inherent in voice recognition technology. ** Final report electronically signed by Dr. Karlene Osler on 4/11/2023 3:02 PM        Tele:   [x] yes             [] no      Thank you Corazon Cantrell MD for the opportunity to be involved in this patient's care.     Electronically signed by Shyam Armstrong PA-C on 4/11/2023 at 5:57 PM

## 2023-04-11 NOTE — ED NOTES
Pt transported to [facility] on cart in stable condition. Floor contacted and spoke with Elena Foley prior to transport.      Erma Handy  04/11/23 8035

## 2023-04-11 NOTE — NURSE NAVIGATOR
Code Stroke    Patient- Karen Goodson   003A   2023   Attending Physician- Liliane Montez MD    Code stroke called for sudden  Increased AMS from norm    Physician arrival- [phone] code stroke called at 26 with 12 min ETA  Team members   Primary RN-Guillauem   Neuro Team- [address] Tab[address] Joshua Jessicaankit Team-   [address] Tamara Lyles Well- 4/10/23     Time to CT scan- 1333     INITIAL NIH STROKE SCALE    Time Performed:  1330    1a. Level of consciousness:  0 - alert; keenly responsive  1b. Level of consciousness questions:  0 - answers both questions correctly  1c. Level of consciousness questions:  0 - performs both tasks correctly  2. Best Gaze:  0 - normal  3. Visual:  0 - no visual loss  4. Facial Palsy:  0 - normal symmetric movement  5a. Motor left arm:  0 - no drift, limb holds 90 (or 45) degrees for full 10 seconds  5b. Motor right arm:  0 - no drift, limb holds 90 (or 45) degrees for full 10 seconds  6a. Motor left le - no drift; leg holds 30 degree position for full 5 seconds  6b. Motor right le  7. Limb Ataxia:  0 - absent  8. Sensory:  0 - normal; no sensory loss  9. Best Language:  0 - no aphasia, normal  10. Dysarthria:  1 - mild to moderate, patient slurs at least some words and at worst, can be understood with some difficulty  11. Extinction and Inattention:  0 - no abnormality    TOTAL:  1    Outcome- Code stroke cancelled     Primary RNHarini, updated on POC and need for swallow screen before PO intake.     End time of Code Stroke- 5910 Code stroke Cancelled     Electronically signed by Armand Workman, RN, RN on 2023 at 2:41 PM

## 2023-04-12 NOTE — PLAN OF CARE
Problem: Discharge Planning  Goal: Discharge to home or other facility with appropriate resources  Outcome: [facility] Progressing     Problem: Safety - Adult  Goal: Free from fall injury  Outcome: [facility] Progressing     Problem: ABCDS Injury Assessment  Goal: Absence of physical injury  Outcome: [facility] Progressing     Problem: Skin/Tissue Integrity  Goal: Absence of new skin breakdown  Description: 1. Monitor for areas of redness and/or skin breakdown  2. Assess vascular access sites hourly  3. Every 4-6 hours minimum:  Change oxygen saturation probe site  4. Every 4-6 hours:  If on nasal continuous positive airway pressure, respiratory therapy assess nares and determine need for appliance change or resting period.   Outcome: [facility] Progressing

## 2023-04-12 NOTE — DISCHARGE INSTR - COC
Continuity of Care Form    Patient Name: Carlisle Spurling   :  1940  MRN:  [id number]    Admit date:  2023  Discharge date:  2023    Code Status Order: Full Code   Advance Directives:     Admitting Physician:  No admitting provider for patient encounter. PCP: Nathalie Magallanes MD    Discharging Nurse:  JOEL Dukes RN  [address] Unit/Room#: 4A-05/005-A  Discharging Unit Phone Number: [phone]    Emergency Contact:   Extended Emergency Contact Information  Primary Emergency Contact: Dee Gilbert  Address: [address], [address] Lugene Ang of [facility] Phone: [phone]  Relation: Spouse  Secondary Emergency Contact: Cristin Pooling [facility] of [facility] Phone: [phone]  Relation: Child    Past Surgical History:  Past Surgical History:   Procedure Laterality Date    BUNIONECTOMY Left     Donnamae Members      Dr. Allyssa Renner Left     Dr. Palma Lee      x2 Dr. Lisandro Matamoros  2016    Dr. Jc Escort  3/22/2023    CT NEEDLE BIOPSY LIVER PERCUTANEOUS 3/22/2023 [facility] CT SCAN    HERNIA REPAIR Right 's    inguinal    PACEMAKER INSERTION      Dr. Lyndon Wood  2016    Dr. Nicole Mohan       Immunization History:   Immunization History   Administered Date(s) Administered    COVID-19, PFIZER Bivalent BOOSTER, DO NOT Dilute, (age 12y+), IM, 30 mcg/0.3 mL 2022    COVID-19, PFIZER GRAY top, DO NOT Dilute, (age 15 y+), IM, 30 mcg/0.3 mL 2022    COVID-19, PFIZER PURPLE top, DILUTE for use, (age 15 y+), 30mcg/0.3mL 2021, 2021, 10/04/2021    Influenza Vaccine, unspecified formulation 10/14/2016    Influenza Virus Vaccine 2017    Influenza, AFLURIA (age 1 yrs+), FLUZONE, (age 10 mo+), MDV, 0.5mL 10/09/2018    Influenza, FLUAD, (age 72

## 2023-04-12 NOTE — PALLIATIVE CARE
Follow Up / Progress Note        Patient:   Georgina Aquino  YOB: 1940  Age:  79 y.o. Room:  [address] [place] [address]  MRN:  [id number]            Family/Patient Discussion:  Revisited Magdaleno Livingston and family to assess for questions or needs. Daughter reports they have not spoken to Dr. Lucio Underwood yet but are expecting him to call them after office hours today. Denied needs or questions at this time. Will revisit again tomorrow to assist with goals of care.         Electronically signed by Agatha Haynes RN on 4/12/2023 at [place] [address] Office: [phone]

## 2023-04-12 NOTE — PALLIATIVE CARE
Initial Evaluation          Patient:   Olu Medina  YOB: 1940  Age:  79 y.o. Room:  [address] [place] [address]  MRN:  [id number]   Acct: [de-identified]    Date of Admission:  4/11/2023  1:31 PM  Date of Service:  4/12/2023  Completed By:  Sam Gerard RN                 Reason for Palliative Care Evaluation:-             [x] Code Status Discussion              [x] Goals of Care              [] Pain/Symptom Management               [] Emotional Support              [] Other:                   Current Issues:-  []  Pain  [x]  Fatigue  []  Nausea  []  Anxiety  []  Depression  []  Shortness of Breath  []  Constipation  [x]  Appetite  []  Other:             Advance Directives:-  [] [place] DNR Form  [x] Living Will  [] Medical POA             Current Code Status:-  [x] Full Resuscitation  [] DNR-Comfort Care-Arrest  [] DNR-Comfort Care       [] Limited Resuscitation             [] No CPR            [] No shock            [] No ET intubation/reintubation            [] No resuscitative medications            [] Other limitation:  Code status was changed to LIMITED x4 after consult            Palliative Performance Status:        [] 100%  Full ambulation; normal activity and work; no evidence of disease; able to do own self care; normal intake; fully conscious     [] 90%   Full ambulation; normal activity and work; some evidence of disease; able to do own self care; normal intake; fully conscious    [] 80%   Full ambulation; normal activity with effort; some evidence of disease; able to do own self care; normal or reduced intake; fully conscious    [] 70%  Ambulation reduced; unable to perform normal job/work; significant  disease; able to do own self care; normal or reduced intake; fully conscious      [] 60%  Ambulation reduced; Significant disease;Can't do hobbies/housework; intake normal or reduced; occasional assist; LOC full/confusion        [] 50%  Mainly sit/lie;  Extensive

## 2023-04-12 NOTE — CONSULTS
Please refer to my original consult dated 3/28/23, as the informational background remains unchanged. The patient was readmitted 4/11/23 from [facility] due to mental status change. Patient is severely malnourished and has aspiration precautions and is NPO for now due to microaspiration of liquids. I read the dietary notes and reviewed the entire record to-date. The patient is not a further candidate for aggressive measures such as chemotherapy. Had a 30 minute discussion with the family and patient via telephone and discussed the issues at hand and that he is a Hospice candidate right now. The family and patient are in full agreement with this decision. There was no disagreement noted. I consulted Hospice care. He will need an ECF with Hospice most likely. I also discussed lifting the NPO restriction and allowing him to have whatever he wants by mouth noting the aspiration risk. No feeding tube! Available for questions if needed.
°C)      I/O (24Hr): No intake or output data in the 24 hours ending 04/11/23 1532      Physical Exam  Vitals reviewed. Constitutional:       Comments: Cachectic, chronically ill-appearing   HENT:      Head: Normocephalic and atraumatic. Right Ear: External ear normal.      Left Ear: External ear normal.      Nose: Nose normal.      Mouth/Throat:      Mouth: Mucous membranes are moist.      Pharynx: Oropharynx is clear. Eyes:      Extraocular Movements: Extraocular movements intact and EOM normal.      Pupils: Pupils are equal, round, and reactive to light. Cardiovascular:      Rate and Rhythm: Normal rate and regular rhythm. Pulmonary:      Effort: Pulmonary effort is normal. No respiratory distress. Abdominal:      General: There is no distension. Palpations: Abdomen is soft. Tenderness: There is no abdominal tenderness. Musculoskeletal:         General: No deformity or signs of injury. Cervical back: No rigidity or tenderness. Skin:     General: Skin is warm and dry. Neurological:      Mental Status: He is alert. Psychiatric:         Attention and Perception: He is inattentive. Mood and Affect: Affect normal.         Speech: Speech is delayed and slurred. Behavior: Behavior is cooperative. Cognition and Memory: Cognition is impaired. Comments: Perseverative     Neurologic Exam     Mental Status   Oriented to person. Oriented to place. Oriented to time. Attention: decreased. Speech: slurred   Level of consciousness: arousable by verbal stimuli  Able to repeat. Patient is alert to verbal stimuli and is oriented to person, place and month. He does demonstrate some difficulty with command following and is perseverative. His speech is dysarthric. Cranial Nerves     CN II   Right visual field deficit: Patient does blink to threat in all visual fields. CN III, IV, VI   Pupils are equal, round, and reactive to light.   Extraocular

## 2023-04-13 NOTE — PLAN OF CARE
Problem: Discharge Planning  Goal: Discharge to home or other facility with appropriate resources  Outcome: Progressing  Flowsheets (Taken 4/13/2023 0936)  Discharge to home or other facility with appropriate resources: Identify barriers to discharge with patient and caregiver     Problem: Safety - Adult  Goal: Free from fall injury  Outcome: Progressing  Flowsheets (Taken 4/13/2023 0936)  Free From Fall Injury: Instruct family/caregiver on patient safety     Problem: ABCDS Injury Assessment  Goal: Absence of physical injury  Outcome: Progressing  Flowsheets (Taken 4/13/2023 0936)  Absence of Physical Injury: Implement safety measures based on patient assessment     Problem: Skin/Tissue Integrity  Goal: Absence of new skin breakdown  Description: 1. Monitor for areas of redness and/or skin breakdown  2. Assess vascular access sites hourly  3. Every 4-6 hours minimum:  Change oxygen saturation probe site  4. Every 4-6 hours:  If on nasal continuous positive airway pressure, respiratory therapy assess nares and determine need for appliance change or resting period. Outcome: Progressing  Note: Assess skin once a shift and as needed.      Problem: Pain  Goal: Verbalizes/displays adequate comfort level or baseline comfort level  Outcome: Progressing  Flowsheets (Taken 4/13/2023 0936)  Verbalizes/displays adequate comfort level or baseline comfort level:   Encourage patient to monitor pain and request assistance   Assess pain using appropriate pain scale   Administer analgesics based on type and severity of pain and evaluate response   Implement non-pharmacological measures as appropriate and evaluate response   Consider cultural and social influences on pain and pain management   Notify Licensed Independent Practitioner if interventions unsuccessful or patient reports new pain     Problem: Chronic Conditions and Co-morbidities  Goal: Patient's chronic conditions and co-morbidity symptoms are monitored and

## 2023-04-13 NOTE — PALLIATIVE CARE
Follow Up / Progress Note        Patient:   Lakhwinder Velazquez  YOB: 1940  Age:  79 y.o. Room:  [facility]  MRN:  [id number]            Family/Patient Discussion:  Spoke with Jorge Perry at bedside. Crystal Montalvo was smiling and told me he had ice cream for breakfast. He stated he was feeling okay. Mello Garg stated she is waiting on her daughter to arrive and then meet with hospice later this morning. She denied questions or needs at this time.         Electronically signed by Rola Wang RN on 4/13/2023 at 10:30 AM             Palliative Care Office: [phone]

## 2023-04-14 NOTE — DISCHARGE SUMMARY
Hospital Medicine Discharge Summary      Patient Identification:   Quang Bills   : 1940  MRN: [id number]   Account: [de-identified]   Patient's PCP: Sherry Salgado MD    Admit Date: 2023   Discharge Date: 2023      Admitting Physician: No admitting provider for patient encounter. 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 male with PMHx adenocarcinoma vs cholangiocarcinoma with mets admitted to [address] [place] [address] on  for metabolic encephalopathy. I spoke with family wife and two daughters, .and I called and spoke with dr. Sangita Carver, as family requested his input. He recommended conservative treatment treatment including hospice, as he is not a candidate for any chemo or surgical resection secondary to stage IV metastatic adenocarcinoma of the liver/cholangiocarcinoma. Plus he is got significant comorbid conditions at his age considering his underlying cardiac issues, was discussed with the family several times and they are in agreement and he did go back to the nursing home today and they will initiate hospice at the facility. Was seen by our hospice team during this hospitalization along with palliative care team.         The patient was seen and examined on day of discharge and this discharge summary is in conjunction with any daily progress note from day of discharge. The patient is discharged in stable condition. , but long term prognosis   Is poor, family is aware of this along with the patient.        Exam:       Vitals:  Vitals:    23 0823 23 1600 23

## 2023-04-14 NOTE — CARE COORDINATION
4/12/23, 10:28 AM EDT  Discharge Planning Evaluation  Social work consult received, [facility] from [facility]. Patient's daughter Marisol Masters and Cristin's preference is to return to [facility]. The patient's current payor source at the facility is [facility]. Medicare skilled days available: yes  Insurance precert:  will be if he returns skilled, may go hospice. Spoke with Joaquín Marquez at the facility. Patient bed hold: yes  Anticipated transport plan: ambulance  Do they require COVID 19 test to return to [facility]  Is there a required time frame which which COVID test needs done:no  Patient's Healthcare Decision Maker: Legal Next of [address] [place] [address] are considering going [facility] the [facility] with [facility] for hospice. They would like to hear from Dr. Sarah Young before they make any decisions. Did discuss costs related to going back to the facility on hospice. Spoke with Joaquín Marquez at the facility and made her aware they may be considering hospice. Readmission Risk Low 0-14, Mod 15-19), High > 20: Readmission Risk Score: 22.2    Current PCP: iVoletta Garvey MD  PCP verified by CM? Yes    Patient Orientation: Unable to Assess (spoke with daughter Marisol Masters)    Patient Cognition: Other (see comment) (met with family has had some confusion)  History Provided by: Child/Family    Advance Directives:      Code Status: Limited   Patient's Primary Decision Maker is: Named in [address] [place] [address]    Primary Decision Maker: Juan Manuel Joy 32 - [phone]    Secondary Decision Maker: Violetta Michelle - Child - [phone]    Secondary Decision Maker: Sid Len - Child - [phone]     Discharge Planning:    Patient lives with: Other (Comment) (ECF) Type of Home: [place]  Primary Care Giver:  Other (Comment) (From and ECF)  Patient Support Systems include: Family Members, Children, Spouse/Significant Other, Friends/Neighbors   Current Financial resources: Medicare  Current community resources:
4/13/23, 12:04 PM EDT    DISCHARGE PLANNING EVALUATION    Jj Press from hospice left a message to make SW aware family have decided to return to [place] [address] with [facility]. Called and left a message for [facility]. Spoke with Krystin Solis at [facility] and they will be able to accept tomorrow morning. Update 1:40 pm: Set up transport for 10:00am tomorrow back to the [place] [address]. Krystin Solis is aware of transport time. Made referral to [facility]. Will update Nisreen MCKEON and family as to transport time.
4/14/23, 8:07 AM EDT    Patient goals/plan/ treatment preferences discussed by  and . Patient goals/plan/ treatment preferences reviewed with patient/ family. Patient/ family verbalize understanding of discharge plan and are in agreement with goal/plan/treatment preferences. Understanding was demonstrated using the teach back method. AVS provided by RN at time of discharge, which includes all necessary medical information pertaining to the patients current course of illness, treatment, post-discharge goals of care, and treatment preferences. Services At/After Discharge: [address], Aide services, Hospice, and In ambulance             Es Ahuja will be discharged to [address] [facility] where he will be hospice. Made the [facility] Cesar and family aware yesterday that transport is set up for 10:00 am today. Called [facility] and spoke with Elva Smith to make her aware discharge is today. She reports that they are ready. Es Ahuja will be transported by to the facility by ambulance.
Assistance with the following:, Bathing, Dressing, Toileting, Cooking, Housework, Shopping, Mobility  Current functional level: Assistance with the following:, Bathing, Dressing, Toileting, Cooking, Housework, Shopping, Mobility    Family can provide assistance at DC: Other (comment) (Return to [facility])  Would you like Case Management to discuss the discharge plan with any other family members/significant others, and if so, who? Yes  Plans to Return to Present Housing: Yes  Other Identified Issues/Barriers to RETURNING to current housing: None  Potential Assistance needed at discharge: Other (Comment) (Family discussing hospice, would want [facility])            Potential DME:    Patient expects to discharge to: Pavel Okeefe  for transportation at discharge:      Financial    Payor: Aramis Caruso / Plan: Roni Myers ESSENTIAL/PLUS / Product Type: *No Product type* /     Does insurance require precert for SNF: Yes    Potential assistance Purchasing Medications: No  Meds-to-Beds request: No      [facility] [place] - [address]  Phone: [phone] Fax: [phone]    [facility] - [address], [phone] Shoaib Mcclure [phone] [address] [facility]  [address] [address]  Phone: [phone] Fax: [phone]      Notes:    Factors facilitating achievement of predicted outcomes: Family support    Barriers to discharge: Medical complications and Medication managment    Additional Case Management Notes: Pt admitted through ER with AMS more than usual. Code stroke called in ER. (Failed swallow test at [facility]). On tele, NPO, PT/OT consulted, on Eliquis, neurology following, Palliative following-limited code status at this time.      Procedure: 4/11: CT head: No evidence of an acute process  4/11: CTA head/neck: No flow-limiting stenosis or aneurysm in the intracranial

## 2023-04-21 NOTE — PROGRESS NOTES
[address] [place] [employer] THERAPY MISSED TREATMENT NOTE  [facility] NEUROSCIENCES 4A  4A-05/005-A      Date: 2023  Patient Name: Jonny Headley        CSN: [id number]   : 1940  (79 y.o.)  Gender: male   Referring Practitioner: Destinee Churchill PA-C  Diagnosis: AMS         REASON FOR MISSED TREATMENT: Hold Treatment per Nursing. After family meeting, pt has decided to transition to hospice services. Will discontinue OT services at this time.
[address] THERAPY MISSED TREATMENT NOTE  [facility] NEUROSCIENCES 4A      Date: 2023  Patient Name: Conchita Knapp        MRN: [id number]    : 1940  (79 y.o.)    REASON FOR MISSED TREATMENT:      Contacted LINDA Yarbrough at [phone] via phone to discuss patients POC. Patient with code status change to [facility], pursuing hospice care. Oncologist, Dr. Ihsan Dominguez, lifted NPO restrictions to allow for better quality of life at this time. Not appropriate for skilled interventions at this time. Should changes present or family have further questions, please contact  #[phone].        Connor Sarmiento M.S. roxannaa Schw[place] [phone]
[facility] Laboratory Technician Worksheet      EEG Date: 2023    Name: Olu Medina   : 1940   Age: 79 y.o. SEX: male    ROOM: 4A18 MRN: [id number]           CSN: [id number]      Ordering Provider: YURIDIA abdi  EEG Number: [id number] Time of Test:  1286    Hand: Right   Sedation: no    H.V. Done: No  age  Photic: Yes    Sleep: Yes  Drowsy: Yes   Sleep Deprived: Yes    Seizures observed: no    Mentality: alert      Clinical History:hx of encephalopathy and afib. Having staring episodes, episodes of confusion and mild right sided weakness. CT of the head 2023  Impression    No evidence of an acute process. Results given to MARQUITA Jansen at 1:54 PM       CTA of the head 2023  Impression       1. No flow-limiting stenosis or aneurysm in the intracranial circulation. 2. Calcified and noncalcified mural plaque at the bilateral carotid bulbs/proximal internal carotid arteries with 65% stenosis on the right and 31% stenosis on the left by NASCET criteria. 3. Moderate stenosis at the takeoffs of the bilateral vertebral arteries. 4. Hyperdense material along the mucosal surface of the vallecula, glottis, supraglottic larynx and subglottic larynx possibly on the basis of previous aspiration.      Past Medical History:       Diagnosis Date    Arthritis     Atrial fibrillation ([address]     Atrial fibrillation ([address] 2020    CAD (coronary artery disease)     CHF (congestive heart failure) (HCC)     Dyslipidemia 2021    Enlarged prostate 2021    GERD (gastroesophageal reflux disease)     Hyperlipidemia     Hypertension     Medtronic dual pacemaker  2022       Scheduled Meds:   carvedilol  25 mg Oral BID WC    apixaban  5 mg Oral BID    finasteride  5 mg Oral Daily    gemfibrozil  600 mg Oral BID AC    lactobacillus  1 capsule Oral Daily    mesalamine  800 mg Oral Daily    pantoprazole  40 mg Oral Daily    polycarbophil  1,250 mg Oral Daily
Comprehensive Nutrition Assessment    Type and Reason for Visit:  Initial, Positive Nutrition Screen, NPO/Clear Liquid, Wound    Nutrition Recommendations/Plan:   Await goals of care. NPO per SLP, MAGGIE 4/11/23. If tubefeeding started would recommend: Jevity 1.5 at 15 ml/hr, increase 10 ml/hr every 8 hours (slowly d/t high refeeding syndrome risk) as tolerated to goal of 55 ml/hr (~ 1980 kcals, 84 grams protein, 285 grams CHO, 29 grams fiber, 1003 mls water in 1320 mls volume). When IVF stopped consider 120 mls free water flush every 6 hours (total volume with TF ~ 1800 mls/24 hours)     Malnutrition Assessment:  Malnutrition Status:  Severe malnutrition (04/12/23 1206)    Context:  Chronic Illness     Findings of the 6 clinical characteristics of malnutrition:  Energy Intake:  75% or less estimated energy requirements for 1 month or longer  Weight Loss:  Unable to assess (13% in the last 7 months however hard to assess with edema, CHF hx)     Body Fat Loss:  Severe body fat loss Orbital, Triceps, Fat Overlying Ribs   Muscle Mass Loss:  Severe muscle mass loss Temples (temporalis), Clavicles (pectoralis & deltoids), Thigh (quadraceps)  Fluid Accumulation:  Mild Extremities   Strength:  Not Performed    Nutrition Assessment:     Pt. severely malnourished AEB criteria listed above. At risk for further nutritional compromise r/t NPO d/t dysphagia, catabolic illness-newly diagnosed liver cancer, increased nutrient needs to support wound healing, admit with hypercalcemia suspected d/t dehydration and malignancy,  and underlying medical condition (hx: stage IV liver cancer, chronic diarrhea, afib, HLD, CHF, HTN, CAD, GERD). Nutrition Related Findings:    Pt. Report/Treatments/Miscellaneous: Patient, wife, and daughters seen.   Appetite started to decline~ 3-4 months ago, early satiety, food aversion to more and more foods not sounding/tasting good, sold food/medication dysphagia intermittently, recent
Discharge teaching and instructions for diagnosis/procedure of AMS completed with patient using teachback method. AVS reviewed. Printed prescriptions given to patient. Patient voiced understanding regarding prescriptions, follow up appointments, and care of self at home. Discharged via ambulance to skilled nursing per EMS transportation.
Discussed patient with hospitalist Dr. Shalom Gillespie today. Patient is electing hospice after consultation with oncologist. [facility] was cancelled and neuro will sign off. Please do not hesitate to call if we can be of any further assistance.      Electronically signed by Constance Neumann PA-C on 4/13/23 at 9:41 AM EDT
Follow up from referral on 04.12.2023 to answer questions and discuss plan of discharge home vs facility. Wife Richard Vaughan at bedside ask that nurse return around  when her daughter arrives. Nurse's number supplied for family to contact directly when she arrives. Updated primary nurses of above.
[facility] [address]  INPATIENT OCCUPATIONAL THERAPY  [facility] NEUROSCIENCES 4A  EVALUATION    Time:   Time In: 1102  Time Out: 1132  Timed Code Treatment Minutes: 20 Minutes  Minutes: 30          Date: 2023  Patient Name: Conchita Knapp,   Gender: male      MRN: [id number]  : 1940  (79 y.o.)  Referring Practitioner: Aracely Mckeon PA-C  Diagnosis: AMS  Additional Pertinent Hx: per chart review; Conchita Knapp is a 79 y.o. male with PMHx of Afib, CAD, CHF, HTN, AS, adenocarcinoma who presented to [facility] with chief complaint of fatigue, altered mental status. Patient is alert, oriented to self, place, somewhat to situation. He reports feeling fatigued. He reports some abdominal discomfort yesterday, since resolved. He reports diarrhea, this is chronic. He states he has had a mild productive cough. Patient has a rash, which the family states appeared after presentation to the ED. Patient reports mild itching. Family present at bedside. Patient's wife states that over the last 2 days, he has been more fatigued than usual.  Family states that he has very poor p.o. intake. They report coughing with eating previously, that he had a study done today at the nursing home and was told that he could have nothing by mouth. Family states that they have not established a plan for oncology yet. They state that they had seen Dr. Ihsan Dominguez who was unsure that it was an intrahepatic primary, so had the biopsy sent to outside hospital for further pathology review. They are awaiting the results. Family states that the goal was for him to go to nursing home for increased strength and possible palliative chemotherapy at a later date.     Restrictions/Precautions:  Restrictions/Precautions: Fall Risk, General Precautions    Subjective  Chart Reviewed: Yes, Orders, Progress Notes, History and Physical  Patient assessed for rehabilitation services?: Yes  Family / Caregiver Present: No    Subjective: RN approved session,
[facility]  PHYSICAL THERAPY MISSED TREATMENT NOTE  [facility] NEUROSCIENCES 4A    Date: 2023  Patient Name: Lakhwinder Velazquez        MRN: [id number]   : 1940  (79 y.o.)  Gender: male                REASON FOR MISSED TREATMENT:  Hold treatment per nursing request.      Family decided to transition o hospice. Will defer PT services .
Hospice visit made with full hospice referral being completed with wife Tanner Meade and daughter Missael Long. Diania Sensor resting in bed with no s/s of distress or discomfort, sleeps with mouth and eyes open. Hospice concepts, philosophies, and services explained. Discussed different levels of hospice care with family providing around the clock care in home vs facility with cost of room and board of pocket. Wife and daughter stated inability for patient care to be done in home as wife can not provide herself and family unable to be present all the time. Family aware of cost of [facility] [place] as SW had obtained for them already. Voiced wish to go to facility with hospice care. Choice of agency given. Wish for [facility] as they know several people that live in [place] that work for agency. Call placed to [address] with update. Updated primary nurses as well.
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 Brian Mcbride from oncology, who recommended conservative Rx , hospice as he is not a candidate for aggressive Rx with his co morbidities, as he has stage 4 cancer, as outlined above. Came back and met family this evening , and they prefer to go with hospice. So hospice team consulted,   Likely not an inpatient candidate at this time. Spoke with him re various   Options, home vs ECF, and SS back in am to assist with this. Code status was also d/w pt and family - they prefer a DNR/CC so new code status addressed, and personally spoke with his Nurse. Chief Complaint: abdominal discomfort    Initial H and P:-    Eino Hamman is a 79 y.o. male with PMHx of Afib, CAD, CHF, HTN, AS, adenocarcinoma who presented to [address] [place] [address] with chief complaint of fatigue, altered mental status. Patient is alert, oriented to self, place, somewhat to situation. He reports feeling fatigued. He reports some abdominal discomfort yesterday, since resolved. He reports diarrhea, this is chronic. He states he has had a mild productive cough. Patient has a rash, which the family states appeared after presentation to the ED. Patient reports mild itching. Family present at bedside.   Patient's wife states that over the last 2 days, he has been more fatigued than usual.  Family states that he has
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 Sloan Null from oncology, who recommended conservative Rx , hospice as he is not a candidate for aggressive Rx with his co morbidities, as he has stage 4 cancer, as outlined above. Came back and met family this evening , and they prefer to go with hospice. So hospice team consulted,   Likely not an inpatient candidate at this time, and they consented to [facility]  Tomorrow, with hospice . Code status was also d/w pt and family - they prefer a DNR/CC so new code status addressed, and personally spoke with his Nurse. Goal at this time is keep him comfortable, and stopped all labs, and imaging studies. I spoke with neuro team today. Chief Complaint: abdominal discomfort    Initial H and P:-    Maile Morales is a 79 y.o. male with PMHx of Afib, CAD, CHF, HTN, AS, adenocarcinoma who presented to [facility] with chief complaint of fatigue, altered mental status. Patient is alert, oriented to self, place, somewhat to situation. He reports feeling fatigued. He reports some abdominal discomfort yesterday, since resolved. He reports diarrhea, this is chronic. He states he has had a mild productive cough. Patient has a rash, which the family states appeared after presentation to the ED. Patient reports mild itching. Family present at bedside.   Patient's wife states that over
Met with patient, his wife and 2 daughters. Discussed hospice concepts and philosophies. Reviewed with them that hospice will be able to admit  Kasie Caldwell to hospice at his home or facility when he is discharged. Explained to them that I have not had a chance to look at his chart before speaking with them. Gave hospice referral packet to wife,. Encouraged them to call hospice with any concern or questions tonight. Stated that the hospice liaison will follow up with them on Thursday.
Nutrition Note:    After discussion with Dr. Arie Becker, patient decided on comfort care only, declined feeding tube. Regular diet resumed, Ensure Enlive TID. Patient seen this morning, RN present, eating only small amounts of applesauce, ice cream, ice chips. Due to change in medical/code status, dietitian will sign off. Note hospice has been consulted, planning discharge to [facility] with hospice. If nutrition intervention is required, please submit a dietitian consult.    Electronically signed by Tien Lamb RD, LD on 4/14/2023 at 8:33 AM   [phone]
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 If possible,   please document in progress notes and discharge summary the relationship, if   any, between encephalopathy and dehydration, hypercalcemia & Metastatic   adenocarcinoma of the liver / cholangiocarcinoma. 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 Clinical Indicators: [Metabolic Encephalopathy, dehydration, & hypercalcemia  Treatment: initiate hospice & palliative care    Jame MALDONADO, RN  Options provided:  -- Metabolic Encephalopathy due to Metastatic adenocarcinoma of the liver /   cholangiocarcinoma, dehydration, & hypercalcemia. -- Metabolic Encephalopathy due to dehydration. -- Metabolic Encephalopathy due to hypercalcemia. -- Metabolic Encephalopathy due to Metastatic adenocarcinoma of the liver /   cholangiocarcinoma. -- Other - I will add my own diagnosis  -- Disagree - Not applicable / Not valid  -- Disagree - Clinically unable to determine / Unknown  -- Refer to Clinical Documentation Reviewer    PROVIDER RESPONSE TEXT:    This patient has Metabolic Encephalopathy due to Metastatic adenocarcinoma of   the liver / cholangiocarcinoma, dehydration, & hypercalcemia.     Query created by: Zulay Hook on 2023 9:44 AM      Electronically signed by:  Nithya Boone MD 2023 11:05 AM
Pt admitted to  4A5 via cart/stretcher. Complaints: AMS. IV site free of s/s of infection or infiltration. Vital signs obtained. Assessment and data collection initiated. Two nurse skin assessment performed by   Richy Macias. Oriented to room. Policies and procedures for 4A explained. All questions answered with no further questions at this time. Fall prevention and safety brochure discussed with patient. Bed alarm on. Call light in reach. Patient noted to have periods of staring off into space, not breathing and not to respond to voice, will respond to tactile stimuli. This nurse speaks with Kedar Suarez with neuro who states patient had been doing that with him as well. Patient's blood pressure noted to be 88 systolic for manualArielle notified, orders given.      ME@ 4/11/2023 7:45 PM
Virtual nurse completed safety rounds, patient resting quietly in bed with family at bedside. Patient does voice concerns over billing and medicare coverage for this hospitalization, advised patient that Would relay his concerns to case management/social work.  Denies other needs at this time
[facility]  SPEECH THERAPY  [facility] NEUROSCIENCES 4A  Clinical Swallow Evaluation + Dysphagia Treatment      SLP Individual Minutes  Time In: 1200  Time Out: 8379  Minutes: 20  Timed Code Treatment Minutes: 0 Minutes  Clinical Swallow Evaluation: 10 minutes  Dysphagia Treatment: 10 minutes     Date: 2023  Patient Name: Rosaline Coello      CSN: [id number]   : 1940  (79 y.o.)  Gender: male   Referring Physician: Suzanne Kohli MD    Diagnosis: AMS (altered mental status)    History of Present Illness/Injury: Patient admitted 23 for altered mental status; arrived by EMS from nursing home. Family reported patient has also had poor appetite and lost weight. Patient with code status change this hospitalization to LIMITED x4 with potential plans for hospice; family awaiting further information from patients oncologist, Dr. Aleshia Harden. CT head ordered; no final results yet. CXR with no acute findings; negative for infiltrates. Awaiting biopsy results for potential chemotherapy plan as well. ST consulted to assess swallow function at bedside in order to determine safest diet level and establishment of goals/POC. Past Medical History:   Diagnosis Date    Arthritis     Atrial fibrillation [facility])     Atrial fibrillation ([facility] 2020    CAD (coronary artery disease)     CHF (congestive heart failure) ([facility]     Dyslipidemia 2021    Enlarged prostate 2021    GERD (gastroesophageal reflux disease)     Hyperlipidemia     Hypertension     Medtronic dual pacemaker  2022       SUBJECTIVE:  [address] [place] [address] with approval to complete clinical swallow evaluation, reports patient from [facility] with recent MBS completed yesterday, 23; provided paper report to this ST for review. Upon arrival, patient seated upright in bed; alert throughout. Patients daughters and wife also present. OBJECTIVE:    Pain:  No pain reported.     Current Diet: NPO    Respiratory Status:  Room
without flow-limiting stenosis or aneurysm. The bilateral middle cerebral and anterior cerebral arteries are patent without focal abnormality identified. The basilar artery is patent without focal stenosis or visualized aneurysm. The bilateral posterior cerebral and superior cerebellar arteries are patent without focal abnormality identified. Dural venous sinuses appear patent without focal filling defect. No focal areas of abnormal parenchymal enhancement are identified. CTA NECK: There is a typical 3 vessel arch. There is mild mural calcification in the brachycephalic and left subclavian arteries without flow-limiting stenosis. Common carotid arteries are patent without flow-limiting stenosis. There is calcified and noncalcified mural plaque at the carotid bulb/proximal internal carotid arteries. The narrowest luminal diameter at the right carotid bulb is 1.1 mm with a point more distal, where the walls are parallel, measuring 3.1 mm. On the left side, the narrowest luminal diameter is 2.5 mm with a point more distal, where the walls are parallel, measuring 3.6 mm. Cervical segments of internal carotid arteries are otherwise patent without focal stenosis. The left vertebral artery is dominant. There is moderate stenosis of the takeoffs of the bilateral vertebral arteries. Vertebral arteries are otherwise patent without focal stenosis. There is streak artifact from dental amalgam which partially obscures oral and perioral soft tissues. Given this caveat, mucosal surfaces of the aerodigestive tract are symmetric without focal nodular thickening or visualized mass. There is some hyperdense material along the mucosal surfaces of the vallecula, glottis, supraglottic larynx and subglottic larynx on the right possibly on the basis of prior aspiration. No cervical lymphadenopathy is identified. The parotid and submandibular glands are atrophied. The thyroid gland is unremarkable.  Visualized portions of the lungs are

## 2023-04-23 LAB
EKG ATRIAL RATE: 68 BPM
EKG Q-T INTERVAL: 536 MS
EKG QRS DURATION: 184 MS
EKG QTC CALCULATION (BAZETT): 536 MS
EKG R AXIS: -71 DEGREES
EKG T AXIS: 102 DEGREES
EKG VENTRICULAR RATE: 60 BPM